# Patient Record
Sex: MALE | Race: WHITE | NOT HISPANIC OR LATINO | Employment: OTHER | URBAN - NONMETROPOLITAN AREA
[De-identification: names, ages, dates, MRNs, and addresses within clinical notes are randomized per-mention and may not be internally consistent; named-entity substitution may affect disease eponyms.]

---

## 2017-01-02 ENCOUNTER — HOSPITAL ENCOUNTER (OUTPATIENT)
Dept: OTHER | Facility: HOSPITAL | Age: 77
Discharge: HOME | End: 2017-01-02
Attending: INTERNAL MEDICINE | Admitting: INTERNAL MEDICINE

## 2017-01-03 ENCOUNTER — CONSULT (OUTPATIENT)
Dept: SURGERY | Facility: CLINIC | Age: 77
End: 2017-01-03

## 2017-01-03 VITALS
WEIGHT: 198 LBS | DIASTOLIC BLOOD PRESSURE: 74 MMHG | HEIGHT: 66 IN | BODY MASS INDEX: 31.82 KG/M2 | SYSTOLIC BLOOD PRESSURE: 134 MMHG

## 2017-01-03 DIAGNOSIS — C83.35 DIFFUSE LARGE B-CELL LYMPHOMA OF LYMPH NODES OF INGUINAL REGION (HCC): Primary | ICD-10-CM

## 2017-01-03 PROCEDURE — 99204 OFFICE O/P NEW MOD 45 MIN: CPT | Performed by: SURGERY

## 2017-01-03 RX ORDER — LISINOPRIL 10 MG/1
20 TABLET ORAL DAILY
Refills: 2 | COMMUNITY
Start: 2016-12-13 | End: 2019-07-09 | Stop reason: ALTCHOICE

## 2017-01-03 RX ORDER — ASPIRIN 325 MG
325 TABLET ORAL AS NEEDED
COMMUNITY
End: 2017-02-20 | Stop reason: HOSPADM

## 2017-01-03 RX ORDER — ATENOLOL 50 MG/1
50 TABLET ORAL DAILY
Refills: 3 | COMMUNITY
Start: 2016-12-13

## 2017-01-03 RX ORDER — HYDROCODONE BITARTRATE AND ACETAMINOPHEN 7.5; 325 MG/1; MG/1
1 TABLET ORAL EVERY 6 HOURS PRN
COMMUNITY
Start: 2016-12-07 | End: 2017-12-08

## 2017-01-03 NOTE — PROGRESS NOTES
Subjective   Sharan Steiner is a 76 y.o. male.   Referral from Dr. Ahumada.  Chief complaint port placement.  History of Present Illness   Mr. 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  He has had his orchiectomy and had a workup and he was deemed appropriate for chemotherapy.  He had a previous cancer on his left eyelid.  He is diagnosed with lymphoma month and half ago.  He currently has no symptoms.    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  He also has high blood pressure.    Surgical history of back surgery, sinus polyp surgery, left eyelid surgery, left orchiectomy.      Current Outpatient Prescriptions:   •  aspirin 325 MG tablet, Take 325 mg by mouth Daily As Needed for mild pain (1-3)., Disp: , Rfl:   •  aspirin 81 MG chewable tablet, Chew 81 mg daily., Disp: , Rfl:   •  atenolol (TENORMIN) 50 MG tablet, Take 1 tablet by mouth Daily., Disp: , Rfl: 3  •  doxazosin (CARDURA) 4 MG tablet, Take 4 mg by mouth every night., Disp: , Rfl:   •  fluticasone (FLONASE) 50 MCG/ACT nasal spray, 2 sprays into each nostril daily. Administer 2 sprays in each nostril for each dose., Disp: , Rfl:   •  HYDROcodone-acetaminophen (NORCO) 7.5-325 MG per tablet, Take 1 tablet by mouth Every 6 (Six) Hours As Needed., Disp: , Rfl:   •  lisinopril (PRINIVIL,ZESTRIL) 10 MG tablet, Take 1 tablet by mouth Daily., Disp: , Rfl: 2    He is allergic to tramadol, Levaquin, sulfa, penicillin, Crestor.    Family history of prostate cancer and lung cancer as well as diabetes and high blood pressure.    Social history patient is retired  and is .  He has one daughter.  The he used to smoke but quit tobacco 10 years ago.  The following portions of the patient's history were reviewed and updated as appropriate: allergies, current medications, past family  history, past medical history, past social history, past surgical history and problem list.    Review of Systems   Constitutional: Negative.    HENT: Negative.    Eyes: Negative.    Respiratory: Negative.    Cardiovascular: Negative.    Gastrointestinal: Negative.    Endocrine: Negative.    Genitourinary: Negative.    Musculoskeletal: Positive for arthralgias.   Skin: Negative.    Allergic/Immunologic: Negative.    Neurological: Negative.    Hematological: Negative.    Psychiatric/Behavioral: Negative.        Objective   Physical Exam   Constitutional: He is oriented to person, place, and time. He appears well-developed.   HENT:   Head: Normocephalic and atraumatic.   Eyes: Pupils are equal, round, and reactive to light.   Neck: Normal range of motion. Neck supple.   Cardiovascular: Normal rate, regular rhythm, normal heart sounds and intact distal pulses.    Pulmonary/Chest: Effort normal and breath sounds normal.   Abdominal: Soft. Bowel sounds are normal.   Musculoskeletal: Normal range of motion.   Neurological: He is alert and oriented to person, place, and time.   Skin: Skin is warm and dry.   Psychiatric: He has a normal mood and affect. His behavior is normal.       Assessment/Plan   Sharan was seen today for pre-op exam.    Diagnoses and all orders for this visit:    Diffuse large B-cell lymphoma of lymph nodes of inguinal region     Mr. Steiner is a 76-year-old gentleman with newly diagnosed lymphoma.  He is here for discussion for port placement.  The procedure and risks benefits alternatives and him and his family understand and agree with plan.  They would like to have his port placed this Thursday, January 5.  Thank you for the consult.  Due to his severe vertigo will not be allowed to hold his aspirin preoperatively.

## 2017-01-03 NOTE — LETTER
January 3, 2017     Conrado Ahumada MD  [address] [place] [address]   [place] [address]    Patient: Sharan Steiner   YOB: 1940   Date of Visit: 1/3/2017       Dear Dr. Zita MD:    Thank you for referring Sharan Steiner to me for evaluation. Below are the relevant portions of my assessment and plan of care.      Sharan was seen today for pre-op exam.    Diagnoses and all orders for this visit:    Diffuse large B-cell lymphoma of lymph nodes of inguinal region     Mr. Steiner is a 76-year-old gentleman with newly diagnosed lymphoma.  He is here for discussion for port placement.  The procedure and risks benefits alternatives and him and his family understand and agree with plan.  They would like to have his port placed this Thursday, January 5.  Thank you for the consult.  Due to his severe vertigo will not be allowed to hold his aspirin preoperatively.                      If you have questions, please do not hesitate to call me. I look forward to following Sharan along with you.         Sincerely,        Wilfrido Ramsey MD        CC: No Recipients

## 2017-01-05 ENCOUNTER — HOSPITAL ENCOUNTER (OUTPATIENT)
Dept: OTHER | Facility: HOSPITAL | Age: 77
Setting detail: HOSPITAL OUTPATIENT SURGERY
Discharge: PSYCHIATRIC OR SUBSTANCE USE FACILITY | End: 2017-01-05
Attending: SURGERY | Admitting: SURGERY

## 2017-01-06 LAB
ALBUMIN SERPL-MCNC: 3.7 GM/DL (ref 3.4–4.8)
ALP SERPL-CCNC: 84 U/L (ref 38–126)
ALT SERPL-CCNC: 33 U/L (ref 21–72)
ANION GAP SERPL CALCULATED.3IONS-SCNC: 7 MMOL/L (ref 5–15)
APTT PPP: 29.2 SECONDS (ref 20–40.3)
AST SERPL-CCNC: 26 U/L (ref 17–59)
BASOPHILS # BLD AUTO: 0.05 X1000/UL (ref 0–0.2)
BASOPHILS NFR BLD AUTO: 0.6 % (ref 0–2)
BILIRUB SERPL-MCNC: 0.5 MG/DL (ref 0.2–1.3)
BUN SERPL-MCNC: 13 MG/DL (ref 7–21)
CALCIUM SERPL-MCNC: 9 MG/DL (ref 8.4–10.2)
CHLORIDE SERPL-SCNC: 102 MMOL/L (ref 95–110)
CO2 SERPL-SCNC: 30 MMOL/L (ref 22–31)
CONV AUTO IG#: 0.03 X1000/UL (ref 0.01–0.02)
CONV AUTO IG%: 0.3 % (ref 0–0.5)
CREAT SERPL-MCNC: 1 MG/DL (ref 0.7–1.3)
EOSINOPHIL # BLD AUTO: 0.42 X1000/UL (ref 0–0.7)
EOSINOPHIL NFR BLD AUTO: 4.7 % (ref 0–7)
ERYTHROCYTE [DISTWIDTH] IN BLOOD: 13.1 % (ref 11.5–14.5)
GLUCOSE SERPL-MCNC: 90 MG/DL (ref 60–100)
GRANULOCYTES # BLD AUTO: 6.5 X1000/UL (ref 2–8.6)
GRANULOCYTES NFR BLD AUTO: 72.4 % (ref 37–80)
HCT VFR BLD CALC: 38.5 % (ref 39–49)
HGB BLD-MCNC: 13.1 GM/DL (ref 13.7–17.3)
INR PPP: 1 (ref 0.8–1.2)
LYMPHOCYTES # BLD AUTO: 1.39 X1000/UL (ref 0.6–4.2)
LYMPHOCYTES NFR BLD AUTO: 15.5 % (ref 10–50)
MCH RBC QN: 28.4 PG (ref 26–34)
MCHC RBC-ENTMCNC: 34 GM/DL (ref 31.5–36.3)
MCV RBC: 83.5 FL (ref 80–98)
MONOCYTES # BLD AUTO: 0.58 X1000/UL (ref 0–0.9)
MONOCYTES NFR BLD AUTO: 6.5 % (ref 0–12)
NRBC BLD AUTO-RTO: 0 % (ref 0–0.2)
NRBC SPEC MANUAL: 0 X1000/UL
PLATELET # BLD: 219 X1000/MM3 (ref 150–450)
PMV BLD: 10.5 FL (ref 8–12)
POTASSIUM SERPL-SCNC: 4.5 MMOL/L (ref 3.5–5.1)
PROT SERPL-MCNC: 6.7 GM/DL (ref 6.3–8.6)
PROTHROMBIN TIME: 13.2 SECONDS (ref 11.1–15.3)
RBC # BLD: 4.61 MEGA/MM3 (ref 4.37–5.74)
SODIUM SERPL-SCNC: 139 MMOL/L (ref 137–145)
WBC # BLD: 9 X1000/UL (ref 3.2–9.8)

## 2017-01-10 ENCOUNTER — OFFICE VISIT (OUTPATIENT)
Dept: GASTROENTEROLOGY | Facility: CLINIC | Age: 77
End: 2017-01-10

## 2017-01-10 VITALS
DIASTOLIC BLOOD PRESSURE: 98 MMHG | RESPIRATION RATE: 18 BRPM | SYSTOLIC BLOOD PRESSURE: 166 MMHG | OXYGEN SATURATION: 95 % | WEIGHT: 197 LBS | HEART RATE: 54 BPM | HEIGHT: 66 IN | BODY MASS INDEX: 31.66 KG/M2

## 2017-01-10 DIAGNOSIS — R93.89 ABNORMAL FINDING ON RADIOLOGY EXAM: Primary | ICD-10-CM

## 2017-01-10 PROCEDURE — 99213 OFFICE O/P EST LOW 20 MIN: CPT | Performed by: INTERNAL MEDICINE

## 2017-01-10 RX ORDER — SODIUM, POTASSIUM,MAG SULFATES 17.5-3.13G
1 SOLUTION, RECONSTITUTED, ORAL ORAL EVERY 12 HOURS
Qty: 1 BOTTLE | Refills: 0 | Status: SHIPPED | OUTPATIENT
Start: 2017-01-10 | End: 2017-02-16

## 2017-01-10 NOTE — LETTER
January 10, 2017     Mary Ellen Zhou DO  [address] [facility] [address]    Patient: Sharan Steiner   YOB: 1940   Date of Visit: 1/10/2017       Dear Dr. Abelardo DO:    Thank you for referring Sharan Steiner to me for evaluation. Below are the relevant portions of my assessment and plan of care.      Sharan was seen today for colonoscopy.    Diagnoses and all orders for this visit:    Abnormal finding on radiology exam  -     sodium-potassium-magnesium sulfates (SUPREP) solution oral solution; Take 1 bottle by mouth Every 12 (Twelve) Hours.  -     Case request        COLONOSCOPY (N/A)     Diagnosis Plan   1. Abnormal finding on radiology exam  sodium-potassium-magnesium sulfates (SUPREP) solution oral solution    Case request       Anticipated Surgical Procedure:  No orders of the defined types were placed in this encounter.      The risks, benefits, and alternatives of this procedure have been discussed with the patient or the responsible party- the patient understands and agrees to proceed.                                                                              If you have questions, please do not hesitate to call me. I look forward to following Sharan along with you.         Sincerely,        Jeff Crabtree MD        CC: No Recipients

## 2017-01-10 NOTE — LETTER
January 10, 2017     Mary Ellen Zhou DO  [address] [facility] [address]    Patient: Sharan Steiner   YOB: 1940   Date of Visit: 1/10/2017       Dear Dr. Abelardo DO:    Thank you for referring Sharan Steiner to me for evaluation. Below is a copy of my consult note.    If you have questions, please do not hesitate to call me. I look forward to following Sharan along with you.         Sincerely,        Jeff Crabtree MD        CC: No Recipients    [facility]      Chief Complaint:   Chief Complaint   Patient presents with   • Colonoscopy     Consult per Zita       Subjective     HPI:   Patient with a recent diagnosis of lymphoma.  Patient was found to have an abnormal PET scan with questionable area of mass at the ileocecal valve.  Patient denies any difficulty with bowel movements or abdominal pain.  Patient had a normal colonoscopy in 2013 with normal-appearing ileocecal valve.    Plan; we'll schedule patient for colonoscopy to evaluate the area around the ileocecal valve.    Past Medical History:   Past Medical History   Diagnosis Date   • Benign neoplasm of skin of eyelid      probable pyogenic granuloma YAA   • Conjunctival cyst      CARUNCLE OS   • Cortical senile cataract    • Diabetes mellitus    • Hypertension    • Malignant neoplasm of upper eyelid      sebaceous gland CA, YAA margin, completely excised, no recurrence   • Nuclear cataract        Family History:  Family History   Problem Relation Age of Onset   • Diabetes Mother    • Hypertension Sister    • Cancer Sister    • Hypertension Brother    • Cancer Brother        Social History:   reports that he has quit smoking. He does not have any smokeless tobacco history on file. He reports that he does not drink alcohol.    Medications:     (Not in a hospital admission)    Allergies:  Crestor [rosuvastatin calcium]; Levofloxacin; Penicillins; Sulfa antibiotics; and Tramadol    ROS:    Review of Systems   HENT:  "Negative for congestion, sore throat and trouble swallowing.    Respiratory: Negative for apnea, cough, choking, chest tightness, shortness of breath, wheezing and stridor.    Cardiovascular: Negative for chest pain, palpitations and leg swelling.   Gastrointestinal: Negative for abdominal distention, abdominal pain, anal bleeding, blood in stool, constipation, diarrhea, nausea, rectal pain and vomiting.   Skin: Negative for color change, pallor, rash and wound.   Neurological: Negative for dizziness, syncope, weakness and headaches.   Psychiatric/Behavioral: Negative for agitation, behavioral problems, confusion and decreased concentration.     Objective     Blood pressure 166/98, pulse 54, resp. rate 18, height 66\" (167.6 cm), weight 197 lb (89.4 kg), SpO2 95 %.    Physical Exam   Constitutional: He is oriented to person, place, and time. He appears well-developed and well-nourished. No distress.   HENT:   Head: Normocephalic and atraumatic.   Cardiovascular: Normal rate, regular rhythm, normal heart sounds and intact distal pulses.  Exam reveals no gallop and no friction rub.    No murmur heard.  Pulmonary/Chest: Breath sounds normal. No respiratory distress. He has no wheezes. He has no rales. He exhibits no tenderness.   Abdominal: Soft. Bowel sounds are normal. He exhibits no distension and no mass. There is no tenderness. There is no rebound and no guarding. No hernia.   Musculoskeletal: Normal range of motion. He exhibits no edema.   Neurological: He is alert and oriented to person, place, and time.   Skin: Skin is warm and dry. No rash noted. He is not diaphoretic. No erythema. No pallor.   Psychiatric: He has a normal mood and affect. His behavior is normal. Judgment and thought content normal.        Assessment/Plan   Sharan was seen today for colonoscopy.    Diagnoses and all orders for this visit:    Abnormal finding on radiology exam  -     sodium-potassium-magnesium sulfates (SUPREP) solution oral " solution; Take 1 bottle by mouth Every 12 (Twelve) Hours.  -     Case request        COLONOSCOPY (N/A)     Diagnosis Plan   1. Abnormal finding on radiology exam  sodium-potassium-magnesium sulfates (SUPREP) solution oral solution    Case request       Anticipated Surgical Procedure:  No orders of the defined types were placed in this encounter.      The risks, benefits, and alternatives of this procedure have been discussed with the patient or the responsible party- the patient understands and agrees to proceed.

## 2017-01-10 NOTE — PROGRESS NOTES
[facility]      Chief Complaint:   Chief Complaint   Patient presents with   • Colonoscopy     Consult per Zita       Subjective     HPI:   Patient with a recent diagnosis of lymphoma.  Patient was found to have an abnormal PET scan with questionable area of mass at the ileocecal valve.  Patient denies any difficulty with bowel movements or abdominal pain.  Patient had a normal colonoscopy in 2013 with normal-appearing ileocecal valve.    Plan; we'll schedule patient for colonoscopy to evaluate the area around the ileocecal valve.    Past Medical History:   Past Medical History   Diagnosis Date   • Benign neoplasm of skin of eyelid      probable pyogenic granuloma YAA   • Conjunctival cyst      CARUNCLE OS   • Cortical senile cataract    • Diabetes mellitus    • Hypertension    • Malignant neoplasm of upper eyelid      sebaceous gland CA, YAA margin, completely excised, no recurrence   • Nuclear cataract        Family History:  Family History   Problem Relation Age of Onset   • Diabetes Mother    • Hypertension Sister    • Cancer Sister    • Hypertension Brother    • Cancer Brother        Social History:   reports that he has quit smoking. He does not have any smokeless tobacco history on file. He reports that he does not drink alcohol.    Medications:     (Not in a hospital admission)    Allergies:  Crestor [rosuvastatin calcium]; Levofloxacin; Penicillins; Sulfa antibiotics; and Tramadol    ROS:    Review of Systems   HENT: Negative for congestion, sore throat and trouble swallowing.    Respiratory: Negative for apnea, cough, choking, chest tightness, shortness of breath, wheezing and stridor.    Cardiovascular: Negative for chest pain, palpitations and leg swelling.   Gastrointestinal: Negative for abdominal distention, abdominal pain, anal bleeding, blood in stool, constipation, diarrhea, nausea, rectal pain and vomiting.   Skin: Negative for color change, pallor, rash and wound.  "  Neurological: Negative for dizziness, syncope, weakness and headaches.   Psychiatric/Behavioral: Negative for agitation, behavioral problems, confusion and decreased concentration.     Objective     Blood pressure 166/98, pulse 54, resp. rate 18, height 66\" (167.6 cm), weight 197 lb (89.4 kg), SpO2 95 %.    Physical Exam   Constitutional: He is oriented to person, place, and time. He appears well-developed and well-nourished. No distress.   HENT:   Head: Normocephalic and atraumatic.   Cardiovascular: Normal rate, regular rhythm, normal heart sounds and intact distal pulses.  Exam reveals no gallop and no friction rub.    No murmur heard.  Pulmonary/Chest: Breath sounds normal. No respiratory distress. He has no wheezes. He has no rales. He exhibits no tenderness.   Abdominal: Soft. Bowel sounds are normal. He exhibits no distension and no mass. There is no tenderness. There is no rebound and no guarding. No hernia.   Musculoskeletal: Normal range of motion. He exhibits no edema.   Neurological: He is alert and oriented to person, place, and time.   Skin: Skin is warm and dry. No rash noted. He is not diaphoretic. No erythema. No pallor.   Psychiatric: He has a normal mood and affect. His behavior is normal. Judgment and thought content normal.        Assessment/Plan   Sharan was seen today for colonoscopy.    Diagnoses and all orders for this visit:    Abnormal finding on radiology exam  -     sodium-potassium-magnesium sulfates (SUPREP) solution oral solution; Take 1 bottle by mouth Every 12 (Twelve) Hours.  -     Case request        COLONOSCOPY (N/A)     Diagnosis Plan   1. Abnormal finding on radiology exam  sodium-potassium-magnesium sulfates (SUPREP) solution oral solution    Case request       Anticipated Surgical Procedure:  No orders of the defined types were placed in this encounter.      The risks, benefits, and alternatives of this procedure have been discussed with the patient or the responsible " party- the patient understands and agrees to proceed.

## 2017-01-13 ENCOUNTER — HOSPITAL ENCOUNTER (OUTPATIENT)
Dept: GASTROENTEROLOGY | Facility: HOSPITAL | Age: 77
Setting detail: HOSPITAL OUTPATIENT SURGERY
Discharge: HOME | End: 2017-01-13
Attending: INTERNAL MEDICINE | Admitting: INTERNAL MEDICINE

## 2017-01-17 ENCOUNTER — HOSPITAL ENCOUNTER (OUTPATIENT)
Dept: OTHER | Facility: HOSPITAL | Age: 77
Setting detail: RADIATION/ONCOLOGY SERIES
Discharge: HOME | End: 2017-01-20
Attending: INTERNAL MEDICINE | Admitting: INTERNAL MEDICINE

## 2017-01-17 LAB
ALBUMIN SERPL-MCNC: 3.7 GM/DL (ref 3.4–4.8)
ALP SERPL-CCNC: 88 U/L (ref 38–126)
ALT SERPL-CCNC: 31 U/L (ref 21–72)
ANION GAP SERPL CALCULATED.3IONS-SCNC: 10 MMOL/L (ref 5–15)
AST SERPL-CCNC: 30 U/L (ref 17–59)
BASOPHILS # BLD AUTO: 0.08 X1000/UL (ref 0–0.2)
BASOPHILS NFR BLD AUTO: 1.1 % (ref 0–2)
BILIRUB SERPL-MCNC: 0.5 MG/DL (ref 0.2–1.3)
BUN SERPL-MCNC: 13 MG/DL (ref 7–21)
CALCIUM SERPL-MCNC: 8.8 MG/DL (ref 8.4–10.2)
CHLORIDE SERPL-SCNC: 102 MMOL/L (ref 95–110)
CO2 SERPL-SCNC: 27 MMOL/L (ref 22–31)
CONV AUTO IG#: 0.02 X1000/UL (ref 0.01–0.02)
CONV AUTO IG%: 0.3 % (ref 0–0.5)
CONVERTED (HISTORICAL) SPECIMEN DESCRIPTION 1: NORMAL
CONVERTED (HISTORICAL) SPECIMEN DESCRIPTION 2: NORMAL
CREAT SERPL-MCNC: 0.9 MG/DL (ref 0.7–1.3)
EOSINOPHIL # BLD AUTO: 0.33 X1000/UL (ref 0–0.7)
EOSINOPHIL NFR BLD AUTO: 4.7 % (ref 0–7)
ERYTHROCYTE [DISTWIDTH] IN BLOOD: 13.4 % (ref 11.5–14.5)
GLUCOSE SERPL-MCNC: 92 MG/DL (ref 60–100)
GRANULOCYTES # BLD AUTO: 4.75 X1000/UL (ref 2–8.6)
GRANULOCYTES NFR BLD AUTO: 67.9 % (ref 37–80)
HCT VFR BLD CALC: 38.2 % (ref 39–49)
HGB BLD-MCNC: 12.9 GM/DL (ref 13.7–17.3)
LYMPHOCYTES # BLD AUTO: 1.28 X1000/UL (ref 0.6–4.2)
LYMPHOCYTES NFR BLD AUTO: 18.3 % (ref 10–50)
MCH RBC QN: 27.9 PG (ref 26–34)
MCHC RBC-ENTMCNC: 33.8 GM/DL (ref 31.5–36.3)
MCV RBC: 82.7 FL (ref 80–98)
MONOCYTES # BLD AUTO: 0.54 X1000/UL (ref 0–0.9)
MONOCYTES NFR BLD AUTO: 7.7 % (ref 0–12)
NRBC BLD AUTO-RTO: 0 % (ref 0–0.2)
NRBC SPEC MANUAL: 0 X1000/UL
PLATELET # BLD: 265 X1000/MM3 (ref 150–450)
PMV BLD: 10.3 FL (ref 8–12)
POTASSIUM SERPL-SCNC: 4.4 MMOL/L (ref 3.5–5.1)
PROT SERPL-MCNC: 6.7 GM/DL (ref 6.3–8.6)
RBC # BLD: 4.62 MEGA/MM3 (ref 4.37–5.74)
SODIUM SERPL-SCNC: 139 MMOL/L (ref 137–145)
WBC # BLD: 7 X1000/UL (ref 3.2–9.8)

## 2017-01-17 NOTE — OP NOTE
[facility]                               REPORT OF OPERATION     NAME:  LEE STANFORD   UNIT #:  [id number]  :  1940              ACCT #:  [id number]  ROOM:  [id number]                 SURGEON:  RANDAL VARGAS MD  DICTATED:  2017   TRANSCRIBED:  2017 09        DATE OF PROCEDURE:  2017     PREOPERATIVE DIAGNOSIS:  Lymphoma.     POSTOPERATIVE DIAGNOSIS:  Lymphoma.     PROCEDURE PERFORMED:  Right internal jugular MediPort placement with  ultrasound guidance.     ASSISTANT:  Taylor Chavez CSA     COMPLICATIONS:  None.     SPECIMENS:  None.     IMPLANT:  An 8-Uzbek MediPort.     FINDINGS:  A right IJ vein access with 1 stick. Wires seen passing via  ultrasound and fluoroscopic guidance, and then fluoroscopy confirmed  catheter tip placement.     ANESTHESIA:  MAC and local.     DESCRIPTION OF PROCEDURE:  After consent was obtained patient was taken  to the operating room, where MAC anesthesia was induced. Perioperative  antibiotics were given. The neck and bilateral chest were prepped and  draped in normal sterile fashion. SCDs were in placed and perioperative  antibiotics had been given. We prepped and draped and time-out was  performed.     Then under ultrasound guidance, the right internal jugular vein was  visualized and accessed via a needle, and using standard Seldinger  technique, a wire was passed, and then a dilator was passed and  fluoroscopy was used to confirm that the wire was in place as well as  ultrasound. Once we had our sheath in, we then made our pocket in the  right upper chest just under the clavicle and tunneled the catheter from  the pocket to the stick site. The catheter was threaded through the  sheath and the sheath was broke and removed. The catheter flushed well.  The catheter was then pulled to the correct length under fluoroscopic  guidance just above the heart in the superior vena cava. At this time  the catheter was cut to  length and the caps placed on and the port was  attached to the catheter with the coupling device. The port was then  sewn to the chest wall and once again the catheter was checked. It  flushed and billy well. It was flushed with heparinized saline with a  Dueñas needle. Final chest x-ray was done which showed good catheter  position and no kinks in the catheter.     At this time the skin on the port site was closed with interrupted deep  dermal 3-0 Vicryl's, and both the stick and the port site were closed  with 4-0 Vicryl's and covered with Skin Affix. The procedure was  terminated. Patient tolerated the procedure well.        RANDAL VARGAS MD  Electronically Signed  01/17/2017 11:40:33  By MD ALYSIA PAULINO/matthew  [id number]                            REPORT OF OPERATION  Page #page

## 2017-01-19 DIAGNOSIS — C83.30 DLBCL (DIFFUSE LARGE B CELL LYMPHOMA) (HCC): ICD-10-CM

## 2017-02-02 ENCOUNTER — APPOINTMENT (OUTPATIENT)
Dept: CT IMAGING | Facility: HOSPITAL | Age: 77
End: 2017-02-02

## 2017-02-02 ENCOUNTER — HOSPITAL ENCOUNTER (EMERGENCY)
Facility: HOSPITAL | Age: 77
Discharge: HOME | End: 2017-02-02
Attending: EMERGENCY MEDICINE | Admitting: EMERGENCY MEDICINE

## 2017-02-02 VITALS
WEIGHT: 196 LBS | OXYGEN SATURATION: 91 % | TEMPERATURE: 97.1 F | DIASTOLIC BLOOD PRESSURE: 81 MMHG | HEIGHT: 66 IN | BODY MASS INDEX: 31.5 KG/M2 | SYSTOLIC BLOOD PRESSURE: 186 MMHG | RESPIRATION RATE: 18 BRPM | HEART RATE: 60 BPM

## 2017-02-02 DIAGNOSIS — R56.9 SEIZURE (HCC): Primary | ICD-10-CM

## 2017-02-02 LAB
ALBUMIN SERPL-MCNC: 3.7 G/DL (ref 3.4–4.8)
ALBUMIN/GLOB SERPL: 1.3 G/DL (ref 1.1–1.8)
ALP SERPL-CCNC: 109 U/L (ref 38–126)
ALT SERPL W P-5'-P-CCNC: 25 U/L (ref 21–72)
ANION GAP SERPL CALCULATED.3IONS-SCNC: 10 MMOL/L (ref 5–15)
AST SERPL-CCNC: 30 U/L (ref 17–59)
BASOPHILS # BLD AUTO: 0.03 10*3/MM3 (ref 0–0.2)
BASOPHILS NFR BLD AUTO: 0.3 % (ref 0–2)
BILIRUB SERPL-MCNC: 0.3 MG/DL (ref 0.2–1.3)
BUN BLD-MCNC: 10 MG/DL (ref 7–21)
BUN/CREAT SERPL: 12.8 (ref 7–25)
CALCIUM SPEC-SCNC: 9 MG/DL (ref 8.4–10.2)
CHLORIDE SERPL-SCNC: 100 MMOL/L (ref 95–110)
CO2 SERPL-SCNC: 25 MMOL/L (ref 22–31)
CREAT BLD-MCNC: 0.78 MG/DL (ref 0.7–1.3)
DEPRECATED RDW RBC AUTO: 39.7 FL (ref 35.1–43.9)
EOSINOPHIL # BLD AUTO: 0 10*3/MM3 (ref 0–0.7)
EOSINOPHIL NFR BLD AUTO: 0 % (ref 0–7)
ERYTHROCYTE [DISTWIDTH] IN BLOOD BY AUTOMATED COUNT: 13.4 % (ref 11.5–14.5)
GFR SERPL CREATININE-BSD FRML MDRD: 97 ML/MIN/1.73 (ref 42–98)
GLOBULIN UR ELPH-MCNC: 2.9 GM/DL (ref 2.3–3.5)
GLUCOSE BLD-MCNC: 103 MG/DL (ref 60–100)
GLUCOSE BLDC GLUCOMTR-MCNC: 117 MG/DL (ref 70–130)
GLUCOSE BLDC GLUCOMTR-MCNC: 117 MG/DL (ref 70–130)
HCT VFR BLD AUTO: 37 % (ref 39–49)
HGB BLD-MCNC: 12.6 G/DL (ref 13.7–17.3)
HOLD SPECIMEN: NORMAL
HOLD SPECIMEN: NORMAL
IMM GRANULOCYTES # BLD: 0.54 10*3/MM3 (ref 0–0.02)
IMM GRANULOCYTES NFR BLD: 4.7 % (ref 0–0.5)
LYMPHOCYTES # BLD AUTO: 0.64 10*3/MM3 (ref 0.6–4.2)
LYMPHOCYTES NFR BLD AUTO: 5.6 % (ref 10–50)
MCH RBC QN AUTO: 27.9 PG (ref 26.5–34)
MCHC RBC AUTO-ENTMCNC: 34.1 G/DL (ref 31.5–36.3)
MCV RBC AUTO: 81.9 FL (ref 80–98)
MONOCYTES # BLD AUTO: 0.06 10*3/MM3 (ref 0–0.9)
MONOCYTES NFR BLD AUTO: 0.5 % (ref 0–12)
NEUTROPHILS # BLD AUTO: 10.15 10*3/MM3 (ref 2–8.6)
NEUTROPHILS NFR BLD AUTO: 88.9 % (ref 37–80)
PLATELET # BLD AUTO: 331 10*3/MM3 (ref 150–450)
PMV BLD AUTO: 9.2 FL (ref 8–12)
POTASSIUM BLD-SCNC: 4.1 MMOL/L (ref 3.5–5.1)
PROT SERPL-MCNC: 6.6 G/DL (ref 6.3–8.6)
RBC # BLD AUTO: 4.52 10*6/MM3 (ref 4.37–5.74)
SODIUM BLD-SCNC: 135 MMOL/L (ref 137–145)
WBC NRBC COR # BLD: 11.42 10*3/MM3 (ref 3.2–9.8)
WHOLE BLOOD HOLD SPECIMEN: NORMAL
WHOLE BLOOD HOLD SPECIMEN: NORMAL

## 2017-02-02 PROCEDURE — 70450 CT HEAD/BRAIN W/O DYE: CPT

## 2017-02-02 PROCEDURE — 85025 COMPLETE CBC W/AUTO DIFF WBC: CPT | Performed by: EMERGENCY MEDICINE

## 2017-02-02 PROCEDURE — 82962 GLUCOSE BLOOD TEST: CPT

## 2017-02-02 PROCEDURE — 99285 EMERGENCY DEPT VISIT HI MDM: CPT

## 2017-02-02 PROCEDURE — 80053 COMPREHEN METABOLIC PANEL: CPT | Performed by: EMERGENCY MEDICINE

## 2017-02-02 RX ORDER — LEVETIRACETAM 500 MG/1
500 TABLET ORAL DAILY
Status: DISCONTINUED | OUTPATIENT
Start: 2017-02-02 | End: 2017-02-02 | Stop reason: HOSPADM

## 2017-02-02 RX ORDER — SODIUM CHLORIDE 0.9 % (FLUSH) 0.9 %
10 SYRINGE (ML) INJECTION AS NEEDED
Status: DISCONTINUED | OUTPATIENT
Start: 2017-02-02 | End: 2017-02-02 | Stop reason: HOSPADM

## 2017-02-02 RX ORDER — LEVETIRACETAM 500 MG/1
500 TABLET ORAL 2 TIMES DAILY
Qty: 28 TABLET | Refills: 0 | Status: SHIPPED | OUTPATIENT
Start: 2017-02-02 | End: 2021-10-06

## 2017-02-02 RX ADMIN — LEVETIRACETAM 500 MG: 500 TABLET, FILM COATED ORAL at 14:09

## 2017-02-02 NOTE — ED PROVIDER NOTES
Subjective   HPI Comments: Arrived by EMS    Patient is a 76 y.o. male presenting with seizures.   Seizures   Seizure activity on arrival: no    Seizure type:  Grand mal  Preceding symptoms comment:  None  Initial focality:  Unable to specify  Episode characteristics: abnormal movements    Postictal symptoms: confusion    Return to baseline: yes    Severity:  Severe  Duration: a minute or 2.  Timing:  Once  Progression:  Resolved  Context: not change in medication, not sleeping less, not emotional upset, not family hx of seizures, not fever and medical compliance    Recent head injury:  No recent head injuries  PTA treatment:  None  History of seizures: yes (once)        Review of Systems   Constitutional: Negative for activity change, appetite change, chills and fever.   HENT: Negative for congestion, ear pain and sore throat.    Eyes: Negative.  Negative for discharge and redness.   Respiratory: Negative.  Negative for cough, chest tightness and shortness of breath.    Cardiovascular: Negative.  Negative for chest pain, palpitations and leg swelling.   Gastrointestinal: Negative.  Negative for abdominal pain, diarrhea, nausea and vomiting.   Genitourinary: Negative for difficulty urinating, dysuria, flank pain and urgency.   Musculoskeletal: Negative.  Negative for arthralgias, back pain, joint swelling, myalgias and neck pain.   Skin: Negative.  Negative for color change and rash.   Neurological: Positive for seizures. Negative for dizziness, speech difficulty, weakness, numbness and headaches.   Psychiatric/Behavioral: Negative for behavioral problems.   All other systems reviewed and are negative.      Past Medical History   Diagnosis Date   • Benign neoplasm of skin of eyelid      probable pyogenic granuloma YAA   • Conjunctival cyst      CARUNCLE OS   • Cortical senile cataract    • Diabetes mellitus    • Hypertension    • Lymphoma of testis 11/2016   • Malignant neoplasm of upper eyelid      sebaceous gland  CA, YAA margin, completely excised, no recurrence   • Nuclear cataract    • Seizures 11/2016     1ST SEIZURE, 2ND SEIZURE TODAY       Allergies   Allergen Reactions   • Crestor [Rosuvastatin Calcium]    • Levofloxacin    • Penicillins    • Sulfa Antibiotics    • Tramadol        Past Surgical History   Procedure Laterality Date   • Excision lesion  08/13/2013     EYELID EXCISIONAL BIOPSY 51318 (Neoplasm, skin eyelid, benign)    • Back surgery     • Orchiectomy         Family History   Problem Relation Age of Onset   • Diabetes Mother    • Hypertension Sister    • Cancer Sister    • Hypertension Brother    • Cancer Brother        Social History     Social History   • Marital status:      Spouse name: N/A   • Number of children: N/A   • Years of education: N/A     Social History Main Topics   • Smoking status: Former Smoker   • Smokeless tobacco: None   • Alcohol use No   • Drug use: None   • Sexual activity: Not Asked     Other Topics Concern   • None     Social History Narrative   • None           Objective   Physical Exam   Constitutional: He is oriented to person, place, and time. He appears well-developed and well-nourished. No distress.   HENT:   Head: Normocephalic and atraumatic.   Right Ear: External ear normal.   Left Ear: External ear normal.   Nose: Nose normal.   Mouth/Throat: Oropharynx is clear and moist.   Eyes: Conjunctivae and EOM are normal. Pupils are equal, round, and reactive to light.   Neck: Normal range of motion. Neck supple.   Cardiovascular: Normal rate, regular rhythm, normal heart sounds and intact distal pulses.    Pulmonary/Chest: Effort normal and breath sounds normal.   Abdominal: Soft. Bowel sounds are normal.   Musculoskeletal: Normal range of motion.   Neurological: He is alert and oriented to person, place, and time. He has normal strength and normal reflexes. No cranial nerve deficit or sensory deficit. He displays a negative Romberg sign. GCS eye subscore is 4. GCS verbal  subscore is 5. GCS motor subscore is 6.   Skin: Skin is warm and dry.   Psychiatric: He has a normal mood and affect. His behavior is normal.   Nursing note and vitals reviewed.      Procedures         ED Course  ED Course   Comment By Time   Discussed with Dr. Ahumada.   Patient to be started on Keppra and is given a referral to neuro. He will follow up with Dr. Ahumada. He had a second seizure and this will need to be treated Domingo Acosta MD 02/02 1404      Labs Reviewed   COMPREHENSIVE METABOLIC PANEL - Abnormal; Notable for the following:        Result Value    Glucose 103 (*)     Sodium 135 (*)     All other components within normal limits    Narrative:     The MDRD GFR formula is only valid for adults with stable renal function between ages 18 and 70.   CBC WITH AUTO DIFFERENTIAL - Abnormal; Notable for the following:     WBC 11.42 (*)     Hemoglobin 12.6 (*)     Hematocrit 37.0 (*)     Neutrophil % 88.9 (*)     Lymphocyte % 5.6 (*)     Immature Grans % 4.7 (*)     Neutrophils, Absolute 10.15 (*)     Immature Grans, Absolute 0.54 (*)     All other components within normal limits   POCT GLUCOSE FINGERSTICK - Normal   POCT GLUCOSE FINGERSTICK - Normal   RAINBOW DRAW    Narrative:     The following orders were created for panel order [facility] Draw.  Procedure                               Abnormality         Status                     ---------                               -----------         ------                     Light Blue Top[[id number]]                                                               Green Top (Gel)[[id number]]                                   Final result               Lavender Top[[id number]]                                      Final result               Gold Top - SST[[id number]]                                                                 Please view results for these tests on the individual orders.   RAINBOW DRAW    Narrative:     The following orders were created for panel order [facility]  Draw.  Procedure                               Abnormality         Status                     ---------                               -----------         ------                     Light Blue Top[[id number]]                                    Final result               Green Top (Gel)[[id number]]                                                              Lavender Top[[id number]]                                                                 Gold Top - SST[[id number]]                                    Final result                 Please view results for these tests on the individual orders.   CBC AND DIFFERENTIAL    Narrative:     The following orders were created for panel order CBC & Differential.  Procedure                               Abnormality         Status                     ---------                               -----------         ------                     CBC Auto Differential[[id number]]         Abnormal            Final result                 Please view results for these tests on the individual orders.   GREEN TOP   LAVENDER TOP   LIGHT BLUE TOP   GOLD TOP - SST     Ct Head Without Contrast    Result Date: 2/2/2017  Narrative: PROCEDURE: CT head without contrast REASON FOR EXAM: headache then seizure FINDINGS: Comparison study dated November 10, 2016. Axial computer tomography sequential imaging of the head was performed from the vertex to the base of the skull. .Sagittal and coronal reformation was performed . The skull vault is intact. Partial opacification of bilateral maxillary sinuses bilateral ethmoid sinuses and right frontal sinus. Otherwise paranasal sinuses and bilateral mastoid air cells are well aerated. Cerebral and cerebellar parenchymal are normal. Ventricular system and subarachnoid spaces are normal.     Impression: 1.  No acute intracranial abnormality. 2.  Pansinusitis. Electronically signed by:  Fred Dumont  2/2/2017 10:13 AM CST     Xr Chest 1 View    Result Date: 1/5/2017  Narrative:  Patient Name-  LEE STANFORD Patient ID-  [id number] Ordering-  RANDAL VARGAS MD Attending-  RANDAL VARGAS MD Referring-  RANDAL VARGAS MD ------------------------------------------------  PROCEDURE- Single chest view AP  REASON FOR EXAM-POST MEDIPORT PLACEMENT IN  POST MEDIPORT PLACEMENT IN   FINDINGS- Comparison study dated November 10, 2016. Interval placement of right jugular Mediport placement with tip overlying the SVC. Cardiac and pulmonary vasculature are normal. Lungs are clear. Pleural spaces are normal. No acute osseous abnormality.  IMPRESSION- 1.  Interval placement of right jugular Mediport with tip overlying the SVC. No pneumothorax. 2.  Otherwise unremarkable chest.  Electronically Signed By- Fred Dumont MD On: 2017-01-05 08:44:09              [facility]    Final diagnoses:   Seizure            Domingo Maik Acosta MD  02/03/17 0601

## 2017-02-02 NOTE — ED NOTES
Sharan Steiner brought in by EMS for seizure activity reported by his wife this morning around 0730 am. According to Mr. Steiner's wife this is his second seizure. His first seizure was in November of 2016 (before he started chemo) and doctors thought it was r/t to taking Tramadol and Leviquin, however Mr. Steiner is not taking those medications currently. The Patient was diagnosed with Lymphoma in 11/2016 and has underwent one round of chemo under Dr. Ahumada at the [facility].     Rosina Humphrey RN  02/02/17 1014

## 2017-02-07 ENCOUNTER — INFUSION (OUTPATIENT)
Dept: ONCOLOGY | Facility: HOSPITAL | Age: 77
End: 2017-02-07

## 2017-02-07 ENCOUNTER — OFFICE VISIT (OUTPATIENT)
Dept: ONCOLOGY | Facility: CLINIC | Age: 77
End: 2017-02-07

## 2017-02-07 VITALS
HEIGHT: 66 IN | SYSTOLIC BLOOD PRESSURE: 145 MMHG | WEIGHT: 191.1 LBS | HEART RATE: 54 BPM | BODY MASS INDEX: 30.71 KG/M2 | DIASTOLIC BLOOD PRESSURE: 75 MMHG | RESPIRATION RATE: 16 BRPM | TEMPERATURE: 97 F

## 2017-02-07 DIAGNOSIS — C83.30 DLBCL (DIFFUSE LARGE B CELL LYMPHOMA) (HCC): ICD-10-CM

## 2017-02-07 DIAGNOSIS — Z45.2 ENCOUNTER FOR ADJUSTMENT OR MANAGEMENT OF VASCULAR ACCESS DEVICE: ICD-10-CM

## 2017-02-07 DIAGNOSIS — C83.30 DLBCL (DIFFUSE LARGE B CELL LYMPHOMA) (HCC): Primary | ICD-10-CM

## 2017-02-07 DIAGNOSIS — Z45.2 ENCOUNTER FOR ADJUSTMENT OR MANAGEMENT OF VASCULAR ACCESS DEVICE: Primary | ICD-10-CM

## 2017-02-07 LAB
ALBUMIN SERPL-MCNC: 3.6 G/DL (ref 3.4–4.8)
ALBUMIN/GLOB SERPL: 1.2 G/DL (ref 1.1–1.8)
ALP SERPL-CCNC: 93 U/L (ref 38–126)
ALT SERPL W P-5'-P-CCNC: 26 U/L (ref 21–72)
ANION GAP SERPL CALCULATED.3IONS-SCNC: 10 MMOL/L (ref 5–15)
AST SERPL-CCNC: 27 U/L (ref 17–59)
BASOPHILS # BLD MANUAL: 0.19 10*3/MM3 (ref 0–0.2)
BASOPHILS NFR BLD AUTO: 2 % (ref 0–2)
BILIRUB SERPL-MCNC: 0.4 MG/DL (ref 0.2–1.3)
BUN BLD-MCNC: 8 MG/DL (ref 7–21)
BUN/CREAT SERPL: 10.1 (ref 7–25)
CALCIUM SPEC-SCNC: 9.3 MG/DL (ref 8.4–10.2)
CHLORIDE SERPL-SCNC: 103 MMOL/L (ref 95–110)
CO2 SERPL-SCNC: 26 MMOL/L (ref 22–31)
CREAT BLD-MCNC: 0.79 MG/DL (ref 0.7–1.3)
DEPRECATED RDW RBC AUTO: 40.3 FL (ref 35.1–43.9)
EOSINOPHIL # BLD MANUAL: 0.19 10*3/MM3 (ref 0–0.7)
EOSINOPHIL NFR BLD MANUAL: 2 % (ref 0–7)
ERYTHROCYTE [DISTWIDTH] IN BLOOD BY AUTOMATED COUNT: 13.3 % (ref 11.5–14.5)
GFR SERPL CREATININE-BSD FRML MDRD: 95 ML/MIN/1.73 (ref 42–98)
GLOBULIN UR ELPH-MCNC: 3 GM/DL (ref 2.3–3.5)
GLUCOSE BLD-MCNC: 88 MG/DL (ref 60–100)
HCT VFR BLD AUTO: 36.7 % (ref 39–49)
HGB BLD-MCNC: 12.8 G/DL (ref 13.7–17.3)
LYMPHOCYTES # BLD MANUAL: 1.03 10*3/MM3 (ref 0.6–4.2)
LYMPHOCYTES NFR BLD MANUAL: 11 % (ref 10–50)
LYMPHOCYTES NFR BLD MANUAL: 8 % (ref 0–12)
MCH RBC QN AUTO: 29 PG (ref 26.5–34)
MCHC RBC AUTO-ENTMCNC: 34.9 G/DL (ref 31.5–36.3)
MCV RBC AUTO: 83 FL (ref 80–98)
MONOCYTES # BLD AUTO: 0.75 10*3/MM3 (ref 0–0.9)
NEUTROPHILS # BLD AUTO: 7.14 10*3/MM3 (ref 2–8.6)
NEUTROPHILS NFR BLD MANUAL: 75 % (ref 37–80)
NEUTS BAND NFR BLD MANUAL: 1 % (ref 0–5)
PLATELET # BLD AUTO: 396 10*3/MM3 (ref 150–450)
PMV BLD AUTO: 9.8 FL (ref 8–12)
POTASSIUM BLD-SCNC: 4.5 MMOL/L (ref 3.5–5.1)
PROT SERPL-MCNC: 6.6 G/DL (ref 6.3–8.6)
RBC # BLD AUTO: 4.42 10*6/MM3 (ref 4.37–5.74)
RBC MORPH BLD: NORMAL
SMALL PLATELETS BLD QL SMEAR: ADEQUATE
SODIUM BLD-SCNC: 139 MMOL/L (ref 137–145)
VARIANT LYMPHS NFR BLD MANUAL: 1 % (ref 0–5)
WBC MORPH BLD: NORMAL
WBC NRBC COR # BLD: 9.4 10*3/MM3 (ref 3.2–9.8)

## 2017-02-07 PROCEDURE — 25010000002 RITUXIMAB 100 MG/10ML SOLUTION 10 ML VIAL: Performed by: INTERNAL MEDICINE

## 2017-02-07 PROCEDURE — 25010000002 DIPHENHYDRAMINE PER 50 MG: Performed by: INTERNAL MEDICINE

## 2017-02-07 PROCEDURE — 25010000002 CYCLOPHOSPHAMIDE PER 100 MG: Performed by: INTERNAL MEDICINE

## 2017-02-07 PROCEDURE — 36591 DRAW BLOOD OFF VENOUS DEVICE: CPT

## 2017-02-07 PROCEDURE — 96413 CHEMO IV INFUSION 1 HR: CPT | Performed by: INTERNAL MEDICINE

## 2017-02-07 PROCEDURE — 96375 TX/PRO/DX INJ NEW DRUG ADDON: CPT | Performed by: INTERNAL MEDICINE

## 2017-02-07 PROCEDURE — 25010000002 VINCRISTINE PER 1 MG: Performed by: INTERNAL MEDICINE

## 2017-02-07 PROCEDURE — 85007 BL SMEAR W/DIFF WBC COUNT: CPT | Performed by: INTERNAL MEDICINE

## 2017-02-07 PROCEDURE — 25010000002 DOXORUBICIN PER 10 MG: Performed by: INTERNAL MEDICINE

## 2017-02-07 PROCEDURE — 96415 CHEMO IV INFUSION ADDL HR: CPT | Performed by: INTERNAL MEDICINE

## 2017-02-07 PROCEDURE — 25010000002 HEPARIN FLUSH (PORCINE) 100 UNIT/ML SOLUTION: Performed by: INTERNAL MEDICINE

## 2017-02-07 PROCEDURE — 25010000002 PALONOSETRON PER 25 MCG: Performed by: INTERNAL MEDICINE

## 2017-02-07 PROCEDURE — 96417 CHEMO IV INFUS EACH ADDL SEQ: CPT | Performed by: INTERNAL MEDICINE

## 2017-02-07 PROCEDURE — 25010000002 RITUXIMAB 500 MG/50ML SOLUTION 50 ML VIAL: Performed by: INTERNAL MEDICINE

## 2017-02-07 PROCEDURE — 96411 CHEMO IV PUSH ADDL DRUG: CPT | Performed by: INTERNAL MEDICINE

## 2017-02-07 PROCEDURE — 99214 OFFICE O/P EST MOD 30 MIN: CPT | Performed by: INTERNAL MEDICINE

## 2017-02-07 PROCEDURE — 25010000003 DEXAMETHASONE SODIUM PHOSPHATE 100 MG/10ML SOLUTION 10 ML VIAL: Performed by: INTERNAL MEDICINE

## 2017-02-07 PROCEDURE — 85025 COMPLETE CBC W/AUTO DIFF WBC: CPT | Performed by: INTERNAL MEDICINE

## 2017-02-07 PROCEDURE — 96367 TX/PROPH/DG ADDL SEQ IV INF: CPT | Performed by: INTERNAL MEDICINE

## 2017-02-07 PROCEDURE — 80053 COMPREHEN METABOLIC PANEL: CPT | Performed by: INTERNAL MEDICINE

## 2017-02-07 RX ORDER — SODIUM CHLORIDE 0.9 % (FLUSH) 0.9 %
10 SYRINGE (ML) INJECTION AS NEEDED
Status: DISCONTINUED | OUTPATIENT
Start: 2017-02-07 | End: 2017-02-07 | Stop reason: HOSPADM

## 2017-02-07 RX ORDER — SODIUM CHLORIDE 0.9 % (FLUSH) 0.9 %
10 SYRINGE (ML) INJECTION AS NEEDED
Status: CANCELLED | OUTPATIENT
Start: 2017-02-07

## 2017-02-07 RX ORDER — DIPHENHYDRAMINE HYDROCHLORIDE 50 MG/ML
50 INJECTION INTRAMUSCULAR; INTRAVENOUS AS NEEDED
Status: DISCONTINUED | OUTPATIENT
Start: 2017-02-07 | End: 2017-02-07 | Stop reason: HOSPADM

## 2017-02-07 RX ORDER — SODIUM CHLORIDE 9 MG/ML
250 INJECTION, SOLUTION INTRAVENOUS ONCE
Status: CANCELLED | OUTPATIENT
Start: 2017-02-07

## 2017-02-07 RX ORDER — ACETAMINOPHEN 325 MG/1
650 TABLET ORAL ONCE
Status: COMPLETED | OUTPATIENT
Start: 2017-02-07 | End: 2017-02-07

## 2017-02-07 RX ORDER — FAMOTIDINE 10 MG/ML
20 INJECTION, SOLUTION INTRAVENOUS AS NEEDED
Status: CANCELLED | OUTPATIENT
Start: 2017-02-07

## 2017-02-07 RX ORDER — MEPERIDINE HYDROCHLORIDE 50 MG/ML
25 INJECTION INTRAMUSCULAR; INTRAVENOUS; SUBCUTANEOUS
Status: CANCELLED | OUTPATIENT
Start: 2017-02-07

## 2017-02-07 RX ORDER — PALONOSETRON 0.05 MG/ML
0.25 INJECTION, SOLUTION INTRAVENOUS ONCE
Status: CANCELLED | OUTPATIENT
Start: 2017-02-07

## 2017-02-07 RX ORDER — FAMOTIDINE 10 MG/ML
20 INJECTION, SOLUTION INTRAVENOUS AS NEEDED
Status: DISCONTINUED | OUTPATIENT
Start: 2017-02-07 | End: 2017-02-07 | Stop reason: HOSPADM

## 2017-02-07 RX ORDER — DEXAMETHASONE 4 MG/1
4 TABLET ORAL
Qty: 4 TABLET | Refills: 0 | Status: SHIPPED | OUTPATIENT
Start: 2017-02-07 | End: 2017-02-11

## 2017-02-07 RX ORDER — ACETAMINOPHEN 325 MG/1
650 TABLET ORAL ONCE
Status: CANCELLED | OUTPATIENT
Start: 2017-02-28

## 2017-02-07 RX ORDER — ACETAMINOPHEN 325 MG/1
650 TABLET ORAL ONCE
Status: CANCELLED | OUTPATIENT
Start: 2017-02-07

## 2017-02-07 RX ORDER — SODIUM CHLORIDE 9 MG/ML
250 INJECTION, SOLUTION INTRAVENOUS ONCE
Status: COMPLETED | OUTPATIENT
Start: 2017-02-07 | End: 2017-02-07

## 2017-02-07 RX ORDER — DOXORUBICIN HYDROCHLORIDE 2 MG/ML
50 INJECTION, SOLUTION INTRAVENOUS ONCE
Status: CANCELLED | OUTPATIENT
Start: 2017-02-07

## 2017-02-07 RX ORDER — DIPHENHYDRAMINE HYDROCHLORIDE 50 MG/ML
50 INJECTION INTRAMUSCULAR; INTRAVENOUS AS NEEDED
Status: CANCELLED | OUTPATIENT
Start: 2017-02-07

## 2017-02-07 RX ORDER — MEPERIDINE HYDROCHLORIDE 50 MG/ML
25 INJECTION INTRAMUSCULAR; INTRAVENOUS; SUBCUTANEOUS
Status: DISCONTINUED | OUTPATIENT
Start: 2017-02-07 | End: 2017-02-07 | Stop reason: HOSPADM

## 2017-02-07 RX ORDER — PALONOSETRON 0.05 MG/ML
0.25 INJECTION, SOLUTION INTRAVENOUS ONCE
Status: CANCELLED | OUTPATIENT
Start: 2017-02-28

## 2017-02-07 RX ORDER — DOXORUBICIN HYDROCHLORIDE 2 MG/ML
50 INJECTION, SOLUTION INTRAVENOUS ONCE
Status: COMPLETED | OUTPATIENT
Start: 2017-02-07 | End: 2017-02-07

## 2017-02-07 RX ORDER — PROCHLORPERAZINE MALEATE 10 MG
10 TABLET ORAL EVERY 6 HOURS PRN
Qty: 40 TABLET | Refills: 3 | Status: SHIPPED | OUTPATIENT
Start: 2017-02-07 | End: 2017-12-08

## 2017-02-07 RX ORDER — PALONOSETRON 0.05 MG/ML
0.25 INJECTION, SOLUTION INTRAVENOUS ONCE
Status: COMPLETED | OUTPATIENT
Start: 2017-02-07 | End: 2017-02-07

## 2017-02-07 RX ADMIN — PALONOSETRON HYDROCHLORIDE 0.25 MG: 0.25 INJECTION INTRAVENOUS at 10:27

## 2017-02-07 RX ADMIN — DIPHENHYDRAMINE HYDROCHLORIDE 50 MG: 50 INJECTION INTRAMUSCULAR; INTRAVENOUS at 10:42

## 2017-02-07 RX ADMIN — SODIUM CHLORIDE 250 ML: 9 INJECTION, SOLUTION INTRAVENOUS at 09:48

## 2017-02-07 RX ADMIN — ACETAMINOPHEN 650 MG: 325 TABLET ORAL at 10:18

## 2017-02-07 RX ADMIN — VINCRISTINE SULFATE 2 MG: 1 INJECTION, SOLUTION INTRAVENOUS at 15:17

## 2017-02-07 RX ADMIN — DEXAMETHASONE SODIUM PHOSPHATE 12 MG: 10 INJECTION, SOLUTION INTRAMUSCULAR; INTRAVENOUS at 11:18

## 2017-02-07 RX ADMIN — Medication 10 ML: at 16:33

## 2017-02-07 RX ADMIN — Medication 10 ML: at 09:08

## 2017-02-07 RX ADMIN — SODIUM CHLORIDE, PRESERVATIVE FREE 500 UNITS: 5 INJECTION INTRAVENOUS at 16:33

## 2017-02-07 RX ADMIN — CYCLOPHOSPHAMIDE 1490 MG: 1 INJECTION, POWDER, FOR SOLUTION INTRAVENOUS; ORAL at 14:39

## 2017-02-07 RX ADMIN — RITUXIMAB 750 MG: 10 INJECTION, SOLUTION INTRAVENOUS at 11:50

## 2017-02-07 RX ADMIN — DOXORUBICIN HYDROCHLORIDE 100 MG: 2 INJECTION, SOLUTION INTRAVENOUS at 15:49

## 2017-02-07 NOTE — PROGRESS NOTES
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  Left testis diagnosed in November 2016. patient was recently started on chemotherapy with R-CHOP on January 17, 2017.  Patient is here for a second cycle of R-CHOP today.  Patient was seen in the emergency room on February 2, 2017 for episode of seizure.  Had a CT brain done on that day which was negative for any acute intracranial abnormality.  Patient denies any more seizure activity after visiting the ER.  States with the last chemotherapy had excessive nausea and vomiting lasting about 4 days after chemotherapy.  Denies any diarrhea.  Denies any headache or neck pain today.  Denies any fever or chills or night sweats.    PAST MEDICAL HISTORY:    Past Medical History   Diagnosis Date   • Benign neoplasm of skin of eyelid      probable pyogenic granuloma YAA   • Conjunctival cyst      CARUNCLE OS   • Cortical senile cataract    • Diabetes mellitus    • Hypertension    • Lymphoma of testis 11/2016   • Malignant neoplasm of upper eyelid      sebaceous gland CA, YAA margin, completely excised, no recurrence   • Nuclear cataract    • Seizures 11/2016     1ST SEIZURE, 2ND SEIZURE TODAY       SOCIAL HISTORY:    Social History   Substance Use Topics   • Smoking status: Former Smoker   • Smokeless tobacco: None   • Alcohol use No       Surgical History :  Past Surgical History   Procedure Laterality Date   • Excision lesion  08/13/2013     EYELID EXCISIONAL BIOPSY 07151 (Neoplasm, skin eyelid, benign)    • Back surgery     • Orchiectomy         ALLERGIES:    Allergies   Allergen Reactions   • Crestor [Rosuvastatin Calcium]    • Levofloxacin    • Penicillins    • Sulfa Antibiotics    • Tramadol        REVIEW OF SYSTEMS:      CONSTITUTIONAL: Positive for fatigue.  No fever, chills, or night  "sweats.     HEENT:  No epistaxis, mouth sores, or difficulty swallowing.    RESPIRATORY:  No new shortness of breath or cough at present.    CARDIOVASCULAR:  No chest pain or palpitations.    GASTROINTESTINAL: Had excessive nausea and vomiting for 4 days after her last chemotherapy.  Denies any nausea or vomiting today.  No abdominal pain or blood in the stool.    GENITOURINARY:  No dysuria or hematuria.    MUSCULOSKELETAL:  No any new back pain or arthralgias.     NEUROLOGICAL: Had second episode of seizure on February 2, 2017.  Denies any seizure like activity after visiting years on February 2, 2017.  No tingling or numbness. No new headache or dizziness.     LYMPHATICS:  Denies any abnormal swollen and anywhere in the body.    SKIN:  Denies any new skin rash.    PHYSICAL EXAMINATION:      VITAL SIGNS:    Visit Vitals   • /75   • Pulse 54   • Temp 97 °F (36.1 °C)   • Resp 16   • Ht 66\" (167.6 cm)   • Wt 191 lb 1.6 oz (86.7 kg)   • BMI 30.84 kg/m2       GENERAL:  Not in any distress.     EYES:  Mild pallor. No icterus.    NECK:  No adenopathy.    RESPIRATORY:  Fair air entry bilaterally. No rhonchi or wheezing.    CARDIOVASCULAR:  S1, S2. Regular rate and rhythm.    ABDOMEN:  Soft, nontender. Bowel sounds present.    EXTREMITIES:  No edema.    NEUROLOGICAL:  Alert, awake, oriented x3. No deficits.    DIAGNOSTIC DATA:    GLUCOSE   Date Value Ref Range Status   02/07/2017 88 60 - 100 mg/dL Final   01/17/2017 92 60 - 100 mg/dl Final     SODIUM   Date Value Ref Range Status   02/07/2017 139 137 - 145 mmol/L Final   01/17/2017 139 137 - 145 mmol/L Final     POTASSIUM   Date Value Ref Range Status   02/07/2017 4.5 3.5 - 5.1 mmol/L Final   01/17/2017 4.4 3.5 - 5.1 mmol/L Final     CO2   Date Value Ref Range Status   02/07/2017 26.0 22.0 - 31.0 mmol/L Final   01/17/2017 27 22 - 31 mmol/L Final     CHLORIDE   Date Value Ref Range Status   02/07/2017 103 95 - 110 mmol/L Final   01/17/2017 102 95 - 110 mmol/L Final "     ANION GAP   Date Value Ref Range Status   02/07/2017 10.0 5.0 - 15.0 mmol/L Final   01/17/2017 10.0 5.0 - 15.0 mmol/L Final     CREATININE   Date Value Ref Range Status   02/07/2017 0.79 0.70 - 1.30 mg/dL Final   01/17/2017 0.9 0.7 - 1.3 mg/dl Final     BUN   Date Value Ref Range Status   02/07/2017 8 7 - 21 mg/dL Final   01/17/2017 13 7 - 21 mg/dl Final     BUN/CREATININE RATIO   Date Value Ref Range Status   02/07/2017 10.1 7.0 - 25.0 Final     CALCIUM   Date Value Ref Range Status   02/07/2017 9.3 8.4 - 10.2 mg/dL Final   01/17/2017 8.8 8.4 - 10.2 mg/dl Final     EGFR NON  AMER   Date Value Ref Range Status   02/07/2017 95 42 - 98 mL/min/1.73 Final     ALKALINE PHOSPHATASE   Date Value Ref Range Status   02/07/2017 93 38 - 126 U/L Final   01/17/2017 88 38 - 126 U/L Final     TOTAL PROTEIN   Date Value Ref Range Status   02/07/2017 6.6 6.3 - 8.6 g/dL Final   01/17/2017 6.7 6.3 - 8.6 gm/dl Final     ALT (SGPT)   Date Value Ref Range Status   02/07/2017 26 21 - 72 U/L Final   01/17/2017 31 21 - 72 U/L Final     AST (SGOT)   Date Value Ref Range Status   02/07/2017 27 17 - 59 U/L Final   01/17/2017 30 17 - 59 U/L Final     TOTAL BILIRUBIN   Date Value Ref Range Status   02/07/2017 0.4 0.2 - 1.3 mg/dL Final   01/17/2017 0.5 0.2 - 1.3 mg/dl Final     ALBUMIN   Date Value Ref Range Status   02/07/2017 3.60 3.40 - 4.80 g/dL Final   01/17/2017 3.7 3.4 - 4.8 gm/dl Final     GLOBULIN   Date Value Ref Range Status   02/07/2017 3.0 2.3 - 3.5 gm/dL Final     A/G RATIO   Date Value Ref Range Status   02/07/2017 1.2 1.1 - 1.8 g/dL Final     Lab Results   Component Value Date    WBC 9.40 02/07/2017    HGB 12.8 (L) 02/07/2017    HCT 36.7 (L) 02/07/2017    MCV 83.0 02/07/2017     02/07/2017     Lab Results   Component Value Date    NEUTROABS 10.15 (H) 02/02/2017     Lab Results   Component Value Date    AFPTM 2.7 12/30/2016    HCGQUANT 1 12/30/2016   ]    PATHOLOGY:  Pathology report from November 2016  shows:  FINAL DIAGNOSIS:  LEFT TESTIS:       MALIGNANT LYMPHOMA, DIFFUSE LARGE B-CELL.    COMMENT:  The following special stains were performed on block A5:  AE1-AE3  negative, GAL negative, LCA positive and CD20 positive.    RADIOLOGY DATA :  PET CT from January 2, 2017 showed:  CONCLUSION-  1. Focus of intense activity associated with a 2.2 cm round structure  in the region of the ileocecal valve, apparently contiguous with the  distal ileum, most likely representing a physiologically active loop  of bowel. However given its focal appearance on both PET and CT  imaging, neoplasm should also be considered. This could be further  evaluated with a small bowel follow-through or a dedicated CT of the  abdomen and pelvis with oral and IV contrast.  2. Focal activity (SUV max 5.0) in the left inguinal canal may  represent a neoplastic, postoperative, inflammatory, or infectious  etiology.  3. Solitary sclerotic focus in the posterior right seventh rib  measuring 5 mm, not associated with hypermetabolic activity, most  likely a bone island      CT of head without contrast done on February 2, 2016 showed:   IMPRESSION:  1. No acute intracranial abnormality.  2. Pansinusitis.       ASSESSMENT AND PLAN:      1.  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  Diagnosed in November 2016.  Patient had a PET/CT done which was negative for any abnormal activity.  All patient could not have a complete staging workup with a lumbar puncture ,CSF analysis secondary to being on aspirin.  Patient also did not get intrathecal chemotherapy for the  same reason being on aspirin.  At this point we will go head with a second dose of R CHOP today.  We will send a prescription for Compazine as well as Decadron 4 mg daily for next 4 days in view of excessive nausea and vomiting with last chemotherapy.    2.  Second seizure episode on February 2, 2017.  Initially in November 2016 after diagnosis patient was on trauma done and developed  seizure activity, for which patient was evaluated at [facility] in [place].  At that point evaluation was negative for any CNS cause and was believed was secondary from tramadol.   patient was instructed to get in touch with the neurology team at the [facility] and gets seen by them in view of second episode of seizure.  Patient has been started on Keppra by the emergency room and still taking it.  Denies any seizure-like activity since February 2, 2017.    3.  Hypertension    4.  History of sebaceous carcinoma of left upper eyelid, status post surgery and radiation    5.  Health maintenance: Patient does not smoke.  Had a colonoscopy done on January 13, 2017 was negative for any malignancy.  He remains full code.       Conrado Ahumada MD  2/7/2017  9:40 AM

## 2017-02-07 NOTE — PATIENT INSTRUCTIONS
Patient was instructed to come to emergency room if any fever  >100.4    Patient was encourage to call us with any unusual side effects after getting Chemotherapy.    Take your prescribed antinausea medications every 6 hours as required for Nausea/Vomitting.    Avoid excessive dairy products in case of diarrhea from chemotherapy.

## 2017-02-08 ENCOUNTER — INFUSION (OUTPATIENT)
Dept: ONCOLOGY | Facility: HOSPITAL | Age: 77
End: 2017-02-08

## 2017-02-08 DIAGNOSIS — C83.30 DLBCL (DIFFUSE LARGE B CELL LYMPHOMA) (HCC): Primary | ICD-10-CM

## 2017-02-08 DIAGNOSIS — R56.9 SEIZURES (HCC): Primary | ICD-10-CM

## 2017-02-08 PROCEDURE — 96372 THER/PROPH/DIAG INJ SC/IM: CPT | Performed by: INTERNAL MEDICINE

## 2017-02-08 PROCEDURE — 25010000002 PEGFILGRASTIM 6 MG/0.6ML SOLUTION PREFILLED SYRINGE: Performed by: INTERNAL MEDICINE

## 2017-02-08 RX ADMIN — PEGFILGRASTIM 6 MG: 6 INJECTION SUBCUTANEOUS at 16:43

## 2017-02-14 ENCOUNTER — TELEPHONE (OUTPATIENT)
Dept: NUTRITION | Facility: HOSPITAL | Age: 77
End: 2017-02-14

## 2017-02-14 NOTE — PROGRESS NOTES
Adult Outpatient Nutrition  Assessment    Patient Name:  Sharan Steiner  YOB: 1940  MRN: [id number]        Assessment Date:  2/14/2017                        Comments:  CHART REVIEW  Sharan Steiner   1940  76 y.o.  Wt: 191 lb  Ht: 66 inch  Dx: testicular lymphoma  Tx: chemo     PATIENT/FAMILY COMMENTS  Usual Body Weight: 140  Weight Change: Has gained weight over past 5 years.  Diet: regular   Food Allergies: nkfa   Number of Feedings Daily: 3  Appetite/Intake: good   Supplements: none   Meal Preparation: Wife cooks.  Nutrition Concerns:  -Fatigue   -N/V post 1st tx (controlled post 2nd tx)            GOAL(s)  -to optimize nutrition in attempt to prevent loss of LBM          EDUCATION  Discussed  -high protein diet  -importance of staying hydrated  -food safety    To reinforce education, written information with RD's name & number mailed.  Patient receptive to suggestions--agreed to call on dietitian as needed.              Teresa Chavez RD                Electronically signed by:  Teresa Chavez RD  02/14/17 10:14 AM

## 2017-02-16 ENCOUNTER — TELEPHONE (OUTPATIENT)
Dept: ONCOLOGY | Facility: HOSPITAL | Age: 77
End: 2017-02-16

## 2017-02-16 ENCOUNTER — HOSPITAL ENCOUNTER (INPATIENT)
Facility: HOSPITAL | Age: 77
LOS: 4 days | Discharge: HOME HEALTH | End: 2017-02-20
Attending: FAMILY MEDICINE | Admitting: INTERNAL MEDICINE

## 2017-02-16 ENCOUNTER — APPOINTMENT (OUTPATIENT)
Dept: GENERAL RADIOLOGY | Facility: HOSPITAL | Age: 77
End: 2017-02-16

## 2017-02-16 DIAGNOSIS — R50.9 FEVER, UNSPECIFIED FEVER CAUSE: Primary | ICD-10-CM

## 2017-02-16 DIAGNOSIS — B99.9 IMMUNOCOMPROMISED STATUS ASSOCIATED WITH INFECTION (HCC): ICD-10-CM

## 2017-02-16 DIAGNOSIS — C83.30 DLBCL (DIFFUSE LARGE B CELL LYMPHOMA) (HCC): ICD-10-CM

## 2017-02-16 DIAGNOSIS — A41.51 SEPSIS DUE TO ESCHERICHIA COLI (HCC): ICD-10-CM

## 2017-02-16 DIAGNOSIS — D84.81 IMMUNOCOMPROMISED STATUS ASSOCIATED WITH INFECTION (HCC): ICD-10-CM

## 2017-02-16 DIAGNOSIS — A41.9 SEPSIS, DUE TO UNSPECIFIED ORGANISM: ICD-10-CM

## 2017-02-16 LAB
ALBUMIN SERPL-MCNC: 3.2 G/DL (ref 3.4–4.8)
ALBUMIN SERPL-MCNC: 3.2 G/DL (ref 3.4–4.8)
ALBUMIN/GLOB SERPL: 1.2 G/DL (ref 1.1–1.8)
ALBUMIN/GLOB SERPL: 1.3 G/DL (ref 1.1–1.8)
ALP SERPL-CCNC: 105 U/L (ref 38–126)
ALP SERPL-CCNC: 112 U/L (ref 38–126)
ALT SERPL W P-5'-P-CCNC: 28 U/L (ref 21–72)
ALT SERPL W P-5'-P-CCNC: 29 U/L (ref 21–72)
ANION GAP SERPL CALCULATED.3IONS-SCNC: 6 MMOL/L (ref 5–15)
ANION GAP SERPL CALCULATED.3IONS-SCNC: 9 MMOL/L (ref 5–15)
ANISOCYTOSIS BLD QL: NORMAL
AST SERPL-CCNC: 19 U/L (ref 17–59)
AST SERPL-CCNC: 21 U/L (ref 17–59)
BACTERIA UR QL AUTO: ABNORMAL /HPF
BASOPHILS # BLD AUTO: 0.04 10*3/MM3 (ref 0–0.2)
BASOPHILS # BLD AUTO: 0.08 10*3/MM3 (ref 0–0.2)
BASOPHILS NFR BLD AUTO: 0.8 % (ref 0–2)
BASOPHILS NFR BLD AUTO: 1.5 % (ref 0–2)
BILIRUB SERPL-MCNC: 0.5 MG/DL (ref 0.2–1.3)
BILIRUB SERPL-MCNC: 0.6 MG/DL (ref 0.2–1.3)
BILIRUB UR QL STRIP: NEGATIVE
BUN BLD-MCNC: 11 MG/DL (ref 7–21)
BUN BLD-MCNC: 11 MG/DL (ref 7–21)
BUN/CREAT SERPL: 11.7 (ref 7–25)
BUN/CREAT SERPL: 12.6 (ref 7–25)
CALCIUM SPEC-SCNC: 8.1 MG/DL (ref 8.4–10.2)
CALCIUM SPEC-SCNC: 8.5 MG/DL (ref 8.4–10.2)
CHLORIDE SERPL-SCNC: 100 MMOL/L (ref 95–110)
CHLORIDE SERPL-SCNC: 96 MMOL/L (ref 95–110)
CLARITY UR: ABNORMAL
CO2 SERPL-SCNC: 25 MMOL/L (ref 22–31)
CO2 SERPL-SCNC: 28 MMOL/L (ref 22–31)
COLOR UR: ABNORMAL
CREAT BLD-MCNC: 0.87 MG/DL (ref 0.7–1.3)
CREAT BLD-MCNC: 0.94 MG/DL (ref 0.7–1.3)
D-LACTATE SERPL-SCNC: 0.9 MMOL/L (ref 0.5–2)
D-LACTATE SERPL-SCNC: 1.4 MMOL/L (ref 0.5–2)
DEPRECATED RDW RBC AUTO: 40.4 FL (ref 35.1–43.9)
DEPRECATED RDW RBC AUTO: 42.2 FL (ref 35.1–43.9)
EOSINOPHIL # BLD AUTO: 0.11 10*3/MM3 (ref 0–0.7)
EOSINOPHIL # BLD AUTO: 0.12 10*3/MM3 (ref 0–0.7)
EOSINOPHIL NFR BLD AUTO: 2 % (ref 0–7)
EOSINOPHIL NFR BLD AUTO: 2.4 % (ref 0–7)
ERYTHROCYTE [DISTWIDTH] IN BLOOD BY AUTOMATED COUNT: 13.4 % (ref 11.5–14.5)
ERYTHROCYTE [DISTWIDTH] IN BLOOD BY AUTOMATED COUNT: 13.8 % (ref 11.5–14.5)
GFR SERPL CREATININE-BSD FRML MDRD: 78 ML/MIN/1.73 (ref 42–98)
GFR SERPL CREATININE-BSD FRML MDRD: 85 ML/MIN/1.73 (ref 42–98)
GLOBULIN UR ELPH-MCNC: 2.5 GM/DL (ref 2.3–3.5)
GLOBULIN UR ELPH-MCNC: 2.6 GM/DL (ref 2.3–3.5)
GLUCOSE BLD-MCNC: 96 MG/DL (ref 60–100)
GLUCOSE BLD-MCNC: 97 MG/DL (ref 60–100)
GLUCOSE UR STRIP-MCNC: NEGATIVE MG/DL
HCT VFR BLD AUTO: 31.6 % (ref 39–49)
HCT VFR BLD AUTO: 33.2 % (ref 39–49)
HGB BLD-MCNC: 10.8 G/DL (ref 13.7–17.3)
HGB BLD-MCNC: 11.1 G/DL (ref 13.7–17.3)
HGB UR QL STRIP.AUTO: ABNORMAL
HOLD SPECIMEN: NORMAL
HOLD SPECIMEN: NORMAL
HYALINE CASTS UR QL AUTO: ABNORMAL /LPF
HYPOCHROMIA BLD QL: NORMAL
IMM GRANULOCYTES # BLD: 0.07 10*3/MM3 (ref 0–0.02)
IMM GRANULOCYTES # BLD: 0.25 10*3/MM3 (ref 0–0.02)
IMM GRANULOCYTES NFR BLD: 1.4 % (ref 0–0.5)
IMM GRANULOCYTES NFR BLD: 4.6 % (ref 0–0.5)
KETONES UR QL STRIP: NEGATIVE
LEUKOCYTE ESTERASE UR QL STRIP.AUTO: ABNORMAL
LYMPHOCYTES # BLD AUTO: 0.36 10*3/MM3 (ref 0.6–4.2)
LYMPHOCYTES # BLD AUTO: 0.46 10*3/MM3 (ref 0.6–4.2)
LYMPHOCYTES NFR BLD AUTO: 7.2 % (ref 10–50)
LYMPHOCYTES NFR BLD AUTO: 8.5 % (ref 10–50)
MCH RBC QN AUTO: 28.2 PG (ref 26.5–34)
MCH RBC QN AUTO: 28.2 PG (ref 26.5–34)
MCHC RBC AUTO-ENTMCNC: 33.4 G/DL (ref 31.5–36.3)
MCHC RBC AUTO-ENTMCNC: 34.2 G/DL (ref 31.5–36.3)
MCV RBC AUTO: 82.5 FL (ref 80–98)
MCV RBC AUTO: 84.3 FL (ref 80–98)
MONOCYTES # BLD AUTO: 0.68 10*3/MM3 (ref 0–0.9)
MONOCYTES # BLD AUTO: 0.73 10*3/MM3 (ref 0–0.9)
MONOCYTES NFR BLD AUTO: 13.4 % (ref 0–12)
MONOCYTES NFR BLD AUTO: 13.6 % (ref 0–12)
NEUTROPHILS # BLD AUTO: 3.73 10*3/MM3 (ref 2–8.6)
NEUTROPHILS # BLD AUTO: 3.8 10*3/MM3 (ref 2–8.6)
NEUTROPHILS NFR BLD AUTO: 70 % (ref 37–80)
NEUTROPHILS NFR BLD AUTO: 74.6 % (ref 37–80)
NITRITE UR QL STRIP: POSITIVE
PH UR STRIP.AUTO: 6 [PH] (ref 5–9)
PLATELET # BLD AUTO: 169 10*3/MM3 (ref 150–450)
PLATELET # BLD AUTO: 175 10*3/MM3 (ref 150–450)
PMV BLD AUTO: 10.6 FL (ref 8–12)
PMV BLD AUTO: 10.8 FL (ref 8–12)
POTASSIUM BLD-SCNC: 4.1 MMOL/L (ref 3.5–5.1)
POTASSIUM BLD-SCNC: 4.2 MMOL/L (ref 3.5–5.1)
PROT SERPL-MCNC: 5.7 G/DL (ref 6.3–8.6)
PROT SERPL-MCNC: 5.8 G/DL (ref 6.3–8.6)
PROT UR QL STRIP: ABNORMAL
RBC # BLD AUTO: 3.83 10*6/MM3 (ref 4.37–5.74)
RBC # BLD AUTO: 3.94 10*6/MM3 (ref 4.37–5.74)
RBC # UR: ABNORMAL /HPF
REF LAB TEST METHOD: ABNORMAL
SMALL PLATELETS BLD QL SMEAR: ADEQUATE
SODIUM BLD-SCNC: 130 MMOL/L (ref 137–145)
SODIUM BLD-SCNC: 134 MMOL/L (ref 137–145)
SP GR UR STRIP: 1.02 (ref 1–1.03)
SQUAMOUS #/AREA URNS HPF: ABNORMAL /HPF
UROBILINOGEN UR QL STRIP: ABNORMAL
WBC MORPH BLD: NORMAL
WBC NRBC COR # BLD: 5 10*3/MM3 (ref 3.2–9.8)
WBC NRBC COR # BLD: 5.43 10*3/MM3 (ref 3.2–9.8)
WBC UR QL AUTO: ABNORMAL /HPF
WHOLE BLOOD HOLD SPECIMEN: NORMAL
WHOLE BLOOD HOLD SPECIMEN: NORMAL

## 2017-02-16 PROCEDURE — 25010000002 VANCOMYCIN PER 500 MG: Performed by: FAMILY MEDICINE

## 2017-02-16 PROCEDURE — 71020 HC CHEST PA AND LATERAL: CPT

## 2017-02-16 PROCEDURE — 87077 CULTURE AEROBIC IDENTIFY: CPT | Performed by: FAMILY MEDICINE

## 2017-02-16 PROCEDURE — 85025 COMPLETE CBC W/AUTO DIFF WBC: CPT | Performed by: FAMILY MEDICINE

## 2017-02-16 PROCEDURE — 36415 COLL VENOUS BLD VENIPUNCTURE: CPT

## 2017-02-16 PROCEDURE — 83605 ASSAY OF LACTIC ACID: CPT | Performed by: FAMILY MEDICINE

## 2017-02-16 PROCEDURE — 80053 COMPREHEN METABOLIC PANEL: CPT | Performed by: FAMILY MEDICINE

## 2017-02-16 PROCEDURE — 87086 URINE CULTURE/COLONY COUNT: CPT | Performed by: FAMILY MEDICINE

## 2017-02-16 PROCEDURE — 87040 BLOOD CULTURE FOR BACTERIA: CPT | Performed by: FAMILY MEDICINE

## 2017-02-16 PROCEDURE — 99284 EMERGENCY DEPT VISIT MOD MDM: CPT

## 2017-02-16 PROCEDURE — 81001 URINALYSIS AUTO W/SCOPE: CPT | Performed by: FAMILY MEDICINE

## 2017-02-16 PROCEDURE — 87804 INFLUENZA ASSAY W/OPTIC: CPT | Performed by: FAMILY MEDICINE

## 2017-02-16 PROCEDURE — 87186 SC STD MICRODIL/AGAR DIL: CPT | Performed by: FAMILY MEDICINE

## 2017-02-16 PROCEDURE — 85007 BL SMEAR W/DIFF WBC COUNT: CPT | Performed by: FAMILY MEDICINE

## 2017-02-16 RX ORDER — DOXAZOSIN 2 MG/1
4 TABLET ORAL NIGHTLY
Status: DISCONTINUED | OUTPATIENT
Start: 2017-02-16 | End: 2017-02-20 | Stop reason: HOSPADM

## 2017-02-16 RX ORDER — LEVETIRACETAM 500 MG/1
500 TABLET ORAL 2 TIMES DAILY
Status: DISCONTINUED | OUTPATIENT
Start: 2017-02-16 | End: 2017-02-20 | Stop reason: HOSPADM

## 2017-02-16 RX ORDER — HYDROCODONE BITARTRATE AND ACETAMINOPHEN 7.5; 325 MG/1; MG/1
1 TABLET ORAL EVERY 4 HOURS PRN
Status: DISCONTINUED | OUTPATIENT
Start: 2017-02-16 | End: 2017-02-20 | Stop reason: HOSPADM

## 2017-02-16 RX ORDER — ASPIRIN 81 MG/1
81 TABLET, CHEWABLE ORAL DAILY
Status: DISCONTINUED | OUTPATIENT
Start: 2017-02-17 | End: 2017-02-20 | Stop reason: HOSPADM

## 2017-02-16 RX ORDER — ATENOLOL 25 MG/1
25 TABLET ORAL EVERY 12 HOURS SCHEDULED
Status: DISCONTINUED | OUTPATIENT
Start: 2017-02-16 | End: 2017-02-20 | Stop reason: HOSPADM

## 2017-02-16 RX ORDER — SODIUM CHLORIDE 9 MG/ML
INJECTION, SOLUTION INTRAVENOUS
Status: COMPLETED
Start: 2017-02-16 | End: 2017-02-16

## 2017-02-16 RX ORDER — LISINOPRIL 10 MG/1
10 TABLET ORAL DAILY
Status: DISCONTINUED | OUTPATIENT
Start: 2017-02-17 | End: 2017-02-20 | Stop reason: HOSPADM

## 2017-02-16 RX ORDER — SODIUM CHLORIDE 0.9 % (FLUSH) 0.9 %
10 SYRINGE (ML) INJECTION AS NEEDED
Status: DISCONTINUED | OUTPATIENT
Start: 2017-02-16 | End: 2017-02-20 | Stop reason: HOSPADM

## 2017-02-16 RX ORDER — SODIUM CHLORIDE 0.9 % (FLUSH) 0.9 %
1-10 SYRINGE (ML) INJECTION AS NEEDED
Status: DISCONTINUED | OUTPATIENT
Start: 2017-02-16 | End: 2017-02-20 | Stop reason: HOSPADM

## 2017-02-16 RX ADMIN — SODIUM CHLORIDE 250 ML: 9 INJECTION, SOLUTION INTRAVENOUS at 21:27

## 2017-02-16 RX ADMIN — DOXAZOSIN 4 MG: 2 TABLET ORAL at 23:11

## 2017-02-16 RX ADMIN — SODIUM CHLORIDE 1000 ML/KG: 9 INJECTION, SOLUTION INTRAVENOUS at 19:17

## 2017-02-16 RX ADMIN — VANCOMYCIN HYDROCHLORIDE 1750 MG: 1 INJECTION, POWDER, LYOPHILIZED, FOR SOLUTION INTRAVENOUS at 21:07

## 2017-02-16 RX ADMIN — AZTREONAM 2 G: 2 INJECTION, POWDER, FOR SOLUTION INTRAMUSCULAR; INTRAVENOUS at 18:53

## 2017-02-16 RX ADMIN — LEVETIRACETAM 500 MG: 500 TABLET, FILM COATED ORAL at 23:11

## 2017-02-16 RX ADMIN — ATENOLOL 25 MG: 25 TABLET ORAL at 23:12

## 2017-02-16 RX ADMIN — SODIUM CHLORIDE 1000 ML: 9 INJECTION, SOLUTION INTRAVENOUS at 17:20

## 2017-02-16 NOTE — ED PROVIDER NOTES
Subjective   HPI Comments: Started chemotehrapy x 1 month, second dose on 2/7/17 at [facility] center with Dr burnett for diffuse large B cell lymphoma.    Patient is a 76 y.o. male presenting with fever.   Fever   Max temp prior to arrival:  102.4  Temp source:  Oral  Severity:  Moderate  Onset quality:  Gradual  Duration:  1 day  Timing:  Constant  Progression:  Worsening  Chronicity:  New  Relieved by:  Nothing  Ineffective treatments:  Ibuprofen  Associated symptoms: headaches and nausea    Associated symptoms: no chest pain, no chills, no confusion, no congestion, no cough, no diarrhea, no dysuria, no ear pain, no myalgias, no rash, no rhinorrhea, no sore throat and no vomiting    Risk factors: immunosuppression        Review of Systems   Constitutional: Positive for fever. Negative for appetite change, chills, diaphoresis and fatigue.   HENT: Negative for congestion, ear discharge, ear pain, nosebleeds, rhinorrhea, sinus pressure, sore throat and trouble swallowing.    Eyes: Negative for discharge and redness.   Respiratory: Negative for apnea, cough, chest tightness, shortness of breath and wheezing.    Cardiovascular: Negative for chest pain.   Gastrointestinal: Positive for nausea. Negative for abdominal pain, diarrhea and vomiting.   Endocrine: Negative for polyuria.   Genitourinary: Negative for dysuria, frequency and urgency.   Musculoskeletal: Negative for myalgias and neck pain.   Skin: Negative for color change and rash.   Allergic/Immunologic: Negative for immunocompromised state.   Neurological: Positive for dizziness (chronic), weakness (generalized) and headaches. Negative for seizures, syncope and light-headedness.   Hematological: Negative for adenopathy. Does not bruise/bleed easily.   Psychiatric/Behavioral: Negative for behavioral problems and confusion.   All other systems reviewed and are negative.      Past Medical History   Diagnosis Date   • Benign neoplasm of skin of eyelid      probable  pyogenic granuloma YAA   • Conjunctival cyst      CARUNCLE OS   • Cortical senile cataract    • Diabetes mellitus    • Hypertension    • Lymphoma of testis 11/2016   • Malignant neoplasm of upper eyelid      sebaceous gland CA, YAA margin, completely excised, no recurrence   • Nuclear cataract    • Seizures 11/2016     1ST SEIZURE, 2ND SEIZURE TODAY       Allergies   Allergen Reactions   • Crestor [Rosuvastatin Calcium]    • Levofloxacin    • Penicillins    • Sulfa Antibiotics    • Tramadol        Past Surgical History   Procedure Laterality Date   • Excision lesion  08/13/2013     EYELID EXCISIONAL BIOPSY 70732 (Neoplasm, skin eyelid, benign)    • Back surgery     • Orchiectomy         Family History   Problem Relation Age of Onset   • Diabetes Mother    • Hypertension Sister    • Cancer Sister    • Hypertension Brother    • Cancer Brother        Social History     Social History   • Marital status:      Spouse name: N/A   • Number of children: N/A   • Years of education: N/A     Social History Main Topics   • Smoking status: Former Smoker   • Smokeless tobacco: None   • Alcohol use No   • Drug use: None   • Sexual activity: Not Asked     Other Topics Concern   • None     Social History Narrative           Objective   Physical Exam   Constitutional: He is oriented to person, place, and time. He appears well-developed and well-nourished.   HENT:   Head: Normocephalic and atraumatic.   Nose: Nose normal.   Mouth/Throat: Oropharynx is clear and moist.   Eyes: Conjunctivae and EOM are normal. Pupils are equal, round, and reactive to light. Right eye exhibits no discharge. Left eye exhibits no discharge. No scleral icterus.   Neck: Normal range of motion. Neck supple. No tracheal deviation present.   Cardiovascular: Normal rate, regular rhythm and normal heart sounds.    No murmur heard.  Pulmonary/Chest: Effort normal and breath sounds normal. No stridor. No respiratory distress. He has no wheezes. He has no  rales.   Abdominal: Soft. Bowel sounds are normal. He exhibits no distension and no mass. There is no tenderness. There is no rebound and no guarding.   Musculoskeletal: He exhibits no edema.   Neurological: He is alert and oriented to person, place, and time. Coordination normal.   Skin: Skin is warm and dry. No rash noted. No erythema.   Psychiatric: He has a normal mood and affect. His behavior is normal. Thought content normal.   Nursing note and vitals reviewed.      Procedures         ED Course  ED Course                  MDM    Final diagnoses:   Fever, unspecified fever cause   Immunocompromised status associated with infection   Sepsis, due to unspecified organism            Roddy Fernandez MD  02/16/17 6611

## 2017-02-16 NOTE — TELEPHONE ENCOUNTER
Spoke with Dr. Ahumada and he recommended patient go to the ER. Called patient's daughter with recommendation. Advised to have patient wear mask when he goes to ER. Verbalized understanding.

## 2017-02-16 NOTE — TELEPHONE ENCOUNTER
----- Message from Madison Ag sent at 2/16/2017  3:08 PM CST -----  Regarding: fever of 101.7  Contact: [phone]  Pt's daughter has called and stated that pt is running fever of 101.7 and thinks pt may have uti.  What does she need to do?

## 2017-02-16 NOTE — ED NOTES
Presents to the ER with temp of 102.4; daughter reports new onset of urine hesitancy. Started chemotehrapy monthly Jan. 2017, second dose on 2/7/17 at [facility] with Dr. Ahumada for treatment of lymphoma.        Rosina Humphrey, RN  02/16/17 0750

## 2017-02-17 LAB
ALBUMIN SERPL-MCNC: 2.7 G/DL (ref 3.4–4.8)
ALBUMIN/GLOB SERPL: 1.1 G/DL (ref 1.1–1.8)
ALP SERPL-CCNC: 106 U/L (ref 38–126)
ALT SERPL W P-5'-P-CCNC: 28 U/L (ref 21–72)
ANION GAP SERPL CALCULATED.3IONS-SCNC: 3 MMOL/L (ref 5–15)
AST SERPL-CCNC: 18 U/L (ref 17–59)
BILIRUB SERPL-MCNC: 0.4 MG/DL (ref 0.2–1.3)
BUN BLD-MCNC: 10 MG/DL (ref 7–21)
BUN/CREAT SERPL: 12 (ref 7–25)
CALCIUM SPEC-SCNC: 8.1 MG/DL (ref 8.4–10.2)
CHLORIDE SERPL-SCNC: 103 MMOL/L (ref 95–110)
CO2 SERPL-SCNC: 29 MMOL/L (ref 22–31)
CREAT BLD-MCNC: 0.83 MG/DL (ref 0.7–1.3)
DEPRECATED RDW RBC AUTO: 41.1 FL (ref 35.1–43.9)
EOSINOPHIL # BLD MANUAL: 0.08 10*3/MM3 (ref 0–0.7)
EOSINOPHIL NFR BLD MANUAL: 1 % (ref 0–7)
ERYTHROCYTE [DISTWIDTH] IN BLOOD BY AUTOMATED COUNT: 13.6 % (ref 11.5–14.5)
FLUAV AG NPH QL: NEGATIVE
FLUBV AG NPH QL IA: NEGATIVE
GFR SERPL CREATININE-BSD FRML MDRD: 90 ML/MIN/1.73 (ref 60–98)
GLOBULIN UR ELPH-MCNC: 2.5 GM/DL (ref 2.3–3.5)
GLUCOSE BLD-MCNC: 97 MG/DL (ref 60–100)
HCT VFR BLD AUTO: 30.8 % (ref 39–49)
HGB BLD-MCNC: 10.3 G/DL (ref 13.7–17.3)
HOLD SPECIMEN: NORMAL
HOLD SPECIMEN: NORMAL
LYMPHOCYTES # BLD MANUAL: 0.46 10*3/MM3 (ref 0.6–4.2)
LYMPHOCYTES NFR BLD MANUAL: 5 % (ref 0–12)
LYMPHOCYTES NFR BLD MANUAL: 6 % (ref 10–50)
MCH RBC QN AUTO: 28 PG (ref 26.5–34)
MCHC RBC AUTO-ENTMCNC: 33.4 G/DL (ref 31.5–36.3)
MCV RBC AUTO: 83.7 FL (ref 80–98)
METAMYELOCYTES NFR BLD MANUAL: 2 % (ref 0–0)
MONOCYTES # BLD AUTO: 0.39 10*3/MM3 (ref 0–0.9)
NEUTROPHILS # BLD AUTO: 6.65 10*3/MM3 (ref 2–8.6)
NEUTROPHILS NFR BLD MANUAL: 84 % (ref 37–80)
NEUTS BAND NFR BLD MANUAL: 2 % (ref 0–5)
PLATELET # BLD AUTO: 140 10*3/MM3 (ref 150–450)
PMV BLD AUTO: 10.9 FL (ref 8–12)
POTASSIUM BLD-SCNC: 3.9 MMOL/L (ref 3.5–5.1)
PROT SERPL-MCNC: 5.2 G/DL (ref 6.3–8.6)
RBC # BLD AUTO: 3.68 10*6/MM3 (ref 4.37–5.74)
RBC MORPH BLD: NORMAL
SMALL PLATELETS BLD QL SMEAR: ABNORMAL
SODIUM BLD-SCNC: 135 MMOL/L (ref 137–145)
WBC MORPH BLD: NORMAL
WBC NRBC COR # BLD: 7.73 10*3/MM3 (ref 3.2–9.8)
WHOLE BLOOD HOLD SPECIMEN: NORMAL
WHOLE BLOOD HOLD SPECIMEN: NORMAL

## 2017-02-17 PROCEDURE — 25010000002 VANCOMYCIN PER 500 MG: Performed by: INTERNAL MEDICINE

## 2017-02-17 PROCEDURE — 85025 COMPLETE CBC W/AUTO DIFF WBC: CPT | Performed by: INTERNAL MEDICINE

## 2017-02-17 PROCEDURE — 99222 1ST HOSP IP/OBS MODERATE 55: CPT | Performed by: INTERNAL MEDICINE

## 2017-02-17 PROCEDURE — 85007 BL SMEAR W/DIFF WBC COUNT: CPT | Performed by: INTERNAL MEDICINE

## 2017-02-17 PROCEDURE — 80053 COMPREHEN METABOLIC PANEL: CPT | Performed by: INTERNAL MEDICINE

## 2017-02-17 RX ORDER — ACETAMINOPHEN 325 MG/1
650 TABLET ORAL EVERY 6 HOURS PRN
Status: DISCONTINUED | OUTPATIENT
Start: 2017-02-17 | End: 2017-02-20 | Stop reason: HOSPADM

## 2017-02-17 RX ORDER — ASPIRIN 325 MG
325 TABLET ORAL EVERY 6 HOURS PRN
Status: DISCONTINUED | OUTPATIENT
Start: 2017-02-17 | End: 2017-02-20 | Stop reason: HOSPADM

## 2017-02-17 RX ADMIN — ATENOLOL 25 MG: 25 TABLET ORAL at 10:10

## 2017-02-17 RX ADMIN — LEVETIRACETAM 500 MG: 500 TABLET, FILM COATED ORAL at 10:10

## 2017-02-17 RX ADMIN — LEVETIRACETAM 500 MG: 500 TABLET, FILM COATED ORAL at 17:40

## 2017-02-17 RX ADMIN — VANCOMYCIN HYDROCHLORIDE 1500 MG: 1 INJECTION, POWDER, LYOPHILIZED, FOR SOLUTION INTRAVENOUS at 21:48

## 2017-02-17 RX ADMIN — ACETAMINOPHEN 650 MG: 325 TABLET ORAL at 12:37

## 2017-02-17 RX ADMIN — AZTREONAM 2 G: 2 INJECTION, POWDER, FOR SOLUTION INTRAMUSCULAR; INTRAVENOUS at 06:04

## 2017-02-17 RX ADMIN — ATENOLOL 25 MG: 25 TABLET ORAL at 21:47

## 2017-02-17 RX ADMIN — AZTREONAM 2 G: 2 INJECTION, POWDER, FOR SOLUTION INTRAMUSCULAR; INTRAVENOUS at 23:38

## 2017-02-17 RX ADMIN — DOXAZOSIN 4 MG: 2 TABLET ORAL at 21:47

## 2017-02-17 RX ADMIN — ASPIRIN 81 MG 81 MG: 81 TABLET ORAL at 10:14

## 2017-02-17 RX ADMIN — ACETAMINOPHEN 650 MG: 325 TABLET ORAL at 01:12

## 2017-02-17 RX ADMIN — AZTREONAM 2 G: 2 INJECTION, POWDER, FOR SOLUTION INTRAMUSCULAR; INTRAVENOUS at 13:54

## 2017-02-17 RX ADMIN — Medication: at 10:12

## 2017-02-17 RX ADMIN — LISINOPRIL 10 MG: 10 TABLET ORAL at 10:10

## 2017-02-17 RX ADMIN — Medication 10 ML: at 01:47

## 2017-02-17 NOTE — CONSULTS
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 Left testis diagnosed in November 2016.  Patient is status post 2 cycles of chemotherapy with R-CHOP.  Last chemotherapy was on February 7, 2017.  Patient was doing relatively well until yesterday morning when wife found that patient is more lethargic than usual and had a fever of 101.7, family also noticed patient is having urinary incontinence and uses was dark color as well as foul-smelling.  Patient was brought to the emergency room where the found his temperature was 102.5 and patient was admitted to hospital for further evaluation and workup.  Hematology oncology has been consulted for recommendations regarding his chemotherapy.  Patient denies any seizure-like activity after last chemotherapy.  Denied any excessive nausea or vomiting with last chemotherapy.  Denies any productive cough or diarrhea.    PAST MEDICAL HISTORY:    Past Medical History   Diagnosis Date   • Cortical senile cataract    • Diabetes mellitus    • Hypertension    • Lymphoma of testis 11/2016   • Seizures        PAST SURGICAL HISTORY:  Past Surgical History   Procedure Laterality Date   • Back surgery     • Orchiectomy         ALLERGIES:    Allergies   Allergen Reactions   • Crestor [Rosuvastatin Calcium]    • Levofloxacin    • Penicillins    • Sulfa Antibiotics    • Tramadol        SOCIAL HISTORY:   Social History   Substance Use Topics   • Smoking status: Former Smoker   • Smokeless tobacco: None   • Alcohol use No       CURRENT MEDICATIONS:    Current Facility-Administered Medications   Medication Dose Route Frequency Provider Last Rate Last Dose   • acetaminophen (TYLENOL) tablet 650 mg  650 mg Oral Q6H PRN Kwame Holbrook MD   650 mg  at 02/17/17 1237   • aspirin chewable tablet 81 mg  81 mg Oral Daily Philip Dodson MD   81 mg at 02/17/17 1014   • aspirin tablet 325 mg  325 mg Oral Q6H PRN Conrado Ahumada MD       • atenolol (TENORMIN) tablet 25 mg  25 mg Oral Q12H Philip Dodson MD   25 mg at 02/17/17 1010   • aztreonam (AZACTAM) 2 g/100 mL 0.9% NS (mbp)  2 g Intravenous Q8H Philip Dodson  mL/hr at 02/17/17 0604 2 g at 02/17/17 1354   • doxazosin (CARDURA) tablet 4 mg  4 mg Oral Nightly Philip Dodson MD   4 mg at 02/16/17 2311   • HYDROcodone-acetaminophen (NORCO) 7.5-325 MG per tablet 1 tablet  1 tablet Oral Q4H PRN Philip Dodson MD       • levETIRAcetam (KEPPRA) tablet 500 mg  500 mg Oral BID Philip Dodson MD   500 mg at 02/17/17 1740   • lisinopril (PRINIVIL,ZESTRIL) tablet 10 mg  10 mg Oral Daily Philip Dodson MD   10 mg at 02/17/17 1010   • Pharmacy to dose vancomycin   Does not apply BID Philip Dodson MD       • sodium chloride 0.9 % flush 1-10 mL  1-10 mL Intravenous PRN Philip Dodson MD   10 mL at 02/17/17 0147   • sodium chloride 0.9 % flush 10 mL  10 mL Intravenous PRN Roddy Fernandez MD       • vancomycin (VANCOCIN) 1,500 mg in sodium chloride 0.9 % 500 mL IVPB  1,500 mg Intravenous Q24H Jessie Lazcano MD            HOME MEDICATIONS:   No current facility-administered medications on file prior to encounter.      Current Outpatient Prescriptions on File Prior to Encounter   Medication Sig Dispense Refill   • aspirin 81 MG chewable tablet Chew 81 mg daily.     • atenolol (TENORMIN) 50 MG tablet Take 50 mg by mouth Daily.  3   • doxazosin (CARDURA) 4 MG tablet Take 4 mg by mouth every night.     • fluticasone (FLONASE) 50 MCG/ACT nasal spray 2 sprays into each nostril 2 (Two) Times a Day. Administer 2 sprays in each nostril for each dose.      • HYDROcodone-acetaminophen (NORCO) 7.5-325 MG per tablet Take 1 tablet by mouth Every 6 (Six) Hours As Needed (Takes sparingly).     • levETIRAcetam (KEPPRA) 500 MG tablet  Take 1 tablet by mouth 2 (Two) Times a Day. 28 tablet 0   • lisinopril (PRINIVIL,ZESTRIL) 10 MG tablet Take 20 mg by mouth Daily. Pt now takes 2 (10 mg) tabs = 20 mg  2   • prochlorperazine (COMPAZINE) 10 MG tablet Take 1 tablet by mouth Every 6 (Six) Hours As Needed for nausea or vomiting. 40 tablet 3   • aspirin 325 MG tablet Take 325 mg by mouth As Needed (Takes as needed when ASA 81 mg does not control his vertigo (per pt)).         FAMILY HISTORY:    Family History   Problem Relation Age of Onset   • Diabetes Mother    • Hypertension Sister    • Cancer Sister    • Hypertension Brother    • Cancer Brother        REVIEW OF SYSTEMS:      CONSTITUTIONAL:  Complains of fatigue. Denies any fever, chills or weight loss.     HEENT:  No epistaxis, mouth sores or difficulty swallowing.    RESPIRATORY:  No new shortness of breath. No new cough or hemoptysis.    CARDIOVASCULAR:  No chest pain or palpitations.    GASTROINTESTINAL:  No abdominal pain nausea, vomiting or blood in the stool.    GENITOURINARY: Had foul-smelling urine prior to hospital admission.  Denies any burning or discharge at present.    MUSCULOSKELETAL:  No new back pain or arthralgia..    LYMPHATICS:  Denies any abnormal swollen glands anywhere in the body.    NEUROLOGICAL : Complains of dizziness.  Requesting aspirin 325 mg for that.  No tingling or numbness. No headache . No seizures or balance problems.    SKIN: No new skin lesions.    PHYSICAL EXAMINATION:      VITAL SIGNS:  Temp:  [96.9 °F (36.1 °C)-100.7 °F (38.2 °C)] 98.5 °F (36.9 °C)  Heart Rate:  [62-76] 64  Resp:  [16-18] 16  BP: (138-169)/(64-84) 140/65    GENERAL:  Not in any distress.    HEENT:  Normocephalic, Atraumatic.Eyes  Shows mild pallor. No icterus. Extraocular Movements Intact. No Fascial Asymmetry noted.    NECK:  No adenopathy. NO JVD.    RESPIRATORY:  Fair air entry bilateral. No rhonchi or wheezing.    CARDIOVASCULAR:  S1, S2. Regular rate and rhythm. No murmur or gallop  appreciated.    ABDOMEN:  Soft, obese, nontender. Bowel sounds present in all four quadrants.  No organomegaly appreciated.    EXTREMITIES:  No edema.No Calf Tenderness.    NEUROLOGIC:  Alert, awake and oriented ×3.  No  Motor or sensory deficit appreciated. Cranial Nerves 2-12 grossly intact.        DIAGNOSTIC DATA:    WBC   Date Value Ref Range Status   02/17/2017 7.73 3.20 - 9.80 10*3/mm3 Final     RBC   Date Value Ref Range Status   02/17/2017 3.68 (L) 4.37 - 5.74 10*6/mm3 Final     HEMOGLOBIN   Date Value Ref Range Status   02/17/2017 10.3 (L) 13.7 - 17.3 g/dL Final     HEMATOCRIT   Date Value Ref Range Status   02/17/2017 30.8 (L) 39.0 - 49.0 % Final     MCV   Date Value Ref Range Status   02/17/2017 83.7 80.0 - 98.0 fL Final     MCH   Date Value Ref Range Status   02/17/2017 28.0 26.5 - 34.0 pg Final     MCHC   Date Value Ref Range Status   02/17/2017 33.4 31.5 - 36.3 g/dL Final     RDW   Date Value Ref Range Status   02/17/2017 13.6 11.5 - 14.5 % Final     RDW-SD   Date Value Ref Range Status   02/17/2017 41.1 35.1 - 43.9 fl Final     MPV   Date Value Ref Range Status   02/17/2017 10.9 8.0 - 12.0 fL Final     PLATELETS   Date Value Ref Range Status   02/17/2017 140 (L) 150 - 450 10*3/mm3 Final     NEUTROPHIL %   Date Value Ref Range Status   02/16/2017 70.0 37.0 - 80.0 % Final     LYMPHOCYTE %   Date Value Ref Range Status   02/16/2017 8.5 (L) 10.0 - 50.0 % Final     MONOCYTE %   Date Value Ref Range Status   02/16/2017 13.4 (H) 0.0 - 12.0 % Final     EOSINOPHIL %   Date Value Ref Range Status   02/16/2017 2.0 0.0 - 7.0 % Final     BASOPHIL %   Date Value Ref Range Status   02/16/2017 1.5 0.0 - 2.0 % Final     IMMATURE GRANS %   Date Value Ref Range Status   02/16/2017 4.6 (H) 0.0 - 0.5 % Final     NEUTROPHILS, ABSOLUTE   Date Value Ref Range Status   02/16/2017 3.80 2.00 - 8.60 10*3/mm3 Final     NEUTROPHILS ABSOLUTE   Date Value Ref Range Status   02/17/2017 6.65 2.00 - 8.60 10*3/mm3 Final     LYMPHOCYTES,  ABSOLUTE   Date Value Ref Range Status   02/16/2017 0.46 (L) 0.60 - 4.20 10*3/mm3 Final     MONOCYTES, ABSOLUTE   Date Value Ref Range Status   02/16/2017 0.73 0.00 - 0.90 10*3/mm3 Final     EOSINOPHILS, ABSOLUTE   Date Value Ref Range Status   02/16/2017 0.11 0.00 - 0.70 10*3/mm3 Final     EOSINOPHILS ABSOLUTE   Date Value Ref Range Status   02/17/2017 0.08 0.00 - 0.70 10*3/mm3 Final     BASOPHILS, ABSOLUTE   Date Value Ref Range Status   02/16/2017 0.08 0.00 - 0.20 10*3/mm3 Final     IMMATURE GRANS, ABSOLUTE   Date Value Ref Range Status   02/16/2017 0.25 (H) 0.00 - 0.02 10*3/mm3 Final     GLUCOSE   Date Value Ref Range Status   02/17/2017 97 60 - 100 mg/dL Final     SODIUM   Date Value Ref Range Status   02/17/2017 135 (L) 137 - 145 mmol/L Final     POTASSIUM   Date Value Ref Range Status   02/17/2017 3.9 3.5 - 5.1 mmol/L Final     CO2   Date Value Ref Range Status   02/17/2017 29.0 22.0 - 31.0 mmol/L Final     CHLORIDE   Date Value Ref Range Status   02/17/2017 103 95 - 110 mmol/L Final     ANION GAP   Date Value Ref Range Status   02/17/2017 3.0 (L) 5.0 - 15.0 mmol/L Final     CREATININE   Date Value Ref Range Status   02/17/2017 0.83 0.70 - 1.30 mg/dL Final     BUN   Date Value Ref Range Status   02/17/2017 10 7 - 21 mg/dL Final     BUN/CREATININE RATIO   Date Value Ref Range Status   02/17/2017 12.0 7.0 - 25.0 Final     CALCIUM   Date Value Ref Range Status   02/17/2017 8.1 (L) 8.4 - 10.2 mg/dL Final     EGFR NON  AMER   Date Value Ref Range Status   02/17/2017 90 >60 mL/min/1.73 Final     ALKALINE PHOSPHATASE   Date Value Ref Range Status   02/17/2017 106 38 - 126 U/L Final     TOTAL PROTEIN   Date Value Ref Range Status   02/17/2017 5.2 (L) 6.3 - 8.6 g/dL Final     ALT (SGPT)   Date Value Ref Range Status   02/17/2017 28 21 - 72 U/L Final     AST (SGOT)   Date Value Ref Range Status   02/17/2017 18 17 - 59 U/L Final     TOTAL BILIRUBIN   Date Value Ref Range Status   02/17/2017 0.4 0.2 - 1.3 mg/dL  Final     ALBUMIN   Date Value Ref Range Status   02/17/2017 2.70 (L) 3.40 - 4.80 g/dL Final     GLOBULIN   Date Value Ref Range Status   02/17/2017 2.5 2.3 - 3.5 gm/dL Final     A/G RATIO   Date Value Ref Range Status   02/17/2017 1.1 1.1 - 1.8 g/dL Final     No results found for: IRON, TIBC, LABIRON, FERRITIN, [id number], FOLATE  Lab Results   Component Value Date    AFPTM 2.7 12/30/2016    HCGQUANT 1 12/30/2016     Urine analysis done on February 16, 2017 shows 3+ leukocyte esterase and positive nitrites.  Urine culture is still in progress.    Blood culture is negative for 24 hours.    Radiology Data :  Chest x-ray done in the emergency room on February 16, 2017 showed:  IMPRESSION:  No radiographic evidence of acute cardiopulmonary  disease.        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 Diagnosed in November 2016. Patient had a PET/CT done which was negative for any abnormal activity. All patient could not have a complete staging workup with a lumbar puncture ,CSF analysis secondary to being on aspirin. Patient also did not get intrathecal chemotherapy for the same reason being on aspirin.  Patient is status post 2 cycles of chemotherapy with R-CHOP last of which was on February 7, 2017.  We will follow up as outpatient upon hospital discharge.    2.  Sepsis most likely secondary to urinary source at this point.  Urine analysis shows 3+ leukocyte esterase and positive for nitrites as well.  As per family his urine was foul smelling 4 a day before his presentation to the hospital.  At this point patient remains on vancomycin and aztreonam.  Continue management as per medical team.    3.  Mild anemia and thrombocytopenia: Most likely secondary to chemotherapy plus or minus sepsis.  Recommend transfusing PRBC if hemoglobin is less than 7.5 her platelet is less than 50,000 or if patient is any active bleeding.    4.  History of 2 episode of seizure 3 month apart or on patient was  recently seen by Dr. Hill.  His schedule for MRI of the brain for further evaluation and workup of recurrent seizures.    5.  History of sebaceous carcinoma of left upper eyelid, status post surgery and radiation      Thank you for this consultation.    Conrado Ahumada MD  2/17/2017  5:46 PM

## 2017-02-17 NOTE — ED NOTES
Report given to Tereza MOLINA who states no additional questions at this time.  Resps even and unlabored.  NAD.  VSS.  Pt.ready to go to floor at this time.      Zonia Love RN  02/16/17 2020

## 2017-02-17 NOTE — PROGRESS NOTES
"Pharmacokinetics by Pharmacy - Vancomycin    Sharan Steiner is a 76 y.o. male   [Ht: 66\" (167.6 cm); Wt: 192 lb 6.4 oz (87.3 kg)]    Estimated Creatinine Clearance: 77.2 mL/min (by C-G formula based on Cr of 0.83).   Lab Results   Component Value Date    CREATININE 0.83 02/17/2017    CREATININE 0.87 02/16/2017    CREATININE 0.94 02/16/2017    CREATININE 0.9 01/17/2017    CREATININE 1.0 01/06/2017    CREATININE 0.9 11/10/2016      Lab Results   Component Value Date    WBC 7.73 02/17/2017    WBC 5.43 02/16/2017    WBC 5.00 02/16/2017      Temp Readings from Last 1 Encounters:   02/17/17 100.3 °F (37.9 °C) (Axillary)       Indication for use: Sepsis     Baseline culture results:  Microbiology Results (last 10 days)       Procedure Component Value - Date/Time      Influenza Antigen [[id number]]  (Normal) Collected:  02/16/17 2330    Lab Status:  Final result Specimen:  Swab from Nasopharynx Updated:  02/17/17 0032     Influenza A Ag, EIA Negative      Influenza B Ag, EIA Negative     Urine Culture [[id number]]  (Normal) Collected:  02/16/17 1842    Lab Status:  Preliminary result Specimen:  Urine from Urine, Clean Catch Updated:  02/17/17 0723     Urine Culture Culture in progress     Blood Culture [[id number]]  (Normal) Collected:  02/16/17 1816    Lab Status:  Preliminary result Specimen:  Blood from Hand, Left Updated:  02/17/17 0701     Blood Culture No growth at less than 24 hours     Blood Culture [[id number]]  (Normal) Collected:  02/16/17 1716    Lab Status:  Preliminary result Specimen:  Blood from Arm, Left Updated:  02/17/17 0601     Blood Culture No growth at less than 24 hours                Assessment/Plan  Received vancomycin 1750 mg IV in ED on 2/16 at 2107.  Will initiated Vancomycin 1500 mg IVPB Q24H. Will order Vancomycin trough level on 2/20/17 at 2030.  Pharmacy will monitor renal function and adjust dose accordingly.    Jennifer Mejia RPH  02/17/17 12:47 PM    "

## 2017-02-17 NOTE — PROGRESS NOTES
[facility] Medicine Services  INPATIENT PROGRESS NOTE    Length of Stay: 1  Date of Admission: 2/16/2017  Primary Care Physician: Mary Ellen Zhou DO    Subjective   Chief Complaint: fever  HPI:  76 year old  male with history of B cell lymphoma who presented yesterday with fevers, weakness, anorexia after his second round of chemotherapy.  He was noted to have UTI in the ED and is currently receiving IV antibiotic therapy.  Urine and blood cultures are pending.       Review of Systems   Constitutional: Positive for appetite change, chills, fatigue and fever.   HENT: Negative for congestion, postnasal drip, rhinorrhea, sinus pressure and sore throat.    Respiratory: Negative for cough, choking, shortness of breath and wheezing.    Cardiovascular: Negative for chest pain, palpitations and leg swelling.   Gastrointestinal: Negative for abdominal pain, constipation, diarrhea, nausea and vomiting.   Genitourinary: Positive for urgency. Negative for difficulty urinating, dysuria and hematuria.   Musculoskeletal: Negative for back pain, myalgias and neck pain.   Skin: Negative for color change, pallor, rash and wound.   Neurological: Negative for dizziness, syncope, weakness, light-headedness and headaches.   Psychiatric/Behavioral: Negative for confusion.        All pertinent negatives and positives are as above. All other systems have been reviewed and are negative unless otherwise stated.     Objective    Temp:  [96.9 °F (36.1 °C)-102.4 °F (39.1 °C)] 100.3 °F (37.9 °C)  Heart Rate:  [60-78] 68  Resp:  [16-21] 16  BP: (116-169)/(64-84) 152/67    Physical Exam   Constitutional: He is oriented to person, place, and time. He appears well-developed and well-nourished.   HENT:   Head: Normocephalic and atraumatic.   Eyes: EOM are normal. Pupils are equal, round, and reactive to light.   Neck: Normal range of motion. Neck supple. No tracheal deviation present. No thyromegaly  present.   Cardiovascular: Normal rate, regular rhythm, normal heart sounds and intact distal pulses.  Exam reveals no gallop and no friction rub.    No murmur heard.  Pulmonary/Chest: Effort normal and breath sounds normal. No respiratory distress. He has no wheezes. He has no rales. He exhibits no tenderness.   Abdominal: Soft. Bowel sounds are normal. He exhibits no distension. There is no tenderness.   Musculoskeletal: Normal range of motion. He exhibits no edema or deformity.   Neurological: He is alert and oriented to person, place, and time.   Skin: Skin is warm and dry. No rash noted. No erythema. No pallor.   Psychiatric: He has a normal mood and affect. His behavior is normal. Judgment and thought content normal.           Results Review:  I have reviewed the labs, radiology results, and diagnostic studies.    Laboratory Data:     Results from last 7 days  Lab Units 02/17/17  0609 02/16/17 2119 02/16/17  1716   SODIUM mmol/L 135* 134* 130*   POTASSIUM mmol/L 3.9 4.2 4.1   CHLORIDE mmol/L 103 100 96   TOTAL CO2 mmol/L 29.0 25.0 28.0   BUN mg/dL 10 11 11   CREATININE mg/dL 0.83 0.87 0.94   GLUCOSE mg/dL 97 97 96   CALCIUM mg/dL 8.1* 8.1* 8.5   BILIRUBIN mg/dL 0.4 0.6 0.5   ALK PHOS U/L 106 105 112   ALT (SGPT) U/L 28 29 28   AST (SGOT) U/L 18 21 19   ANION GAP mmol/L 3.0* 9.0 6.0     Estimated Creatinine Clearance: 77.2 mL/min (by C-G formula based on Cr of 0.83).            Results from last 7 days  Lab Units 02/17/17  0609 02/16/17 2119 02/16/17  1716   WBC 10*3/mm3 7.73 5.43 5.00   HEMOGLOBIN g/dL 10.3* 11.1* 10.8*   HEMATOCRIT % 30.8* 33.2* 31.6*   PLATELETS 10*3/mm3 140* 175 169           Culture Data:   BLOOD CULTURE   Date Value Ref Range Status   02/16/2017 No growth at less than 24 hours  Preliminary   02/16/2017 No growth at less than 24 hours  Preliminary     URINE CULTURE   Date Value Ref Range Status   02/16/2017 Culture in progress  Preliminary     No results found for: RESPCX  No results  found for: WOUNDCX  No results found for: STOOLCX  No components found for: BODYFLD    Radiology Data:   Imaging Results (last 24 hours)     Procedure Component Value Units Date/Time    XR Chest 2 View [[id number]] Collected:  02/16/17 1803     Updated:  02/16/17 1810    Narrative:       Patient Name:  LEE STANFORDPatient ID:  [id number]  Ordering:  ELKIN Echavarriaending:  ELKIN TOMLINReferring:  ELKIN TOMLIN------------------------------------------------PROCEDURE:  XR CHEST 2 VIEWS    INDICATION FOR PROCEDURE:  76 years -old patient presents for  evaluation of fever.    COMPARISON:  January 2, 2017.    FINDINGS: XR CHEST two views  reveals the lungs are expanded.  Patient has a right-sided Mediport catheter. The catheter is in  the right inferior vena cava. The tip the catheter is in the  superior vena cava. Cardiac monitoring leads are present.    The thoracic aorta is tortuous with atherosclerotic  calcification. There is mild elevation and eventration of the  right hemidiaphragm. Cardiac silhouette is at the limits of  normal.    There is no radiographic evidence for airspace consolidation,  pleural effusion or pneumothorax. Mediastinal silhouette is  within normal limits. The skeletal structures are within normal  limits.       Impression:       No radiographic evidence of acute cardiopulmonary  disease.    Electronically signed by:  Radha Galeano MD  2/16/2017 6:09 PM CST  Workstation: [email or web address]          I have reviewed the patient current medications.     Assessment/Plan     Hospital Problem List     DLBCL (diffuse large B cell lymphoma)    Sepsis    Fever          Plan:   1. Sepsis r/t UTI: Urine and blood cultures pending, IV fluids, Vancomycin, Aztreonam.  WBC improved, lactic acid normal.    2. Acute cystitis: cultures pending, Vancomycin, Aztreonam  3. Diffuse large B cell lymphoma: Oncology following.   4. Fever: resolved.   5. HTN: stable.      Discharge Planning: I expect  patient to be discharged to home in 2 days.    Rosey Sr, OSEI   02/17/17   2:34 PM

## 2017-02-17 NOTE — PLAN OF CARE
Problem: Infection, Risk/Actual (Adult)  Goal: Infection Prevention/Resolution  Outcome: Outcome(s) achieved Date Met:  02/17/17

## 2017-02-17 NOTE — H&P
History and Physical  Philip Dodson MD  Hospitalist      Patient Care Team:  Mary Ellen Zhou DO as PCP - General  Paulino Candelario MD as Consulting Physician (Radiation Oncology)  Conrado Ahumada MD as Consulting Physician (Hematology and Oncology)    Chief complaint   Chief Complaint   Patient presents with   • Fever       Subjective     Patient is a 76 y.o. male admitted with fever, weakness, poor appetite several days after his 2nd round of chemotherapy. He has felt progressively worse.      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   • Nuclear cataract    • Seizures      Past Surgical History   Procedure Laterality Date   • Back surgery     • Orchiectomy       Family History   Problem Relation Age of Onset   • Diabetes Mother    • Hypertension Sister    • Cancer Sister    • Hypertension Brother    • Cancer Brother      Social History   Substance Use Topics   • Smoking status: Former Smoker   • Smokeless tobacco: None   • Alcohol use No       (Not in a hospital admission)  Allergies:  Crestor [rosuvastatin calcium]; Levofloxacin; Penicillins; Sulfa antibiotics; and Tramadol    Review of Systems  Review of Systems   Constitutional: Positive for chills, fatigue and fever.   HENT: Positive for congestion.    Respiratory: Positive for cough. Negative for chest tightness, shortness of breath and wheezing.    Cardiovascular: Positive for chest pain and leg swelling.   Musculoskeletal: Positive for back pain, myalgias and neck pain.   Neurological: Positive for weakness and light-headedness. Negative for seizures and syncope.   Psychiatric/Behavioral: Negative for agitation.   All other systems reviewed and are negative.      Objective     Vital Signs  Temp:  [99.7 °F (37.6 °C)-102.4 °F (39.1 °C)] 99.7 °F (37.6 °C)  Heart Rate:  [60-78] 62  Resp:  [18-21] 18  BP: (116-146)/(65-67) 146/65    Physical Exam:  Physical Exam    Constitutional: He is oriented to person, place, and time. He appears well-developed and well-nourished.   HENT:   Head: Normocephalic and atraumatic.   Eyes: EOM are normal.   Neck: Neck supple.   Cardiovascular: Normal rate and regular rhythm.    Pulmonary/Chest: Effort normal. He has wheezes. He exhibits no tenderness.   Musculoskeletal: He exhibits no edema or deformity.   Neurological: He is alert and oriented to person, place, and time.   Skin: Skin is warm and dry.   Psychiatric: He has a normal mood and affect. His behavior is normal.   Vitals reviewed.      Results Review:   Lab Results (last 24 hours)     Procedure Component Value Units Date/Time    Blood Culture [[id number]] Collected:  02/16/17 1716    Specimen:  Blood from Arm, Left Updated:  02/16/17 1722    [facility] Draw [[id number]] Collected:  02/16/17 1716    Specimen:  Blood Updated:  02/16/17 1722    Narrative:       The following orders were created for panel order [facility] Draw.  Procedure                               Abnormality         Status                     ---------                               -----------         ------                     Light Blue Top[[id number]]                                    In process                 Green Top (Gel)[[id number]]                                   In process                 Lavender Top[[id number]]                                      In process                 Gold Top - SST[[id number]]                                    In process                   Please view results for these tests on the individual orders.    Green Top (Gel) [[id number]] Collected:  02/16/17 1716    Specimen:  Blood Updated:  02/16/17 1722    Light Blue Top [[id number]] Collected:  02/16/17 1716    Specimen:  Blood Updated:  02/16/17 1722    Gold Top - SST [[id number]] Collected:  02/16/17 1716    Specimen:  Blood Updated:  02/16/17 1722    Lavender Top [[id number]] Collected:  02/16/17 1716    Specimen:  Blood Updated:  02/16/17 1722     Scan Slide [[id number]] Collected:  02/16/17 1716    Specimen:  Blood Updated:  02/16/17 1729    CBC & Differential [[id number]] Collected:  02/16/17 1716    Specimen:  Blood Updated:  02/16/17 1730    Narrative:       The following orders were created for panel order CBC & Differential.  Procedure                               Abnormality         Status                     ---------                               -----------         ------                     Scan Slide[[id number]]                                        In process                 CBC Auto Differential[[id number]]         Abnormal            Final result                 Please view results for these tests on the individual orders.    CBC Auto Differential [[id number]]  (Abnormal) Collected:  02/16/17 1716    Specimen:  Blood Updated:  02/16/17 1730     WBC 5.00 10*3/mm3      RBC 3.83 (L) 10*6/mm3      Hemoglobin 10.8 (L) g/dL      Hematocrit 31.6 (L) %      MCV 82.5 fL      MCH 28.2 pg      MCHC 34.2 g/dL      RDW 13.4 %      RDW-SD 40.4 fl      MPV 10.6 fL      Platelets 169 10*3/mm3      Neutrophil % 74.6 %      Lymphocyte % 7.2 (L) %      Monocyte % 13.6 (H) %      Eosinophil % 2.4 %      Basophil % 0.8 %      Immature Grans % 1.4 (H) %      Neutrophils, Absolute 3.73 10*3/mm3      Lymphocytes, Absolute 0.36 (L) 10*3/mm3      Monocytes, Absolute 0.68 10*3/mm3      Eosinophils, Absolute 0.12 10*3/mm3      Basophils, Absolute 0.04 10*3/mm3      Immature Grans, Absolute 0.07 (H) 10*3/mm3     Lactic Acid, Plasma [[id number]]  (Normal) Collected:  02/16/17 1716    Specimen:  Blood Updated:  02/16/17 1735     Lactate 0.9 mmol/L     Comprehensive Metabolic Panel [[id number]]  (Abnormal) Collected:  02/16/17 1716    Specimen:  Blood Updated:  02/16/17 1738     Glucose 96 mg/dL      BUN 11 mg/dL      Creatinine 0.94 mg/dL      Sodium 130 (L) mmol/L      Potassium 4.1 mmol/L      Chloride 96 mmol/L      CO2 28.0 mmol/L      Calcium 8.5 mg/dL      Total Protein 5.7 (L)  g/dL      Albumin 3.20 (L) g/dL      ALT (SGPT) 28 U/L      AST (SGOT) 19 U/L      Alkaline Phosphatase 112 U/L      Total Bilirubin 0.5 mg/dL      eGFR Non African Amer 78 mL/min/1.73      Globulin 2.5 gm/dL      A/G Ratio 1.3 g/dL      BUN/Creatinine Ratio 11.7      Anion Gap 6.0 mmol/L     Narrative:       The MDRD GFR formula is only valid for adults with stable renal function between ages 18 and 70.    Blood Culture [[id number]] Collected:  02/16/17 1816    Specimen:  Blood from Hand, Left Updated:  02/16/17 1843    Urine Culture [[id number]] Collected:  02/16/17 1842    Specimen:  Urine from Urine, Clean Catch Updated:  02/16/17 1849    Urinalysis With / Culture If Indicated [[id number]]  (Abnormal) Collected:  02/16/17 1842    Specimen:  Urine from Urine, Clean Catch Updated:  02/16/17 1932     Color, UA Dark Yellow      Appearance, UA Turbid (A)      pH, UA 6.0      Specific Gravity, UA 1.016      Glucose, UA Negative      Ketones, UA Negative      Bilirubin, UA Negative      Blood, UA Large (3+) (A)      Protein,  mg/dL (2+) (A)      Leuk Esterase, UA Large (3+) (A)      Nitrite, UA Positive (A)      Urobilinogen, UA 0.2 E.U./dL     Urinalysis, Microscopic Only [[id number]]  (Abnormal) Collected:  02/16/17 1842    Specimen:  Urine from Urine, Clean Catch Updated:  02/16/17 1933     RBC, UA Too Numerous to Count (A) /HPF      WBC, UA Too Numerous to Count (A) /HPF      Bacteria, UA 4+ (A) /HPF      Squamous Epithelial Cells, UA  /HPF      Unable to determine due to loaded field (A)     Hyaline Casts, UA  /LPF      Unable to determine due to loaded field     Methodology Manual Light Microscopy           Imaging Results (last 24 hours)     Procedure Component Value Units Date/Time    XR Chest 2 View [[id number]] Collected:  02/16/17 1803     Updated:  02/16/17 1810    Narrative:       Patient Name:  LEE STANFORDPatient ID:  [id number]  Ordering:  ELKIN Echavarriaending:  ELKIN TOMLINReferring:   ELKIN TOMLIN------------------------------------------------PROCEDURE:  XR CHEST 2 VIEWS    INDICATION FOR PROCEDURE:  76 years -old patient presents for  evaluation of fever.    COMPARISON:  January 2, 2017.    FINDINGS: XR CHEST two views  reveals the lungs are expanded.  Patient has a right-sided Mediport catheter. The catheter is in  the right inferior vena cava. The tip the catheter is in the  superior vena cava. Cardiac monitoring leads are present.    The thoracic aorta is tortuous with atherosclerotic  calcification. There is mild elevation and eventration of the  right hemidiaphragm. Cardiac silhouette is at the limits of  normal.    There is no radiographic evidence for airspace consolidation,  pleural effusion or pneumothorax. Mediastinal silhouette is  within normal limits. The skeletal structures are within normal  limits.       Impression:       No radiographic evidence of acute cardiopulmonary  disease.    Electronically signed by:  Radha Galeano MD  2/16/2017 6:09 PM [facility]  Workstation: [email or web address]          Assessment/Plan     Active Problems:    Fever - post chemotherapy    Possible Sepsis / UTI     DLBCL (diffuse large B cell lymphoma)    Start broad spectrum antibiotics, follow up BC, CBC, UC.    Philip Dodson MD  02/16/17  7:44 PM

## 2017-02-17 NOTE — PLAN OF CARE
Problem: Patient Care Overview (Adult)  Goal: Plan of Care Review  Outcome: Ongoing (interventions implemented as appropriate)    02/17/17 0457   Coping/Psychosocial Response Interventions   Plan Of Care Reviewed With patient   Patient Care Overview   Progress improving   Outcome Evaluation   Outcome Summary/Follow up Plan Pt reports dark urine on arrival to ER. Urine now yellow, clear. Pt febrile during shift, given tylenol. Fever resolved. Pt restted well during night.       Goal: Adult Individualization and Mutuality  Outcome: Ongoing (interventions implemented as appropriate)    Problem: Infection, Risk/Actual (Adult)  Goal: Identify Related Risk Factors and Signs and Symptoms  Outcome: Outcome(s) achieved Date Met:  02/17/17  Goal: Infection Prevention/Resolution  Outcome: Ongoing (interventions implemented as appropriate)    Problem: Fall Risk (Adult)  Goal: Identify Related Risk Factors and Signs and Symptoms  Outcome: Ongoing (interventions implemented as appropriate)  Goal: Absence of Falls  Outcome: Ongoing (interventions implemented as appropriate)    Problem: Chemotherapy Effects (Adult)  Goal: Signs and Symptoms of Listed Potential Problems Will be Absent or Manageable (Chemotherapy Effects)  Outcome: Ongoing (interventions implemented as appropriate)

## 2017-02-18 LAB
ANION GAP SERPL CALCULATED.3IONS-SCNC: 7 MMOL/L (ref 5–15)
ANISOCYTOSIS BLD QL: ABNORMAL
BASOPHILS # BLD MANUAL: 0.17 10*3/MM3 (ref 0–0.2)
BASOPHILS NFR BLD AUTO: 2 % (ref 0–2)
BUN BLD-MCNC: 11 MG/DL (ref 7–21)
BUN/CREAT SERPL: 11.3 (ref 7–25)
CALCIUM SPEC-SCNC: 8.2 MG/DL (ref 8.4–10.2)
CHLORIDE SERPL-SCNC: 103 MMOL/L (ref 95–110)
CO2 SERPL-SCNC: 27 MMOL/L (ref 22–31)
CREAT BLD-MCNC: 0.97 MG/DL (ref 0.7–1.3)
CRP SERPL-MCNC: 22.2 MG/DL (ref 0–1)
D-LACTATE SERPL-SCNC: 1 MMOL/L (ref 0.5–2)
DEPRECATED RDW RBC AUTO: 41 FL (ref 35.1–43.9)
EOSINOPHIL # BLD MANUAL: 0.09 10*3/MM3 (ref 0–0.7)
EOSINOPHIL NFR BLD MANUAL: 1 % (ref 0–7)
ERYTHROCYTE [DISTWIDTH] IN BLOOD BY AUTOMATED COUNT: 13.5 % (ref 11.5–14.5)
GFR SERPL CREATININE-BSD FRML MDRD: 75 ML/MIN/1.73 (ref 42–98)
GLUCOSE BLD-MCNC: 99 MG/DL (ref 60–100)
HCT VFR BLD AUTO: 29.7 % (ref 39–49)
HGB BLD-MCNC: 10.4 G/DL (ref 13.7–17.3)
LYMPHOCYTES # BLD MANUAL: 0.52 10*3/MM3 (ref 0.6–4.2)
LYMPHOCYTES NFR BLD MANUAL: 10 % (ref 0–12)
LYMPHOCYTES NFR BLD MANUAL: 6 % (ref 10–50)
MCH RBC QN AUTO: 29 PG (ref 26.5–34)
MCHC RBC AUTO-ENTMCNC: 35 G/DL (ref 31.5–36.3)
MCV RBC AUTO: 82.7 FL (ref 80–98)
METAMYELOCYTES NFR BLD MANUAL: 3 % (ref 0–0)
MONOCYTES # BLD AUTO: 0.87 10*3/MM3 (ref 0–0.9)
NEUTROPHILS # BLD AUTO: 6.8 10*3/MM3 (ref 2–8.6)
NEUTROPHILS NFR BLD MANUAL: 70 % (ref 37–80)
NEUTS BAND NFR BLD MANUAL: 8 % (ref 0–5)
PLATELET # BLD AUTO: 112 10*3/MM3 (ref 150–450)
PMV BLD AUTO: 10.2 FL (ref 8–12)
POTASSIUM BLD-SCNC: 3.7 MMOL/L (ref 3.5–5.1)
RBC # BLD AUTO: 3.59 10*6/MM3 (ref 4.37–5.74)
SMALL PLATELETS BLD QL SMEAR: ABNORMAL
SODIUM BLD-SCNC: 137 MMOL/L (ref 137–145)
TOXIC GRANULATION: ABNORMAL
WBC NRBC COR # BLD: 8.72 10*3/MM3 (ref 3.2–9.8)

## 2017-02-18 PROCEDURE — 85007 BL SMEAR W/DIFF WBC COUNT: CPT | Performed by: NURSE PRACTITIONER

## 2017-02-18 PROCEDURE — 80048 BASIC METABOLIC PNL TOTAL CA: CPT | Performed by: NURSE PRACTITIONER

## 2017-02-18 PROCEDURE — 85025 COMPLETE CBC W/AUTO DIFF WBC: CPT | Performed by: NURSE PRACTITIONER

## 2017-02-18 PROCEDURE — 86140 C-REACTIVE PROTEIN: CPT | Performed by: NURSE PRACTITIONER

## 2017-02-18 PROCEDURE — 25010000002 VANCOMYCIN PER 500 MG: Performed by: INTERNAL MEDICINE

## 2017-02-18 PROCEDURE — 99233 SBSQ HOSP IP/OBS HIGH 50: CPT | Performed by: INTERNAL MEDICINE

## 2017-02-18 PROCEDURE — 83605 ASSAY OF LACTIC ACID: CPT | Performed by: NURSE PRACTITIONER

## 2017-02-18 RX ORDER — SODIUM CHLORIDE 9 MG/ML
75 INJECTION, SOLUTION INTRAVENOUS CONTINUOUS
Status: DISCONTINUED | OUTPATIENT
Start: 2017-02-18 | End: 2017-02-20 | Stop reason: HOSPADM

## 2017-02-18 RX ADMIN — AZTREONAM 2 G: 2 INJECTION, POWDER, FOR SOLUTION INTRAMUSCULAR; INTRAVENOUS at 06:15

## 2017-02-18 RX ADMIN — AZTREONAM 2 G: 2 INJECTION, POWDER, FOR SOLUTION INTRAMUSCULAR; INTRAVENOUS at 14:03

## 2017-02-18 RX ADMIN — ACETAMINOPHEN 650 MG: 325 TABLET ORAL at 03:39

## 2017-02-18 RX ADMIN — ASPIRIN 81 MG 81 MG: 81 TABLET ORAL at 10:21

## 2017-02-18 RX ADMIN — AZTREONAM 2 G: 2 INJECTION, POWDER, FOR SOLUTION INTRAMUSCULAR; INTRAVENOUS at 23:11

## 2017-02-18 RX ADMIN — VANCOMYCIN HYDROCHLORIDE 1500 MG: 1 INJECTION, POWDER, LYOPHILIZED, FOR SOLUTION INTRAVENOUS at 21:00

## 2017-02-18 RX ADMIN — SODIUM CHLORIDE 75 ML/HR: 9 INJECTION, SOLUTION INTRAVENOUS at 15:28

## 2017-02-18 RX ADMIN — ATENOLOL 25 MG: 25 TABLET ORAL at 09:13

## 2017-02-18 RX ADMIN — LEVETIRACETAM 500 MG: 500 TABLET, FILM COATED ORAL at 09:13

## 2017-02-18 RX ADMIN — ACETAMINOPHEN 650 MG: 325 TABLET ORAL at 14:03

## 2017-02-18 RX ADMIN — LISINOPRIL 10 MG: 10 TABLET ORAL at 09:13

## 2017-02-18 RX ADMIN — DOXAZOSIN 4 MG: 2 TABLET ORAL at 21:43

## 2017-02-18 RX ADMIN — ATENOLOL 25 MG: 25 TABLET ORAL at 21:43

## 2017-02-18 RX ADMIN — LEVETIRACETAM 500 MG: 500 TABLET, FILM COATED ORAL at 17:50

## 2017-02-18 NOTE — PLAN OF CARE
Problem: Patient Care Overview (Adult)  Goal: Plan of Care Review  Outcome: Ongoing (interventions implemented as appropriate)    02/18/17 0335   Coping/Psychosocial Response Interventions   Plan Of Care Reviewed With patient   Patient Care Overview   Progress progress toward functional goals as expected       Goal: Adult Individualization and Mutuality  Outcome: Ongoing (interventions implemented as appropriate)  Goal: Discharge Needs Assessment  Outcome: Ongoing (interventions implemented as appropriate)    Problem: Fall Risk (Adult)  Goal: Identify Related Risk Factors and Signs and Symptoms  Outcome: Ongoing (interventions implemented as appropriate)  Goal: Absence of Falls  Outcome: Ongoing (interventions implemented as appropriate)    Problem: Chemotherapy Effects (Adult)  Goal: Signs and Symptoms of Listed Potential Problems Will be Absent or Manageable (Chemotherapy Effects)  Outcome: Ongoing (interventions implemented as appropriate)

## 2017-02-18 NOTE — PLAN OF CARE
Problem: Patient Care Overview (Adult)  Goal: Plan of Care Review  Outcome: Ongoing (interventions implemented as appropriate)    02/18/17 8896   Coping/Psychosocial Response Interventions   Plan Of Care Reviewed With patient;family   Patient Care Overview   Progress progress towards functional goals is fair   Outcome Evaluation   Outcome Summary/Follow up Plan Fever spiked today to 103 degrees. Tylenol given and brought down to 101.7. APRN states to monitor pt closely.        Goal: Adult Individualization and Mutuality  Outcome: Ongoing (interventions implemented as appropriate)  Goal: Discharge Needs Assessment  Outcome: Ongoing (interventions implemented as appropriate)    Problem: Fall Risk (Adult)  Goal: Identify Related Risk Factors and Signs and Symptoms  Outcome: Outcome(s) achieved Date Met:  02/18/17  Goal: Absence of Falls  Outcome: Ongoing (interventions implemented as appropriate)    Problem: Chemotherapy Effects (Adult)  Goal: Signs and Symptoms of Listed Potential Problems Will be Absent or Manageable (Chemotherapy Effects)  Outcome: Ongoing (interventions implemented as appropriate)

## 2017-02-18 NOTE — PROGRESS NOTES
[facility] Medicine Services  INPATIENT PROGRESS NOTE    Length of Stay: 2  Date of Admission: 2/16/2017  Primary Care Physician: Mary Ellen Zhou DO    Subjective   Chief Complaint: fever  HPI:  76 year old  male with history of B cell lymphoma who presented with fevers, weakness, anorexia after his second round of chemotherapy.  He was noted to have UTI in the ED and is currently receiving IV antibiotic therapy.  Patient has spiked fevers again today of 103.        Review of Systems   Constitutional: Positive for chills, fatigue and fever. Negative for appetite change.   HENT: Negative for congestion, postnasal drip, rhinorrhea, sinus pressure and sore throat.    Respiratory: Negative for cough, choking, shortness of breath and wheezing.    Cardiovascular: Negative for chest pain, palpitations and leg swelling.   Gastrointestinal: Negative for abdominal pain, constipation, diarrhea, nausea and vomiting.   Genitourinary: Negative for difficulty urinating, dysuria, hematuria and urgency.   Musculoskeletal: Negative for back pain, myalgias and neck pain.   Skin: Negative for color change, pallor, rash and wound.   Neurological: Negative for dizziness, syncope, weakness, light-headedness and headaches.   Psychiatric/Behavioral: Negative for confusion.        All pertinent negatives and positives are as above. All other systems have been reviewed and are negative unless otherwise stated.     Objective    Temp:  [97.1 °F (36.2 °C)-103.4 °F (39.7 °C)] 103.4 °F (39.7 °C)  Heart Rate:  [64-74] 72  Resp:  [16-18] 18  BP: (140-177)/(65-82) 174/82    Physical Exam   Constitutional: He is oriented to person, place, and time. He appears well-developed and well-nourished.   HENT:   Head: Normocephalic and atraumatic.   Eyes: EOM are normal. Pupils are equal, round, and reactive to light.   Neck: Normal range of motion. Neck supple. No tracheal deviation present. No thyromegaly  present.   Cardiovascular: Normal rate, regular rhythm, normal heart sounds and intact distal pulses.  Exam reveals no gallop and no friction rub.    No murmur heard.  Pulmonary/Chest: Effort normal and breath sounds normal. No respiratory distress. He has no wheezes. He has no rales. He exhibits no tenderness.   Abdominal: Soft. Bowel sounds are normal. He exhibits no distension. There is no tenderness.   Musculoskeletal: Normal range of motion. He exhibits no edema or deformity.   Neurological: He is alert and oriented to person, place, and time.   Skin: Skin is warm and dry. No rash noted. No erythema. No pallor.   Psychiatric: He has a normal mood and affect. His behavior is normal. Judgment and thought content normal.           Results Review:  I have reviewed the labs, radiology results, and diagnostic studies.    Laboratory Data:     Results from last 7 days  Lab Units 02/18/17  1045 02/17/17  0609 02/16/17 2119 02/16/17  1716   SODIUM mmol/L 137 135* 134* 130*   POTASSIUM mmol/L 3.7 3.9 4.2 4.1   CHLORIDE mmol/L 103 103 100 96   TOTAL CO2 mmol/L 27.0 29.0 25.0 28.0   BUN mg/dL 11 10 11 11   CREATININE mg/dL 0.97 0.83 0.87 0.94   GLUCOSE mg/dL 99 97 97 96   CALCIUM mg/dL 8.2* 8.1* 8.1* 8.5   BILIRUBIN mg/dL  --  0.4 0.6 0.5   ALK PHOS U/L  --  106 105 112   ALT (SGPT) U/L  --  28 29 28   AST (SGOT) U/L  --  18 21 19   ANION GAP mmol/L 7.0 3.0* 9.0 6.0     Estimated Creatinine Clearance: 66 mL/min (by C-G formula based on Cr of 0.97).            Results from last 7 days  Lab Units 02/18/17  1045 02/17/17  0609 02/16/17 2119 02/16/17  1716   WBC 10*3/mm3 8.72 7.73 5.43 5.00   HEMOGLOBIN g/dL 10.4* 10.3* 11.1* 10.8*   HEMATOCRIT % 29.7* 30.8* 33.2* 31.6*   PLATELETS 10*3/mm3 112* 140* 175 169           Culture Data:   BLOOD CULTURE   Date Value Ref Range Status   02/16/2017 No growth at 24 hours  Preliminary   02/16/2017 No growth at 24 hours  Preliminary     URINE CULTURE   Date Value Ref Range Status    02/16/2017 >100,000 CFU/mL Gram Negative Bacilli (A)  Preliminary     No results found for: RESPCX  No results found for: WOUNDCX  No results found for: STOOLCX  No components found for: BODYFLD    Radiology Data:   Imaging Results (last 24 hours)     Procedure Component Value Units Date/Time    XR Chest 2 View [[id number]] Collected:  02/16/17 1803     Updated:  02/16/17 1810    Narrative:       Patient Name:  LEE STANFORDPatient ID:  [id number]  Ordering:  ELKIN Echavarriaending:  ELKIN TOMLINReferring:  ELKIN TOMLIN------------------------------------------------PROCEDURE:  XR CHEST 2 VIEWS    INDICATION FOR PROCEDURE:  76 years -old patient presents for  evaluation of fever.    COMPARISON:  January 2, 2017.    FINDINGS: XR CHEST two views  reveals the lungs are expanded.  Patient has a right-sided Mediport catheter. The catheter is in  the right inferior vena cava. The tip the catheter is in the  superior vena cava. Cardiac monitoring leads are present.    The thoracic aorta is tortuous with atherosclerotic  calcification. There is mild elevation and eventration of the  right hemidiaphragm. Cardiac silhouette is at the limits of  normal.    There is no radiographic evidence for airspace consolidation,  pleural effusion or pneumothorax. Mediastinal silhouette is  within normal limits. The skeletal structures are within normal  limits.       Impression:       No radiographic evidence of acute cardiopulmonary  disease.    Electronically signed by:  Radha Galeano MD  2/16/2017 6:09 PM [employer]  Workstation: [employer] have reviewed the patient current medications.     Assessment/Plan     Hospital Problem List     DLBCL (diffuse large B cell lymphoma)    Sepsis    Fever          Plan:   1. Sepsis r/t UTI: Urine culture reveals gram negative bacilli and blood cultures are negative, continue IV fluids, Vancomycin, Aztreonam.  WBC normal.  Check lactic acid, CRP  2. Acute cystitis:  cultures reveal gram negative bacilli, Vancomycin, Aztreonam  3. Diffuse large B cell lymphoma: Oncology following.   4. Fever: fever has spiked again.  IV fluids, antipyretics, continue to follow cultures, continue broad spectrum antibiotic coverage.  5. HTN: stable.      Discharge Planning: I expect patient to be discharged to home in 2-3 days.    OSEI Munguia   02/18/17   2:28 PM

## 2017-02-19 LAB
ANION GAP SERPL CALCULATED.3IONS-SCNC: 9 MMOL/L (ref 5–15)
BACTERIA SPEC AEROBE CULT: ABNORMAL
BETA LACTAMASE: ABNORMAL
BUN BLD-MCNC: 14 MG/DL (ref 7–21)
BUN/CREAT SERPL: 17.3 (ref 7–25)
CALCIUM SPEC-SCNC: 8.2 MG/DL (ref 8.4–10.2)
CHLORIDE SERPL-SCNC: 102 MMOL/L (ref 95–110)
CO2 SERPL-SCNC: 25 MMOL/L (ref 22–31)
CREAT BLD-MCNC: 0.81 MG/DL (ref 0.7–1.3)
CRP SERPL-MCNC: 16 MG/DL (ref 0–1)
D-LACTATE SERPL-SCNC: 0.9 MMOL/L (ref 0.5–2)
DEPRECATED RDW RBC AUTO: 41.3 FL (ref 35.1–43.9)
ERYTHROCYTE [DISTWIDTH] IN BLOOD BY AUTOMATED COUNT: 13.7 % (ref 11.5–14.5)
GFR SERPL CREATININE-BSD FRML MDRD: 92 ML/MIN/1.73 (ref 42–98)
GLUCOSE BLD-MCNC: 84 MG/DL (ref 60–100)
HCT VFR BLD AUTO: 30.9 % (ref 39–49)
HGB BLD-MCNC: 10.4 G/DL (ref 13.7–17.3)
MCH RBC QN AUTO: 27.7 PG (ref 26.5–34)
MCHC RBC AUTO-ENTMCNC: 33.7 G/DL (ref 31.5–36.3)
MCV RBC AUTO: 82.4 FL (ref 80–98)
PLATELET # BLD AUTO: 110 10*3/MM3 (ref 150–450)
PMV BLD AUTO: 9.8 FL (ref 8–12)
POTASSIUM BLD-SCNC: 3.9 MMOL/L (ref 3.5–5.1)
RBC # BLD AUTO: 3.75 10*6/MM3 (ref 4.37–5.74)
SODIUM BLD-SCNC: 136 MMOL/L (ref 137–145)
WBC NRBC COR # BLD: 7.12 10*3/MM3 (ref 3.2–9.8)

## 2017-02-19 PROCEDURE — 80048 BASIC METABOLIC PNL TOTAL CA: CPT | Performed by: NURSE PRACTITIONER

## 2017-02-19 PROCEDURE — 25010000002 HYDRALAZINE PER 20 MG: Performed by: HOSPITALIST

## 2017-02-19 PROCEDURE — 99232 SBSQ HOSP IP/OBS MODERATE 35: CPT | Performed by: INTERNAL MEDICINE

## 2017-02-19 PROCEDURE — 83605 ASSAY OF LACTIC ACID: CPT | Performed by: NURSE PRACTITIONER

## 2017-02-19 PROCEDURE — 86140 C-REACTIVE PROTEIN: CPT | Performed by: NURSE PRACTITIONER

## 2017-02-19 PROCEDURE — 85025 COMPLETE CBC W/AUTO DIFF WBC: CPT | Performed by: NURSE PRACTITIONER

## 2017-02-19 RX ORDER — NITROFURANTOIN 25; 75 MG/1; MG/1
100 CAPSULE ORAL EVERY 12 HOURS SCHEDULED
Status: DISCONTINUED | OUTPATIENT
Start: 2017-02-19 | End: 2017-02-20 | Stop reason: HOSPADM

## 2017-02-19 RX ORDER — HYDRALAZINE HYDROCHLORIDE 20 MG/ML
10 INJECTION INTRAMUSCULAR; INTRAVENOUS EVERY 6 HOURS PRN
Status: DISCONTINUED | OUTPATIENT
Start: 2017-02-19 | End: 2017-02-20 | Stop reason: HOSPADM

## 2017-02-19 RX ADMIN — LEVETIRACETAM 500 MG: 500 TABLET, FILM COATED ORAL at 09:19

## 2017-02-19 RX ADMIN — AZTREONAM 2 G: 2 INJECTION, POWDER, FOR SOLUTION INTRAMUSCULAR; INTRAVENOUS at 06:46

## 2017-02-19 RX ADMIN — SODIUM CHLORIDE 75 ML/HR: 9 INJECTION, SOLUTION INTRAVENOUS at 09:19

## 2017-02-19 RX ADMIN — HYDRALAZINE HYDROCHLORIDE 10 MG: 20 INJECTION INTRAMUSCULAR; INTRAVENOUS at 18:18

## 2017-02-19 RX ADMIN — ACETAMINOPHEN 650 MG: 325 TABLET ORAL at 03:45

## 2017-02-19 RX ADMIN — ATENOLOL 25 MG: 25 TABLET ORAL at 21:14

## 2017-02-19 RX ADMIN — ASPIRIN 325 MG: 325 TABLET, COATED ORAL at 09:19

## 2017-02-19 RX ADMIN — NITROFURANTOIN MONOHYDRATE AND NITROFURANTOIN MACROCRYSTALLINE 100 MG: 75; 25 CAPSULE ORAL at 21:14

## 2017-02-19 RX ADMIN — ASPIRIN 325 MG: 325 TABLET, COATED ORAL at 15:05

## 2017-02-19 RX ADMIN — DOXAZOSIN 4 MG: 2 TABLET ORAL at 21:14

## 2017-02-19 RX ADMIN — HYDRALAZINE HYDROCHLORIDE 10 MG: 20 INJECTION INTRAMUSCULAR; INTRAVENOUS at 05:03

## 2017-02-19 RX ADMIN — ATENOLOL 25 MG: 25 TABLET ORAL at 09:19

## 2017-02-19 RX ADMIN — ASPIRIN 325 MG: 325 TABLET, COATED ORAL at 21:14

## 2017-02-19 RX ADMIN — ACETAMINOPHEN 650 MG: 325 TABLET ORAL at 18:25

## 2017-02-19 RX ADMIN — LISINOPRIL 10 MG: 10 TABLET ORAL at 09:19

## 2017-02-19 RX ADMIN — NITROFURANTOIN MONOHYDRATE AND NITROFURANTOIN MACROCRYSTALLINE 100 MG: 75; 25 CAPSULE ORAL at 12:51

## 2017-02-19 RX ADMIN — LEVETIRACETAM 500 MG: 500 TABLET, FILM COATED ORAL at 18:18

## 2017-02-19 NOTE — PROGRESS NOTES
Mr. Steiner states he feels much less tired and sick today, although he still had a fever of 103, overall he states he feels more energetic.  His urine has been clearing with less foul smell and is less cloudy.  He denies any headache or nausea or vomitting or  bleeding.  No skin rashes.    Review of his allergy history shows that he did indeed have a severe anaphylactic reaction after a Penicillin injection  several years ago.    However with regard to allergy to fluoroquinolones he apparently developed a seizure after an injection. It is not clear if this represents a true drug related reaction.    His blood cultures have been negative so far.    Urine cultures do show beta-lactamase producing gram-negative bacilli.    Sensitivity and  identification is still pending.  He is currently on IV Azactam and vancomycin.    Exam shows the port appears to be clean.    Abdomen is soft nontender.  Extremities are without edema.    Oral cavity shows no thrush or petechiae.   A CBC does not show any neutropenia, hemoglobin and platelets are stable.  Chest x-ray shows no pneumonia.      Impression and plan     Testicular DLBCL is post cycle 2  R- CHOP chemotherapy about 2 weeks ago, now with septicemia due to beta lactamase producuing gram-negatives,  likely source is urinary tract.   He is not neutropenic at this time.  Given his severe allergy to penicillin and possible allergy to Levaquin I agree with continuing Aztreonam at this time, particularly since he shows clinical improvement.  However, persistent fever is a bit concerning, and I would like to watch this until tomorrow and if fever persists, we may have to revsiit the antibiotic choice. If however, his fever reoslves and by tomorrow the blood cultures and negative for  gram-positive organisms, vancomycin can be discontinued.      For diffuse large B-cell lymphoma he's status post R CHOP.  He is scheduled to have a return visit for his third cycle of R CHOP, tot he  [facility], which may need to be delayed until full recovery.  We'll continue to follow his course during the hospitalization.

## 2017-02-19 NOTE — PROGRESS NOTES
[facility] Medicine Services  INPATIENT PROGRESS NOTE    Length of Stay: 3  Date of Admission: 2/16/2017  Primary Care Physician: Mary Ellen Zhou DO    Subjective   Chief Complaint: fever  HPI:  76 year old  male with history of B cell lymphoma who presented with fevers, weakness, anorexia after his second round of chemotherapy.  He was noted to have UTI in the ED and is currently receiving IV antibiotic therapy.  Patient has spiked fevers again today of 103.        Review of Systems   Constitutional: Positive for fever. Negative for appetite change, chills and fatigue.   HENT: Negative for congestion, postnasal drip, rhinorrhea, sinus pressure and sore throat.    Respiratory: Negative for cough, choking, shortness of breath and wheezing.    Cardiovascular: Negative for chest pain, palpitations and leg swelling.   Gastrointestinal: Negative for abdominal pain, constipation, diarrhea, nausea and vomiting.   Genitourinary: Negative for difficulty urinating, dysuria, hematuria and urgency.   Musculoskeletal: Negative for back pain, myalgias and neck pain.   Skin: Negative for color change, pallor, rash and wound.   Neurological: Negative for dizziness, syncope, weakness, light-headedness and headaches.   Psychiatric/Behavioral: Negative for confusion.        All pertinent negatives and positives are as above. All other systems have been reviewed and are negative unless otherwise stated.     Objective    Temp:  [98 °F (36.7 °C)-103.4 °F (39.7 °C)] 98 °F (36.7 °C)  Heart Rate:  [58-73] 58  Resp:  [16-18] 18  BP: (149-184)/(69-84) 179/79    Physical Exam   Constitutional: He is oriented to person, place, and time. He appears well-developed and well-nourished.   HENT:   Head: Normocephalic and atraumatic.   Eyes: EOM are normal. Pupils are equal, round, and reactive to light.   Neck: Normal range of motion. Neck supple. No tracheal deviation present. No thyromegaly present.    Cardiovascular: Normal rate, regular rhythm, normal heart sounds and intact distal pulses.  Exam reveals no gallop and no friction rub.    No murmur heard.  Pulmonary/Chest: Effort normal and breath sounds normal. No respiratory distress. He has no wheezes. He has no rales. He exhibits no tenderness.   Abdominal: Soft. Bowel sounds are normal. He exhibits no distension. There is no tenderness.   Musculoskeletal: Normal range of motion. He exhibits no edema or deformity.   Neurological: He is alert and oriented to person, place, and time.   Skin: Skin is warm and dry. No rash noted. No erythema. No pallor.   Psychiatric: He has a normal mood and affect. His behavior is normal. Judgment and thought content normal.           Results Review:  I have reviewed the labs, radiology results, and diagnostic studies.    Laboratory Data:     Results from last 7 days  Lab Units 02/19/17  1005 02/18/17  1045 02/17/17  0609 02/16/17 2119 02/16/17 1716   SODIUM mmol/L 136* 137 135* 134* 130*   POTASSIUM mmol/L 3.9 3.7 3.9 4.2 4.1   CHLORIDE mmol/L 102 103 103 100 96   TOTAL CO2 mmol/L 25.0 27.0 29.0 25.0 28.0   BUN mg/dL 14 11 10 11 11   CREATININE mg/dL 0.81 0.97 0.83 0.87 0.94   GLUCOSE mg/dL 84 99 97 97 96   CALCIUM mg/dL 8.2* 8.2* 8.1* 8.1* 8.5   BILIRUBIN mg/dL  --   --  0.4 0.6 0.5   ALK PHOS U/L  --   --  106 105 112   ALT (SGPT) U/L  --   --  28 29 28   AST (SGOT) U/L  --   --  18 21 19   ANION GAP mmol/L 9.0 7.0 3.0* 9.0 6.0     Estimated Creatinine Clearance: 79.1 mL/min (by C-G formula based on Cr of 0.81).            Results from last 7 days  Lab Units 02/19/17  1005 02/18/17  1045 02/17/17  0609 02/16/17 2119 02/16/17  1716   WBC 10*3/mm3 7.12 8.72 7.73 5.43 5.00   HEMOGLOBIN g/dL 10.4* 10.4* 10.3* 11.1* 10.8*   HEMATOCRIT % 30.9* 29.7* 30.8* 33.2* 31.6*   PLATELETS 10*3/mm3 110* 112* 140* 175 169           Culture Data:   BLOOD CULTURE   Date Value Ref Range Status   02/16/2017 No growth at 2 days  Preliminary    02/16/2017 No growth at 2 days  Preliminary     URINE CULTURE   Date Value Ref Range Status   02/16/2017 >100,000 CFU/mL Escherichia coli (A)  Final     No results found for: RESPCX  No results found for: WOUNDCX  No results found for: STOOLCX  No components found for: BODYFLD    Radiology Data:   Imaging Results (last 24 hours)     Procedure Component Value Units Date/Time    XR Chest 2 View [[id number]] Collected:  02/16/17 1803     Updated:  02/16/17 1810    Narrative:       Patient Name:  LEE STANFORDPatient ID:  [id number]  Ordering:  ELKIN Echavarriaending:  ELKIN TOMLINReferring:  ELKIN TOMLIN------------------------------------------------PROCEDURE:  XR CHEST 2 VIEWS    INDICATION FOR PROCEDURE:  76 years -old patient presents for  evaluation of fever.    COMPARISON:  January 2, 2017.    FINDINGS: XR CHEST two views  reveals the lungs are expanded.  Patient has a right-sided Mediport catheter. The catheter is in  the right inferior vena cava. The tip the catheter is in the  superior vena cava. Cardiac monitoring leads are present.    The thoracic aorta is tortuous with atherosclerotic  calcification. There is mild elevation and eventration of the  right hemidiaphragm. Cardiac silhouette is at the limits of  normal.    There is no radiographic evidence for airspace consolidation,  pleural effusion or pneumothorax. Mediastinal silhouette is  within normal limits. The skeletal structures are within normal  limits.       Impression:       No radiographic evidence of acute cardiopulmonary  disease.    Electronically signed by:  Radha Galeano MD  2/16/2017 6:09 PM [employer]  Workstation: [email or web address] have reviewed the patient current medications.     Assessment/Plan     Hospital Problem List     DLBCL (diffuse large B cell lymphoma)    Sepsis    Fever          Plan:   1. Sepsis r/t UTI: Urine culture reveals E. Coli and blood cultures are negative, continue IV fluids, antibiotics  changed to PO in anticipation of discharge tomorrow morning.  Sensitive to Macrobid.  WBC normal.  CRP improved and lactic acid normal.    2. Acute cystitis: cultures reveal E coli with sensitivities to Bactrim.  Abx changed to PO in anticipation of discharge tomorrow.   3. Diffuse large B cell lymphoma: Oncology following.   4. Fever: fever spiked to 103.4 yesterday but is now stable.  Oncology wishes to monitor overnight and if fevers and WBC counts are stable, patient can be discharged tomorrow.  5. HTN: stable.      Discharge Planning: I expect patient to be discharged to home in 1-2 days.    Rosey Sr, APRN   02/19/17   12:46 PM

## 2017-02-20 VITALS
WEIGHT: 192.4 LBS | HEART RATE: 61 BPM | TEMPERATURE: 100 F | RESPIRATION RATE: 16 BRPM | HEIGHT: 66 IN | BODY MASS INDEX: 30.92 KG/M2 | DIASTOLIC BLOOD PRESSURE: 62 MMHG | OXYGEN SATURATION: 91 % | SYSTOLIC BLOOD PRESSURE: 168 MMHG

## 2017-02-20 PROBLEM — R50.9 FEVER: Status: RESOLVED | Noted: 2017-02-16 | Resolved: 2017-02-20

## 2017-02-20 PROBLEM — A41.9 SEPSIS: Status: RESOLVED | Noted: 2017-02-16 | Resolved: 2017-02-20

## 2017-02-20 LAB
ANION GAP SERPL CALCULATED.3IONS-SCNC: 10 MMOL/L (ref 5–15)
BASOPHILS # BLD AUTO: 0.04 10*3/MM3 (ref 0–0.2)
BASOPHILS NFR BLD AUTO: 0.5 % (ref 0–2)
BUN BLD-MCNC: 12 MG/DL (ref 7–21)
BUN/CREAT SERPL: 16.7 (ref 7–25)
CALCIUM SPEC-SCNC: 8.3 MG/DL (ref 8.4–10.2)
CHLORIDE SERPL-SCNC: 103 MMOL/L (ref 95–110)
CO2 SERPL-SCNC: 24 MMOL/L (ref 22–31)
CREAT BLD-MCNC: 0.72 MG/DL (ref 0.7–1.3)
DEPRECATED RDW RBC AUTO: 42 FL (ref 35.1–43.9)
EOSINOPHIL # BLD AUTO: 0.08 10*3/MM3 (ref 0–0.7)
EOSINOPHIL NFR BLD AUTO: 1.1 % (ref 0–7)
ERYTHROCYTE [DISTWIDTH] IN BLOOD BY AUTOMATED COUNT: 13.9 % (ref 11.5–14.5)
GFR SERPL CREATININE-BSD FRML MDRD: 106 ML/MIN/1.73 (ref 42–98)
GLUCOSE BLD-MCNC: 84 MG/DL (ref 60–100)
HCT VFR BLD AUTO: 30.7 % (ref 39–49)
HGB BLD-MCNC: 10.5 G/DL (ref 13.7–17.3)
IMM GRANULOCYTES # BLD: 0.66 10*3/MM3 (ref 0–0.02)
IMM GRANULOCYTES NFR BLD: 9 % (ref 0–0.5)
LYMPHOCYTES # BLD AUTO: 0.43 10*3/MM3 (ref 0.6–4.2)
LYMPHOCYTES NFR BLD AUTO: 5.9 % (ref 10–50)
MCH RBC QN AUTO: 28 PG (ref 26.5–34)
MCHC RBC AUTO-ENTMCNC: 34.2 G/DL (ref 31.5–36.3)
MCV RBC AUTO: 81.9 FL (ref 80–98)
MONOCYTES # BLD AUTO: 0.4 10*3/MM3 (ref 0–0.9)
MONOCYTES NFR BLD AUTO: 5.4 % (ref 0–12)
NEUTROPHILS # BLD AUTO: 5.73 10*3/MM3 (ref 2–8.6)
NEUTROPHILS NFR BLD AUTO: 78.1 % (ref 37–80)
PLATELET # BLD AUTO: 120 10*3/MM3 (ref 150–450)
PMV BLD AUTO: 10.6 FL (ref 8–12)
POTASSIUM BLD-SCNC: 3.8 MMOL/L (ref 3.5–5.1)
RBC # BLD AUTO: 3.75 10*6/MM3 (ref 4.37–5.74)
SODIUM BLD-SCNC: 137 MMOL/L (ref 137–145)
WBC NRBC COR # BLD: 7.34 10*3/MM3 (ref 3.2–9.8)

## 2017-02-20 PROCEDURE — 85025 COMPLETE CBC W/AUTO DIFF WBC: CPT | Performed by: NURSE PRACTITIONER

## 2017-02-20 PROCEDURE — 80048 BASIC METABOLIC PNL TOTAL CA: CPT | Performed by: NURSE PRACTITIONER

## 2017-02-20 RX ORDER — ACETAMINOPHEN 325 MG/1
650 TABLET ORAL EVERY 4 HOURS PRN
Refills: 0
Start: 2017-02-20 | End: 2019-07-15

## 2017-02-20 RX ORDER — NITROFURANTOIN 25; 75 MG/1; MG/1
100 CAPSULE ORAL EVERY 12 HOURS SCHEDULED
Qty: 12 CAPSULE | Refills: 0 | Status: SHIPPED | OUTPATIENT
Start: 2017-02-20 | End: 2017-02-26

## 2017-02-20 RX ADMIN — ASPIRIN 81 MG 81 MG: 81 TABLET ORAL at 09:34

## 2017-02-20 RX ADMIN — LISINOPRIL 10 MG: 10 TABLET ORAL at 09:27

## 2017-02-20 RX ADMIN — Medication 10 ML: at 09:28

## 2017-02-20 RX ADMIN — NITROFURANTOIN MONOHYDRATE AND NITROFURANTOIN MACROCRYSTALLINE 100 MG: 75; 25 CAPSULE ORAL at 09:27

## 2017-02-20 RX ADMIN — ATENOLOL 25 MG: 25 TABLET ORAL at 09:27

## 2017-02-20 RX ADMIN — LEVETIRACETAM 500 MG: 500 TABLET, FILM COATED ORAL at 09:27

## 2017-02-20 NOTE — PAYOR COMM NOTE
"Sharan Stanford (76 y.o. Male)     Date of Birth Social Security Number Address Home Phone MRN    1940  [id number]  [facility] [address] [phone] [id number]    Jew Marital Status          Oriental orthodox        Admission Date Admission Type Admitting Provider Attending Provider Department, Room/Bed    2/16/17 Emergency Conrado Ahumada MD Popescu, Tudor, MD [address], 427/1    Discharge Date Discharge Disposition Discharge Destination                      Attending Provider: Philip Dodson MD     Allergies:  Crestor [Rosuvastatin Calcium], Levofloxacin, Penicillins, Sulfa Antibiotics, Tramadol    Isolation:  None   Infection:  None   Code Status:  FULL    Ht:  66\" (167.6 cm)   Wt:  192 lb 6.4 oz (87.3 kg)    Admission Cmt:  None   Principal Problem:  None                Active Insurance as of 2/16/2017     Primary Coverage     Payor Plan Insurance Group Employer/Plan Group    HUMANA MEDICARE REPL HUMANA MEDICARE REPL [id number]     Payor Plan Address Payor Plan Phone Number Effective From Effective To    [address] [address] [phone] 1/1/2013     [place] [address]       Subscriber Name Subscriber Birth Date Member ID       SHARAN STANFORD 1940 [id number]                 Emergency Contacts      (Rel.) Home Phone Work Phone Mobile Phone    Johanna Stanford (Spouse) [phone] -- --    Isela Gastleum (Daughter) [phone] -- --            Vital Signs (last 72 hrs)       02/17 0700  -  02/18 0659 02/18 0700  -  02/19 0659 02/19 0700  -  02/20 0659 02/20 0700  -  02/20 0855   Most Recent    Temp (°F) 97.1 -  (!)100.9    99.3 -  (!)103.4    97.2 -  (!)100.6      97.7     97.7 (36.5)    Heart Rate 64 -  76    62 -  73    58 -  73      68     68    Resp 16 -  18    16 -  18    16 -  18      16     16    /65 -  164/72    149/69 -  (!) 184/84    160/76 -  (!) 192/92      (!) 200/74     (!) 200/74    SpO2 (%) 92 -  94    92 -  94    92 -  " 95      94     94             Physician Progress Notes (last 72 hours) (Notes from 2/17/2017  8:55 AM through 2/20/2017  8:55 AM)      Rosey SrOSEI at 2/17/2017  2:34 PM  Version 1 of 1             [facility] Medicine Services  INPATIENT PROGRESS NOTE    Length of Stay: 1  Date of Admission: 2/16/2017  Primary Care Physician: Mary Ellen Zhou DO    Subjective   Chief Complaint: fever  HPI:  76 year old  male with history of B cell lymphoma who presented yesterday with fevers, weakness, anorexia after his second round of chemotherapy.  He was noted to have UTI in the ED and is currently receiving IV antibiotic therapy.  Urine and blood cultures are pending.       Review of Systems   Constitutional: Positive for appetite change, chills, fatigue and fever.   HENT: Negative for congestion, postnasal drip, rhinorrhea, sinus pressure and sore throat.    Respiratory: Negative for cough, choking, shortness of breath and wheezing.    Cardiovascular: Negative for chest pain, palpitations and leg swelling.   Gastrointestinal: Negative for abdominal pain, constipation, diarrhea, nausea and vomiting.   Genitourinary: Positive for urgency. Negative for difficulty urinating, dysuria and hematuria.   Musculoskeletal: Negative for back pain, myalgias and neck pain.   Skin: Negative for color change, pallor, rash and wound.   Neurological: Negative for dizziness, syncope, weakness, light-headedness and headaches.   Psychiatric/Behavioral: Negative for confusion.        All pertinent negatives and positives are as above. All other systems have been reviewed and are negative unless otherwise stated.     Objective    Temp:  [96.9 °F (36.1 °C)-102.4 °F (39.1 °C)] 100.3 °F (37.9 °C)  Heart Rate:  [60-78] 68  Resp:  [16-21] 16  BP: (116-169)/(64-84) 152/67    Physical Exam   Constitutional: He is oriented to person, place, and time. He appears well-developed and well-nourished.   HENT:    Head: Normocephalic and atraumatic.   Eyes: EOM are normal. Pupils are equal, round, and reactive to light.   Neck: Normal range of motion. Neck supple. No tracheal deviation present. No thyromegaly present.   Cardiovascular: Normal rate, regular rhythm, normal heart sounds and intact distal pulses.  Exam reveals no gallop and no friction rub.    No murmur heard.  Pulmonary/Chest: Effort normal and breath sounds normal. No respiratory distress. He has no wheezes. He has no rales. He exhibits no tenderness.   Abdominal: Soft. Bowel sounds are normal. He exhibits no distension. There is no tenderness.   Musculoskeletal: Normal range of motion. He exhibits no edema or deformity.   Neurological: He is alert and oriented to person, place, and time.   Skin: Skin is warm and dry. No rash noted. No erythema. No pallor.   Psychiatric: He has a normal mood and affect. His behavior is normal. Judgment and thought content normal.           Results Review:  I have reviewed the labs, radiology results, and diagnostic studies.    Laboratory Data:     Results from last 7 days  Lab Units 02/17/17  0609 02/16/17 2119 02/16/17 1716   SODIUM mmol/L 135* 134* 130*   POTASSIUM mmol/L 3.9 4.2 4.1   CHLORIDE mmol/L 103 100 96   TOTAL CO2 mmol/L 29.0 25.0 28.0   BUN mg/dL 10 11 11   CREATININE mg/dL 0.83 0.87 0.94   GLUCOSE mg/dL 97 97 96   CALCIUM mg/dL 8.1* 8.1* 8.5   BILIRUBIN mg/dL 0.4 0.6 0.5   ALK PHOS U/L 106 105 112   ALT (SGPT) U/L 28 29 28   AST (SGOT) U/L 18 21 19   ANION GAP mmol/L 3.0* 9.0 6.0     Estimated Creatinine Clearance: 77.2 mL/min (by C-G formula based on Cr of 0.83).            Results from last 7 days  Lab Units 02/17/17  0609 02/16/17 2119 02/16/17 1716   WBC 10*3/mm3 7.73 5.43 5.00   HEMOGLOBIN g/dL 10.3* 11.1* 10.8*   HEMATOCRIT % 30.8* 33.2* 31.6*   PLATELETS 10*3/mm3 140* 175 169           Culture Data:   BLOOD CULTURE   Date Value Ref Range Status   02/16/2017 No growth at less than 24 hours  Preliminary    02/16/2017 No growth at less than 24 hours  Preliminary     URINE CULTURE   Date Value Ref Range Status   02/16/2017 Culture in progress  Preliminary     No results found for: RESPCX  No results found for: WOUNDCX  No results found for: STOOLCX  No components found for: BODYFLD    Radiology Data:   Imaging Results (last 24 hours)     Procedure Component Value Units Date/Time    XR Chest 2 View [[id number]] Collected:  02/16/17 1803     Updated:  02/16/17 1810    Narrative:       Patient Name:  LEE STANFORDPatient ID:  [id number]  Ordering:  ELKIN Echavarriaending:  ELKIN TOMLINReferring:  ELKIN TOMLIN------------------------------------------------PROCEDURE:  XR CHEST 2 VIEWS    INDICATION FOR PROCEDURE:  76 years -old patient presents for  evaluation of fever.    COMPARISON:  January 2, 2017.    FINDINGS: XR CHEST two views  reveals the lungs are expanded.  Patient has a right-sided Mediport catheter. The catheter is in  the right inferior vena cava. The tip the catheter is in the  superior vena cava. Cardiac monitoring leads are present.    The thoracic aorta is tortuous with atherosclerotic  calcification. There is mild elevation and eventration of the  right hemidiaphragm. Cardiac silhouette is at the limits of  normal.    There is no radiographic evidence for airspace consolidation,  pleural effusion or pneumothorax. Mediastinal silhouette is  within normal limits. The skeletal structures are within normal  limits.       Impression:       No radiographic evidence of acute cardiopulmonary  disease.    Electronically signed by:  Radha Galeano MD  2/16/2017 6:09 PM CST  Workstation: [employer] have reviewed the patient current medications.     Assessment/Plan     Hospital Problem List     DLBCL (diffuse large B cell lymphoma)    Sepsis    Fever          Plan:   1. Sepsis r/t UTI: Urine and blood cultures pending, IV fluids, Vancomycin, Aztreonam.  WBC improved, lactic acid  normal.    2. Acute cystitis: cultures pending, Vancomycin, Aztreonam  3. Diffuse large B cell lymphoma: Oncology following.   4. Fever: resolved.   5. HTN: stable.      Discharge Planning: I expect patient to be discharged to home in 2 days.    OSEI Munguia   02/17/17   2:34 PM       Electronically signed by OSEI Munguia at 2/17/2017  2:44 PM      OSEI Munguia at 2/18/2017  2:27 PM  Version 1 of 1             [facility] Medicine Services  INPATIENT PROGRESS NOTE    Length of Stay: 2  Date of Admission: 2/16/2017  Primary Care Physician: Mary Ellen Zhou DO    Subjective   Chief Complaint: fever  HPI:  76 year old  male with history of B cell lymphoma who presented with fevers, weakness, anorexia after his second round of chemotherapy.  He was noted to have UTI in the ED and is currently receiving IV antibiotic therapy.  Patient has spiked fevers again today of 103.        Review of Systems   Constitutional: Positive for chills, fatigue and fever. Negative for appetite change.   HENT: Negative for congestion, postnasal drip, rhinorrhea, sinus pressure and sore throat.    Respiratory: Negative for cough, choking, shortness of breath and wheezing.    Cardiovascular: Negative for chest pain, palpitations and leg swelling.   Gastrointestinal: Negative for abdominal pain, constipation, diarrhea, nausea and vomiting.   Genitourinary: Negative for difficulty urinating, dysuria, hematuria and urgency.   Musculoskeletal: Negative for back pain, myalgias and neck pain.   Skin: Negative for color change, pallor, rash and wound.   Neurological: Negative for dizziness, syncope, weakness, light-headedness and headaches.   Psychiatric/Behavioral: Negative for confusion.        All pertinent negatives and positives are as above. All other systems have been reviewed and are negative unless otherwise stated.     Objective    Temp:  [97.1 °F (36.2 °C)-103.4 °F (39.7  °C)] 103.4 °F (39.7 °C)  Heart Rate:  [64-74] 72  Resp:  [16-18] 18  BP: (140-177)/(65-82) 174/82    Physical Exam   Constitutional: He is oriented to person, place, and time. He appears well-developed and well-nourished.   HENT:   Head: Normocephalic and atraumatic.   Eyes: EOM are normal. Pupils are equal, round, and reactive to light.   Neck: Normal range of motion. Neck supple. No tracheal deviation present. No thyromegaly present.   Cardiovascular: Normal rate, regular rhythm, normal heart sounds and intact distal pulses.  Exam reveals no gallop and no friction rub.    No murmur heard.  Pulmonary/Chest: Effort normal and breath sounds normal. No respiratory distress. He has no wheezes. He has no rales. He exhibits no tenderness.   Abdominal: Soft. Bowel sounds are normal. He exhibits no distension. There is no tenderness.   Musculoskeletal: Normal range of motion. He exhibits no edema or deformity.   Neurological: He is alert and oriented to person, place, and time.   Skin: Skin is warm and dry. No rash noted. No erythema. No pallor.   Psychiatric: He has a normal mood and affect. His behavior is normal. Judgment and thought content normal.           Results Review:  I have reviewed the labs, radiology results, and diagnostic studies.    Laboratory Data:     Results from last 7 days  Lab Units 02/18/17  1045 02/17/17  0609 02/16/17  2119 02/16/17  1716   SODIUM mmol/L 137 135* 134* 130*   POTASSIUM mmol/L 3.7 3.9 4.2 4.1   CHLORIDE mmol/L 103 103 100 96   TOTAL CO2 mmol/L 27.0 29.0 25.0 28.0   BUN mg/dL 11 10 11 11   CREATININE mg/dL 0.97 0.83 0.87 0.94   GLUCOSE mg/dL 99 97 97 96   CALCIUM mg/dL 8.2* 8.1* 8.1* 8.5   BILIRUBIN mg/dL  --  0.4 0.6 0.5   ALK PHOS U/L  --  106 105 112   ALT (SGPT) U/L  --  28 29 28   AST (SGOT) U/L  --  18 21 19   ANION GAP mmol/L 7.0 3.0* 9.0 6.0     Estimated Creatinine Clearance: 66 mL/min (by C-G formula based on Cr of 0.97).            Results from last 7 days  Lab Units  02/18/17  1045 02/17/17  0609 02/16/17  2119 02/16/17  1716   WBC 10*3/mm3 8.72 7.73 5.43 5.00   HEMOGLOBIN g/dL 10.4* 10.3* 11.1* 10.8*   HEMATOCRIT % 29.7* 30.8* 33.2* 31.6*   PLATELETS 10*3/mm3 112* 140* 175 169           Culture Data:   BLOOD CULTURE   Date Value Ref Range Status   02/16/2017 No growth at 24 hours  Preliminary   02/16/2017 No growth at 24 hours  Preliminary     URINE CULTURE   Date Value Ref Range Status   02/16/2017 >100,000 CFU/mL Gram Negative Bacilli (A)  Preliminary     No results found for: RESPCX  No results found for: WOUNDCX  No results found for: STOOLCX  No components found for: BODYFLD    Radiology Data:   Imaging Results (last 24 hours)     Procedure Component Value Units Date/Time    XR Chest 2 View [[id number]] Collected:  02/16/17 1803     Updated:  02/16/17 1810    Narrative:       Patient Name:  LEE STANFORDPatient ID:  [id number]  Ordering:  ELKIN Echavarriaending:  ELKIN TOMLINReferring:  ELKIN TOMLIN------------------------------------------------PROCEDURE:  XR CHEST 2 VIEWS    INDICATION FOR PROCEDURE:  76 years -old patient presents for  evaluation of fever.    COMPARISON:  January 2, 2017.    FINDINGS: XR CHEST two views  reveals the lungs are expanded.  Patient has a right-sided Mediport catheter. The catheter is in  the right inferior vena cava. The tip the catheter is in the  superior vena cava. Cardiac monitoring leads are present.    The thoracic aorta is tortuous with atherosclerotic  calcification. There is mild elevation and eventration of the  right hemidiaphragm. Cardiac silhouette is at the limits of  normal.    There is no radiographic evidence for airspace consolidation,  pleural effusion or pneumothorax. Mediastinal silhouette is  within normal limits. The skeletal structures are within normal  limits.       Impression:       No radiographic evidence of acute cardiopulmonary  disease.    Electronically signed by:  Radha Galeano MD   2/16/2017 6:09 PM CST  Workstation: [email or web address]          I have reviewed the patient current medications.     Assessment/Plan     Hospital Problem List     DLBCL (diffuse large B cell lymphoma)    Sepsis    Fever          Plan:   1. Sepsis r/t UTI: Urine culture reveals gram negative bacilli and blood cultures are negative, continue IV fluids, Vancomycin, Aztreonam.  WBC normal.  Check lactic acid, CRP  2. Acute cystitis: cultures reveal gram negative bacilli, Vancomycin, Aztreonam  3. Diffuse large B cell lymphoma: Oncology following.   4. Fever: fever has spiked again.  IV fluids, antipyretics, continue to follow cultures, continue broad spectrum antibiotic coverage.  5. HTN: stable.      Discharge Planning: I expect patient to be discharged to home in 2-3 days.    OSEI Munguia   02/18/17   2:28 PM       Electronically signed by OSEI Munguia at 2/18/2017  2:32 PM      Glenda Tinajero MD at 2/18/2017  6:26 PM  Version 1 of 1         Mr. Steiner states he feels much less tired and sick today, although he still had a fever of 103, overall he states he feels more energetic.  His urine has been clearing with less foul smell and is less cloudy.  He denies any headache or nausea or vomitting or  bleeding.  No skin rashes.    Review of his allergy history shows that he did indeed have a severe anaphylactic reaction after a Penicillin injection  several years ago.    However with regard to allergy to fluoroquinolones he apparently developed a seizure after an injection. It is not clear if this represents a true drug related reaction.    His blood cultures have been negative so far.    Urine cultures do show beta-lactamase producing gram-negative bacilli.    Sensitivity and  identification is still pending.  He is currently on IV Azactam and vancomycin.    Exam shows the port appears to be clean.    Abdomen is soft nontender.  Extremities are without edema.    Oral cavity shows no thrush or petechiae.   A  CBC does not show any neutropenia, hemoglobin and platelets are stable.  Chest x-ray shows no pneumonia.      Impression and plan     Testicular DLBCL is post cycle 2  R- CHOP chemotherapy about 2 weeks ago, now with septicemia due to beta lactamase producuing gram-negatives,  likely source is urinary tract.   He is not neutropenic at this time.  Given his severe allergy to penicillin and possible allergy to Levaquin I agree with continuing Aztreonam at this time, particularly since he shows clinical improvement.  However, persistent fever is a bit concerning, and I would like to watch this until tomorrow and if fever persists, we may have to revsiit the antibiotic choice. If however, his fever reoslves and by tomorrow the blood cultures and negative for  gram-positive organisms, vancomycin can be discontinued.      For diffuse large B-cell lymphoma he's status post R CHOP.  He is scheduled to have a return visit for his third cycle of R CHOP, tot [facility], which may need to be delayed until full recovery.  We'll continue to follow his course during the hospitalization.     Electronically signed by Glenda Tinajero MD at 2/19/2017 12:04 PM      OSEI Munguia at 2/19/2017 12:46 PM  Version 2 of 2             [facility] Medicine Services  INPATIENT PROGRESS NOTE    Length of Stay: 3  Date of Admission: 2/16/2017  Primary Care Physician: Mary Ellen Zhou DO    Subjective   Chief Complaint: fever  HPI:  76 year old  male with history of B cell lymphoma who presented with fevers, weakness, anorexia after his second round of chemotherapy.  He was noted to have UTI in the ED and is currently receiving IV antibiotic therapy.  Patient has spiked fevers again today of 103.        Review of Systems   Constitutional: Positive for fever. Negative for appetite change, chills and fatigue.   HENT: Negative for congestion, postnasal drip, rhinorrhea, sinus pressure  and sore throat.    Respiratory: Negative for cough, choking, shortness of breath and wheezing.    Cardiovascular: Negative for chest pain, palpitations and leg swelling.   Gastrointestinal: Negative for abdominal pain, constipation, diarrhea, nausea and vomiting.   Genitourinary: Negative for difficulty urinating, dysuria, hematuria and urgency.   Musculoskeletal: Negative for back pain, myalgias and neck pain.   Skin: Negative for color change, pallor, rash and wound.   Neurological: Negative for dizziness, syncope, weakness, light-headedness and headaches.   Psychiatric/Behavioral: Negative for confusion.        All pertinent negatives and positives are as above. All other systems have been reviewed and are negative unless otherwise stated.     Objective    Temp:  [98 °F (36.7 °C)-103.4 °F (39.7 °C)] 98 °F (36.7 °C)  Heart Rate:  [58-73] 58  Resp:  [16-18] 18  BP: (149-184)/(69-84) 179/79    Physical Exam   Constitutional: He is oriented to person, place, and time. He appears well-developed and well-nourished.   HENT:   Head: Normocephalic and atraumatic.   Eyes: EOM are normal. Pupils are equal, round, and reactive to light.   Neck: Normal range of motion. Neck supple. No tracheal deviation present. No thyromegaly present.   Cardiovascular: Normal rate, regular rhythm, normal heart sounds and intact distal pulses.  Exam reveals no gallop and no friction rub.    No murmur heard.  Pulmonary/Chest: Effort normal and breath sounds normal. No respiratory distress. He has no wheezes. He has no rales. He exhibits no tenderness.   Abdominal: Soft. Bowel sounds are normal. He exhibits no distension. There is no tenderness.   Musculoskeletal: Normal range of motion. He exhibits no edema or deformity.   Neurological: He is alert and oriented to person, place, and time.   Skin: Skin is warm and dry. No rash noted. No erythema. No pallor.   Psychiatric: He has a normal mood and affect. His behavior is normal. Judgment and  thought content normal.           Results Review:  I have reviewed the labs, radiology results, and diagnostic studies.    Laboratory Data:     Results from last 7 days  Lab Units 02/19/17  1005 02/18/17  1045 02/17/17  0609 02/16/17 2119 02/16/17  1716   SODIUM mmol/L 136* 137 135* 134* 130*   POTASSIUM mmol/L 3.9 3.7 3.9 4.2 4.1   CHLORIDE mmol/L 102 103 103 100 96   TOTAL CO2 mmol/L 25.0 27.0 29.0 25.0 28.0   BUN mg/dL 14 11 10 11 11   CREATININE mg/dL 0.81 0.97 0.83 0.87 0.94   GLUCOSE mg/dL 84 99 97 97 96   CALCIUM mg/dL 8.2* 8.2* 8.1* 8.1* 8.5   BILIRUBIN mg/dL  --   --  0.4 0.6 0.5   ALK PHOS U/L  --   --  106 105 112   ALT (SGPT) U/L  --   --  28 29 28   AST (SGOT) U/L  --   --  18 21 19   ANION GAP mmol/L 9.0 7.0 3.0* 9.0 6.0     Estimated Creatinine Clearance: 79.1 mL/min (by C-G formula based on Cr of 0.81).            Results from last 7 days  Lab Units 02/19/17  1005 02/18/17  1045 02/17/17  0609 02/16/17 2119 02/16/17  1716   WBC 10*3/mm3 7.12 8.72 7.73 5.43 5.00   HEMOGLOBIN g/dL 10.4* 10.4* 10.3* 11.1* 10.8*   HEMATOCRIT % 30.9* 29.7* 30.8* 33.2* 31.6*   PLATELETS 10*3/mm3 110* 112* 140* 175 169           Culture Data:   BLOOD CULTURE   Date Value Ref Range Status   02/16/2017 No growth at 2 days  Preliminary   02/16/2017 No growth at 2 days  Preliminary     URINE CULTURE   Date Value Ref Range Status   02/16/2017 >100,000 CFU/mL Escherichia coli (A)  Final     No results found for: RESPCX  No results found for: WOUNDCX  No results found for: STOOLCX  No components found for: BODYFLD    Radiology Data:   Imaging Results (last 24 hours)     Procedure Component Value Units Date/Time    XR Chest 2 View [[id number]] Collected:  02/16/17 1803     Updated:  02/16/17 1810    Narrative:       Patient Name:  LEE STANFORDPatient ID:  [id number]  Ordering:  ELKIN Echavarriaending:  ELKIN TOMLINReferring:  ELKIN TOMLIN------------------------------------------------PROCEDURE:  XR CHEST 2  VIEWS    INDICATION FOR PROCEDURE:  76 years -old patient presents for  evaluation of fever.    COMPARISON:  January 2, 2017.    FINDINGS: XR CHEST two views  reveals the lungs are expanded.  Patient has a right-sided Mediport catheter. The catheter is in  the right inferior vena cava. The tip the catheter is in the  superior vena cava. Cardiac monitoring leads are present.    The thoracic aorta is tortuous with atherosclerotic  calcification. There is mild elevation and eventration of the  right hemidiaphragm. Cardiac silhouette is at the limits of  normal.    There is no radiographic evidence for airspace consolidation,  pleural effusion or pneumothorax. Mediastinal silhouette is  within normal limits. The skeletal structures are within normal  limits.       Impression:       No radiographic evidence of acute cardiopulmonary  disease.    Electronically signed by:  Radha Galeano MD  2/16/2017 6:09 PM CST  Workstation: [email or web address]          I have reviewed the patient current medications.     Assessment/Plan     Hospital Problem List     DLBCL (diffuse large B cell lymphoma)    Sepsis    Fever          Plan:   1. Sepsis r/t UTI: Urine culture reveals E. Coli and blood cultures are negative, continue IV fluids, antibiotics changed to PO in anticipation of discharge tomorrow morning.  Sensitive to Macrobid.  WBC normal.  CRP improved and lactic acid normal.    2. Acute cystitis: cultures reveal E coli with sensitivities to Bactrim.  Abx changed to PO in anticipation of discharge tomorrow.   3. Diffuse large B cell lymphoma: Oncology following.   4. Fever: fever spiked to 103.4 yesterday but is now stable.  Oncology wishes to monitor overnight and if fevers and WBC counts are stable, patient can be discharged tomorrow.  5. HTN: stable.      Discharge Planning: I expect patient to be discharged to home in 1-2 days.    OSEI Munguia   02/19/17   12:46 PM       Electronically signed by OSEI Munguia at  2/19/2017 12:50 PM      Rosey Sr APRCLARENCE at 2/19/2017 12:46 PM  Version 1 of 2             [facility] Medicine Services  INPATIENT PROGRESS NOTE    Length of Stay: 3  Date of Admission: 2/16/2017  Primary Care Physician: Mary Ellen Zhou DO    Subjective   Chief Complaint: fever  HPI:  76 year old  male with history of B cell lymphoma who presented with fevers, weakness, anorexia after his second round of chemotherapy.  He was noted to have UTI in the ED and is currently receiving IV antibiotic therapy.  Patient has spiked fevers again today of 103.        Review of Systems   Constitutional: Positive for chills, fatigue and fever. Negative for appetite change.   HENT: Negative for congestion, postnasal drip, rhinorrhea, sinus pressure and sore throat.    Respiratory: Negative for cough, choking, shortness of breath and wheezing.    Cardiovascular: Negative for chest pain, palpitations and leg swelling.   Gastrointestinal: Negative for abdominal pain, constipation, diarrhea, nausea and vomiting.   Genitourinary: Negative for difficulty urinating, dysuria, hematuria and urgency.   Musculoskeletal: Negative for back pain, myalgias and neck pain.   Skin: Negative for color change, pallor, rash and wound.   Neurological: Negative for dizziness, syncope, weakness, light-headedness and headaches.   Psychiatric/Behavioral: Negative for confusion.        All pertinent negatives and positives are as above. All other systems have been reviewed and are negative unless otherwise stated.     Objective    Temp:  [98 °F (36.7 °C)-103.4 °F (39.7 °C)] 98 °F (36.7 °C)  Heart Rate:  [58-73] 58  Resp:  [16-18] 18  BP: (149-184)/(69-84) 179/79    Physical Exam   Constitutional: He is oriented to person, place, and time. He appears well-developed and well-nourished.   HENT:   Head: Normocephalic and atraumatic.   Eyes: EOM are normal. Pupils are equal, round, and reactive to light.   Neck:  Normal range of motion. Neck supple. No tracheal deviation present. No thyromegaly present.   Cardiovascular: Normal rate, regular rhythm, normal heart sounds and intact distal pulses.  Exam reveals no gallop and no friction rub.    No murmur heard.  Pulmonary/Chest: Effort normal and breath sounds normal. No respiratory distress. He has no wheezes. He has no rales. He exhibits no tenderness.   Abdominal: Soft. Bowel sounds are normal. He exhibits no distension. There is no tenderness.   Musculoskeletal: Normal range of motion. He exhibits no edema or deformity.   Neurological: He is alert and oriented to person, place, and time.   Skin: Skin is warm and dry. No rash noted. No erythema. No pallor.   Psychiatric: He has a normal mood and affect. His behavior is normal. Judgment and thought content normal.           Results Review:  I have reviewed the labs, radiology results, and diagnostic studies.    Laboratory Data:     Results from last 7 days  Lab Units 02/19/17  1005 02/18/17  1045 02/17/17 0609 02/16/17 2119 02/16/17 1716   SODIUM mmol/L 136* 137 135* 134* 130*   POTASSIUM mmol/L 3.9 3.7 3.9 4.2 4.1   CHLORIDE mmol/L 102 103 103 100 96   TOTAL CO2 mmol/L 25.0 27.0 29.0 25.0 28.0   BUN mg/dL 14 11 10 11 11   CREATININE mg/dL 0.81 0.97 0.83 0.87 0.94   GLUCOSE mg/dL 84 99 97 97 96   CALCIUM mg/dL 8.2* 8.2* 8.1* 8.1* 8.5   BILIRUBIN mg/dL  --   --  0.4 0.6 0.5   ALK PHOS U/L  --   --  106 105 112   ALT (SGPT) U/L  --   --  28 29 28   AST (SGOT) U/L  --   --  18 21 19   ANION GAP mmol/L 9.0 7.0 3.0* 9.0 6.0     Estimated Creatinine Clearance: 79.1 mL/min (by C-G formula based on Cr of 0.81).            Results from last 7 days  Lab Units 02/19/17  1005 02/18/17  1045 02/17/17 0609 02/16/17 2119 02/16/17  1716   WBC 10*3/mm3 7.12 8.72 7.73 5.43 5.00   HEMOGLOBIN g/dL 10.4* 10.4* 10.3* 11.1* 10.8*   HEMATOCRIT % 30.9* 29.7* 30.8* 33.2* 31.6*   PLATELETS 10*3/mm3 110* 112* 140* 175 169           Culture Data:    BLOOD CULTURE   Date Value Ref Range Status   02/16/2017 No growth at 2 days  Preliminary   02/16/2017 No growth at 2 days  Preliminary     URINE CULTURE   Date Value Ref Range Status   02/16/2017 >100,000 CFU/mL Escherichia coli (A)  Final     No results found for: RESPCX  No results found for: WOUNDCX  No results found for: STOOLCX  No components found for: BODYFLD    Radiology Data:   Imaging Results (last 24 hours)     Procedure Component Value Units Date/Time    XR Chest 2 View [[id number]] Collected:  02/16/17 1803     Updated:  02/16/17 1810    Narrative:       Patient Name:  LEE STANFORDPatient ID:  [id number]  Ordering:  ELKIN Echavarriaending:  ELKIN TOMLINReferring:  ELKIN TOMLIN------------------------------------------------PROCEDURE:  XR CHEST 2 VIEWS    INDICATION FOR PROCEDURE:  76 years -old patient presents for  evaluation of fever.    COMPARISON:  January 2, 2017.    FINDINGS: XR CHEST two views  reveals the lungs are expanded.  Patient has a right-sided Mediport catheter. The catheter is in  the right inferior vena cava. The tip the catheter is in the  superior vena cava. Cardiac monitoring leads are present.    The thoracic aorta is tortuous with atherosclerotic  calcification. There is mild elevation and eventration of the  right hemidiaphragm. Cardiac silhouette is at the limits of  normal.    There is no radiographic evidence for airspace consolidation,  pleural effusion or pneumothorax. Mediastinal silhouette is  within normal limits. The skeletal structures are within normal  limits.       Impression:       No radiographic evidence of acute cardiopulmonary  disease.    Electronically signed by:  Radha Galeano MD  2/16/2017 6:09 PM CST  Workstation: [employer] have reviewed the patient current medications.     Assessment/Plan     Hospital Problem List     DLBCL (diffuse large B cell lymphoma)    Sepsis    Fever          Plan:   1. Sepsis r/t UTI: Urine  culture reveals E. Coli and blood cultures are negative, continue IV fluids, antibiotics changed to PO in anticipation of discharge tomorrow morning.  Sensitive to Macrobid.  WBC normal.  CRP improved and lactic acid normal.    2. Acute cystitis: cultures reveal E coli with sensitivities to Bactrim.  Abx changed to PO in anticipation of discharge tomorrow.   3. Diffuse large B cell lymphoma: Oncology following.   4. Fever: fever spiked to 103.4 yesterday but is now stable.  Oncology wishes to monitor overnight and if fevers and WBC counts are stable, patient can be discharged tomorrow.  5. HTN: stable.      Discharge Planning: I expect patient to be discharged to home in 1-2 days.    OSEI Munguia   02/19/17   12:46 PM       Electronically signed by OSEI Munguia at 2/19/2017 12:50 PM      Glenda Tinajero MD at 2/19/2017 10:40 PM  Version 1 of 1         Mr. Steiner has no new complaints today  Hi His fever has subsided, with t max at 100.1 today  His BP is stable  Blood cultures remains sterile  Urine culture identified e coli, ,sensitive to bactrim, Azactam and Levaquin    Exam shows soft abdomen, non tender  No skin rashes   oral cavity is clear  Ext no edema    Cbc shows stable counts, again no neutropenia    Impression and plan:  DLBCL testis s/p cycle 2 R-chop, - should be ready to receive cycl 3 by next week, if repeat urine cultures are  Sterile    Fever resolved  D/C vancomycin  Switch to oral Macrobid and plan for discharge if remains stable tomorrow  Stay on Macrobid to prevent relapse during chemotherapy         Electronically signed by Glenda Tinajero MD at 2/20/2017  8:48 AM           Consult Notes (last 72 hours) (Notes from 2/17/2017  8:55 AM through 2/20/2017  8:55 AM)      Conrado Ahumada MD at 2/17/2017  5:45 PM  Version 1 of 1     Consult Orders:    1. 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 Left testis diagnosed in November 2016.  Patient is status post 2 cycles of chemotherapy with R-CHOP.  Last chemotherapy was on February 7, 2017.  Patient was doing relatively well until yesterday morning when wife found that patient is more lethargic than usual and had a fever of 101.7, family also noticed patient is having urinary incontinence and uses was dark color as well as foul-smelling.  Patient was brought to the emergency room where the found his temperature was 102.5 and patient was admitted to hospital for further evaluation and workup.  Hematology oncology has been consulted for recommendations regarding his chemotherapy.  Patient denies any seizure-like activity after last chemotherapy.  Denied any excessive nausea or vomiting with last chemotherapy.  Denies any productive cough or diarrhea.    PAST MEDICAL HISTORY:    Past Medical History   Diagnosis Date   • Cortical senile cataract    • Diabetes mellitus    • Hypertension    • Lymphoma of testis 11/2016   • Seizures        PAST SURGICAL HISTORY:  Past Surgical History   Procedure Laterality Date   • Back surgery     • Orchiectomy         ALLERGIES:    Allergies   Allergen Reactions   • Crestor [Rosuvastatin Calcium]    • Levofloxacin    • Penicillins    • Sulfa Antibiotics    • Tramadol        SOCIAL HISTORY:   Social History   Substance Use Topics   • Smoking status: Former Smoker   • Smokeless tobacco: None   • Alcohol use No       CURRENT MEDICATIONS:    Current Facility-Administered Medications   Medication Dose Route Frequency Provider Last Rate Last Dose   • acetaminophen (TYLENOL) tablet 650 mg  650 mg Oral Q6H PRN Kwame Holbrook MD   650 mg at  02/17/17 1237   • aspirin chewable tablet 81 mg  81 mg Oral Daily Philip Dodson MD   81 mg at 02/17/17 1014   • aspirin tablet 325 mg  325 mg Oral Q6H PRN Conrado Ahumada MD       • atenolol (TENORMIN) tablet 25 mg  25 mg Oral Q12H Philip Dodson MD   25 mg at 02/17/17 1010   • aztreonam (AZACTAM) 2 g/100 mL 0.9% NS (mbp)  2 g Intravenous Q8H Philip Dodson  mL/hr at 02/17/17 0604 2 g at 02/17/17 1354   • doxazosin (CARDURA) tablet 4 mg  4 mg Oral Nightly Philip Dodson MD   4 mg at 02/16/17 2311   • HYDROcodone-acetaminophen (NORCO) 7.5-325 MG per tablet 1 tablet  1 tablet Oral Q4H PRN Philip Dodson MD       • levETIRAcetam (KEPPRA) tablet 500 mg  500 mg Oral BID Philip Dodson MD   500 mg at 02/17/17 1740   • lisinopril (PRINIVIL,ZESTRIL) tablet 10 mg  10 mg Oral Daily Philip Dodson MD   10 mg at 02/17/17 1010   • Pharmacy to dose vancomycin   Does not apply BID Philip Dodson MD       • sodium chloride 0.9 % flush 1-10 mL  1-10 mL Intravenous PRN Philip Dodson MD   10 mL at 02/17/17 0147   • sodium chloride 0.9 % flush 10 mL  10 mL Intravenous PRN Roddy Fernandez MD       • vancomycin (VANCOCIN) 1,500 mg in sodium chloride 0.9 % 500 mL IVPB  1,500 mg Intravenous Q24H Jessie Lazcano MD            HOME MEDICATIONS:   No current facility-administered medications on file prior to encounter.      Current Outpatient Prescriptions on File Prior to Encounter   Medication Sig Dispense Refill   • aspirin 81 MG chewable tablet Chew 81 mg daily.     • atenolol (TENORMIN) 50 MG tablet Take 50 mg by mouth Daily.  3   • doxazosin (CARDURA) 4 MG tablet Take 4 mg by mouth every night.     • fluticasone (FLONASE) 50 MCG/ACT nasal spray 2 sprays into each nostril 2 (Two) Times a Day. Administer 2 sprays in each nostril for each dose.      • HYDROcodone-acetaminophen (NORCO) 7.5-325 MG per tablet Take 1 tablet by mouth Every 6 (Six) Hours As Needed (Takes sparingly).     • levETIRAcetam (KEPPRA) 500 MG tablet  Take 1 tablet by mouth 2 (Two) Times a Day. 28 tablet 0   • lisinopril (PRINIVIL,ZESTRIL) 10 MG tablet Take 20 mg by mouth Daily. Pt now takes 2 (10 mg) tabs = 20 mg  2   • prochlorperazine (COMPAZINE) 10 MG tablet Take 1 tablet by mouth Every 6 (Six) Hours As Needed for nausea or vomiting. 40 tablet 3   • aspirin 325 MG tablet Take 325 mg by mouth As Needed (Takes as needed when ASA 81 mg does not control his vertigo (per pt)).         FAMILY HISTORY:    Family History   Problem Relation Age of Onset   • Diabetes Mother    • Hypertension Sister    • Cancer Sister    • Hypertension Brother    • Cancer Brother        REVIEW OF SYSTEMS:      CONSTITUTIONAL:  Complains of fatigue. Denies any fever, chills or weight loss.     HEENT:  No epistaxis, mouth sores or difficulty swallowing.    RESPIRATORY:  No new shortness of breath. No new cough or hemoptysis.    CARDIOVASCULAR:  No chest pain or palpitations.    GASTROINTESTINAL:  No abdominal pain nausea, vomiting or blood in the stool.    GENITOURINARY: Had foul-smelling urine prior to hospital admission.  Denies any burning or discharge at present.    MUSCULOSKELETAL:  No new back pain or arthralgia..    LYMPHATICS:  Denies any abnormal swollen glands anywhere in the body.    NEUROLOGICAL : Complains of dizziness.  Requesting aspirin 325 mg for that.  No tingling or numbness. No headache . No seizures or balance problems.    SKIN: No new skin lesions.    PHYSICAL EXAMINATION:      VITAL SIGNS:  Temp:  [96.9 °F (36.1 °C)-100.7 °F (38.2 °C)] 98.5 °F (36.9 °C)  Heart Rate:  [62-76] 64  Resp:  [16-18] 16  BP: (138-169)/(64-84) 140/65    GENERAL:  Not in any distress.    HEENT:  Normocephalic, Atraumatic.Eyes  Shows mild pallor. No icterus. Extraocular Movements Intact. No Fascial Asymmetry noted.    NECK:  No adenopathy. NO JVD.    RESPIRATORY:  Fair air entry bilateral. No rhonchi or wheezing.    CARDIOVASCULAR:  S1, S2. Regular rate and rhythm. No murmur or gallop  appreciated.    ABDOMEN:  Soft, obese, nontender. Bowel sounds present in all four quadrants.  No organomegaly appreciated.    EXTREMITIES:  No edema.No Calf Tenderness.    NEUROLOGIC:  Alert, awake and oriented ×3.  No  Motor or sensory deficit appreciated. Cranial Nerves 2-12 grossly intact.        DIAGNOSTIC DATA:    WBC   Date Value Ref Range Status   02/17/2017 7.73 3.20 - 9.80 10*3/mm3 Final     RBC   Date Value Ref Range Status   02/17/2017 3.68 (L) 4.37 - 5.74 10*6/mm3 Final     HEMOGLOBIN   Date Value Ref Range Status   02/17/2017 10.3 (L) 13.7 - 17.3 g/dL Final     HEMATOCRIT   Date Value Ref Range Status   02/17/2017 30.8 (L) 39.0 - 49.0 % Final     MCV   Date Value Ref Range Status   02/17/2017 83.7 80.0 - 98.0 fL Final     MCH   Date Value Ref Range Status   02/17/2017 28.0 26.5 - 34.0 pg Final     MCHC   Date Value Ref Range Status   02/17/2017 33.4 31.5 - 36.3 g/dL Final     RDW   Date Value Ref Range Status   02/17/2017 13.6 11.5 - 14.5 % Final     RDW-SD   Date Value Ref Range Status   02/17/2017 41.1 35.1 - 43.9 fl Final     MPV   Date Value Ref Range Status   02/17/2017 10.9 8.0 - 12.0 fL Final     PLATELETS   Date Value Ref Range Status   02/17/2017 140 (L) 150 - 450 10*3/mm3 Final     NEUTROPHIL %   Date Value Ref Range Status   02/16/2017 70.0 37.0 - 80.0 % Final     LYMPHOCYTE %   Date Value Ref Range Status   02/16/2017 8.5 (L) 10.0 - 50.0 % Final     MONOCYTE %   Date Value Ref Range Status   02/16/2017 13.4 (H) 0.0 - 12.0 % Final     EOSINOPHIL %   Date Value Ref Range Status   02/16/2017 2.0 0.0 - 7.0 % Final     BASOPHIL %   Date Value Ref Range Status   02/16/2017 1.5 0.0 - 2.0 % Final     IMMATURE GRANS %   Date Value Ref Range Status   02/16/2017 4.6 (H) 0.0 - 0.5 % Final     NEUTROPHILS, ABSOLUTE   Date Value Ref Range Status   02/16/2017 3.80 2.00 - 8.60 10*3/mm3 Final     NEUTROPHILS ABSOLUTE   Date Value Ref Range Status   02/17/2017 6.65 2.00 - 8.60 10*3/mm3 Final     LYMPHOCYTES,  ABSOLUTE   Date Value Ref Range Status   02/16/2017 0.46 (L) 0.60 - 4.20 10*3/mm3 Final     MONOCYTES, ABSOLUTE   Date Value Ref Range Status   02/16/2017 0.73 0.00 - 0.90 10*3/mm3 Final     EOSINOPHILS, ABSOLUTE   Date Value Ref Range Status   02/16/2017 0.11 0.00 - 0.70 10*3/mm3 Final     EOSINOPHILS ABSOLUTE   Date Value Ref Range Status   02/17/2017 0.08 0.00 - 0.70 10*3/mm3 Final     BASOPHILS, ABSOLUTE   Date Value Ref Range Status   02/16/2017 0.08 0.00 - 0.20 10*3/mm3 Final     IMMATURE GRANS, ABSOLUTE   Date Value Ref Range Status   02/16/2017 0.25 (H) 0.00 - 0.02 10*3/mm3 Final     GLUCOSE   Date Value Ref Range Status   02/17/2017 97 60 - 100 mg/dL Final     SODIUM   Date Value Ref Range Status   02/17/2017 135 (L) 137 - 145 mmol/L Final     POTASSIUM   Date Value Ref Range Status   02/17/2017 3.9 3.5 - 5.1 mmol/L Final     CO2   Date Value Ref Range Status   02/17/2017 29.0 22.0 - 31.0 mmol/L Final     CHLORIDE   Date Value Ref Range Status   02/17/2017 103 95 - 110 mmol/L Final     ANION GAP   Date Value Ref Range Status   02/17/2017 3.0 (L) 5.0 - 15.0 mmol/L Final     CREATININE   Date Value Ref Range Status   02/17/2017 0.83 0.70 - 1.30 mg/dL Final     BUN   Date Value Ref Range Status   02/17/2017 10 7 - 21 mg/dL Final     BUN/CREATININE RATIO   Date Value Ref Range Status   02/17/2017 12.0 7.0 - 25.0 Final     CALCIUM   Date Value Ref Range Status   02/17/2017 8.1 (L) 8.4 - 10.2 mg/dL Final     EGFR NON  AMER   Date Value Ref Range Status   02/17/2017 90 >60 mL/min/1.73 Final     ALKALINE PHOSPHATASE   Date Value Ref Range Status   02/17/2017 106 38 - 126 U/L Final     TOTAL PROTEIN   Date Value Ref Range Status   02/17/2017 5.2 (L) 6.3 - 8.6 g/dL Final     ALT (SGPT)   Date Value Ref Range Status   02/17/2017 28 21 - 72 U/L Final     AST (SGOT)   Date Value Ref Range Status   02/17/2017 18 17 - 59 U/L Final     TOTAL BILIRUBIN   Date Value Ref Range Status   02/17/2017 0.4 0.2 - 1.3 mg/dL  Final     ALBUMIN   Date Value Ref Range Status   02/17/2017 2.70 (L) 3.40 - 4.80 g/dL Final     GLOBULIN   Date Value Ref Range Status   02/17/2017 2.5 2.3 - 3.5 gm/dL Final     A/G RATIO   Date Value Ref Range Status   02/17/2017 1.1 1.1 - 1.8 g/dL Final     No results found for: IRON, TIBC, LABIRON, FERRITIN, [id number], FOLATE  Lab Results   Component Value Date    AFPTM 2.7 12/30/2016    HCGQUANT 1 12/30/2016     Urine analysis done on February 16, 2017 shows 3+ leukocyte esterase and positive nitrites.  Urine culture is still in progress.    Blood culture is negative for 24 hours.    Radiology Data :  Chest x-ray done in the emergency room on February 16, 2017 showed:  IMPRESSION:  No radiographic evidence of acute cardiopulmonary  disease.        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 Diagnosed in November 2016. Patient had a PET/CT done which was negative for any abnormal activity. All patient could not have a complete staging workup with a lumbar puncture ,CSF analysis secondary to being on aspirin. Patient also did not get intrathecal chemotherapy for the same reason being on aspirin.  Patient is status post 2 cycles of chemotherapy with R-CHOP last of which was on February 7, 2017.  We will follow up as outpatient upon hospital discharge.    2.  Sepsis most likely secondary to urinary source at this point.  Urine analysis shows 3+ leukocyte esterase and positive for nitrites as well.  As per family his urine was foul smelling 4 a day before his presentation to the hospital.  At this point patient remains on vancomycin and aztreonam.  Continue management as per medical team.    3.  Mild anemia and thrombocytopenia: Most likely secondary to chemotherapy plus or minus sepsis.  Recommend transfusing PRBC if hemoglobin is less than 7.5 her platelet is less than 50,000 or if patient is any active bleeding.    4.  History of 2 episode of seizure 3 month apart or on patient was  recently seen by Dr. Hill.  His schedule for MRI of the brain for further evaluation and workup of recurrent seizures.    5.  History of sebaceous carcinoma of left upper eyelid, status post surgery and radiation      Thank you for this consultation.    Conrado Ahumada MD  2/17/2017  5:46 PM       Electronically signed by Conrado Ahumada MD at 2/17/2017  5:56 PM

## 2017-02-20 NOTE — PROGRESS NOTES
Mr. Steiner has no new complaints today  Hi His fever has subsided, with t max at 100.1 today  His BP is stable  Blood cultures remains sterile  Urine culture identified e coli, ,sensitive to bactrim, Azactam and Levaquin    Exam shows soft abdomen, non tender  No skin rashes   oral cavity is clear  Ext no edema    Cbc shows stable counts, again no neutropenia    Impression and plan:  DLBCL testis s/p cycle 2 R-chop, - should be ready to receive cycl 3 by next week, if repeat urine cultures are  Sterile    Fever resolved  D/C vancomycin  Switch to oral Macrobid and plan for discharge if remains stable tomorrow  Stay on Macrobid to prevent relapse during chemotherapy

## 2017-02-20 NOTE — DISCHARGE SUMMARY
[facility] Medicine Services  DISCHARGE SUMMARY       Date of Admission: 2/16/2017  Date of Discharge:  2/20/2017  Primary Care Physician: Mary Ellen Zhou DO    Presenting Problem/History of Present Illness:  Immunocompromised status associated with infection [D84.9, B99.9]  Sepsis, due to unspecified organism [A41.9]  Fever, unspecified fever cause [R50.9]       Final Discharge Diagnoses:  Hospital Problem List     DLBCL (diffuse large B cell lymphoma)          Consults:   Consults     Date and Time Order Name Status Description    2/16/2017 1823 Hematology and Oncology (on-call MD unless specified) Completed     2/16/2017 1823 Hospitalist (on-call MD unless specified)            Procedures Performed:                 Pertinent Test Results:   Lab Results (last 24 hours)     Procedure Component Value Units Date/Time    C-reactive Protein [[id number]]  (Abnormal) Collected:  02/19/17 1005    Specimen:  Blood Updated:  02/19/17 1055     C-Reactive Protein 16.00 (H) mg/dL     CBC Auto Differential [[id number]]  (Abnormal) Collected:  02/19/17 1005    Specimen:  Blood Updated:  02/19/17 1115     WBC 7.12 10*3/mm3      RBC 3.75 (L) 10*6/mm3      Hemoglobin 10.4 (L) g/dL      Hematocrit 30.9 (L) %      MCV 82.4 fL      MCH 27.7 pg      MCHC 33.7 g/dL      RDW 13.7 %      RDW-SD 41.3 fl      MPV 9.8 fL      Platelets 110 (L) 10*3/mm3     Narrative:       SLIDE READ 02/18/2017    CBC & Differential [[id number]] Collected:  02/19/17 1005    Specimen:  Blood Updated:  02/19/17 1117    Narrative:       The following orders were created for panel order CBC & Differential.  Procedure                               Abnormality         Status                     ---------                               -----------         ------                     Manual Differential[[id number]]                                                          Scan Slide[[id number]]                                                                    CBC Auto Differential[[id number]]         Abnormal            Final result                 Please view results for these tests on the individual orders.    Blood Culture [[id number]]  (Normal) Collected:  02/16/17 1716    Specimen:  Blood from Arm, Left Updated:  02/19/17 1801     Blood Culture No growth at 3 days     Blood Culture [[id number]]  (Normal) Collected:  02/16/17 1816    Specimen:  Blood from Hand, Left Updated:  02/19/17 1901     Blood Culture No growth at 3 days     Basic Metabolic Panel [[id number]]  (Abnormal) Collected:  02/20/17 0548    Specimen:  Blood Updated:  02/20/17 0654     Glucose 84 mg/dL      BUN 12 mg/dL      Creatinine 0.72 mg/dL      Sodium 137 mmol/L      Potassium 3.8 mmol/L      Chloride 103 mmol/L      CO2 24.0 mmol/L      Calcium 8.3 (L) mg/dL      eGFR Non African Amer 106 (H) mL/min/1.73      BUN/Creatinine Ratio 16.7      Anion Gap 10.0 mmol/L     Narrative:       The MDRD GFR formula is only valid for adults with stable renal function between ages 18 and 70.    CBC & Differential [[id number]] Collected:  02/20/17 0548    Specimen:  Blood Updated:  02/20/17 0656    Narrative:       The following orders were created for panel order CBC & Differential.  Procedure                               Abnormality         Status                     ---------                               -----------         ------                     Scan Slide[[id number]]                                                                   CBC Auto Differential[[id number]]         Abnormal            Final result                 Please view results for these tests on the individual orders.    CBC Auto Differential [[id number]]  (Abnormal) Collected:  02/20/17 0548    Specimen:  Blood Updated:  02/20/17 0656     WBC 7.34 10*3/mm3      RBC 3.75 (L) 10*6/mm3      Hemoglobin 10.5 (L) g/dL      Hematocrit 30.7 (L) %      MCV 81.9 fL      MCH 28.0 pg      MCHC 34.2 g/dL      RDW 13.9 %      RDW-SD 42.0 fl      " MPV 10.6 fL      Platelets 120 (L) 10*3/mm3      Neutrophil % 78.1 %      Lymphocyte % 5.9 (L) %      Monocyte % 5.4 %      Eosinophil % 1.1 %      Basophil % 0.5 %      Immature Grans % 9.0 (C) %      Neutrophils, Absolute 5.73 10*3/mm3      Lymphocytes, Absolute 0.43 (L) 10*3/mm3      Monocytes, Absolute 0.40 10*3/mm3      Eosinophils, Absolute 0.08 10*3/mm3      Basophils, Absolute 0.04 10*3/mm3      Immature Grans, Absolute 0.66 (H) 10*3/mm3               Chief Complaint on Day of Discharge: none    Hospital Course:  The patient is a 76 y.o. male who presented to [facility] with fever.  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  He was pan-cultured and urine cultures revealed E coli cystitis.  He was treated with IV Aztreonam during the hospital stay, however he was de-escalated to Macrobid by mouth when sensitivities were revealed.  He was also tested for influenza which was negative.  Dr. Ahumada and Dr. Alba with oncology followed the patient during the hospital stay.  Patient has been afebrile for 24 hours other than one episode of temperature of 100.6 last night.  The case has been discussed with Dr. Ahumada and he is agreeable to patient being discharged, however, he wants to delay continuation of chemotherapy until March 1st.  Patient will be discharged home today in stable condition.  Home health has been arranged due to patient's recent diagnosis of sepsis to follow his vital signs and status post discharge.      Condition on Discharge:  stable    Physical Exam on Discharge:  Visit Vitals   • /62 (BP Location: Right arm, Patient Position: Lying)   • Pulse 61   • Temp 100 °F (37.8 °C) (Oral)   • Resp 16   • Ht 66\" (167.6 cm)   • Wt 192 lb 6.4 oz (87.3 kg)   • SpO2 91%   • BMI 31.05 kg/m2     Physical Exam   Constitutional: He is oriented to person, place, and time. He appears well-developed and well-nourished.   HENT: "   Head: Normocephalic and atraumatic.   Eyes: EOM are normal. Pupils are equal, round, and reactive to light.   Neck: Normal range of motion. Neck supple.   Cardiovascular: Normal rate, regular rhythm, normal heart sounds and intact distal pulses.  Exam reveals no gallop and no friction rub.    No murmur heard.  Pulmonary/Chest: Effort normal and breath sounds normal. No respiratory distress. He has no wheezes. He has no rales. He exhibits no tenderness.   Abdominal: Soft. Bowel sounds are normal.   Musculoskeletal: Normal range of motion.   Neurological: He is alert and oriented to person, place, and time.   Skin: Skin is warm and dry.   Psychiatric: He has a normal mood and affect. His behavior is normal. Judgment and thought content normal.         Discharge Disposition:  Home-Health Care [facility]    Discharge Medications:   Sharan Steiner   Home Medication Instructions FRENCH:[id number]    Printed on:02/20/17 104   Medication Information                      aspirin 325 MG tablet  Take 325 mg by mouth As Needed (Takes as needed when ASA 81 mg does not control his vertigo (per pt)).             aspirin 81 MG chewable tablet  Chew 81 mg daily.             atenolol (TENORMIN) 50 MG tablet  Take 50 mg by mouth Daily.             doxazosin (CARDURA) 4 MG tablet  Take 4 mg by mouth every night.             fluticasone (FLONASE) 50 MCG/ACT nasal spray  2 sprays into each nostril 2 (Two) Times a Day. Administer 2 sprays in each nostril for each dose.              HYDROcodone-acetaminophen (NORCO) 7.5-325 MG per tablet  Take 1 tablet by mouth Every 6 (Six) Hours As Needed (Takes sparingly).             levETIRAcetam (KEPPRA) 500 MG tablet  Take 1 tablet by mouth 2 (Two) Times a Day.             lisinopril (PRINIVIL,ZESTRIL) 10 MG tablet  Take 20 mg by mouth Daily. Pt now takes 2 (10 mg) tabs = 20 mg             prochlorperazine (COMPAZINE) 10 MG tablet  Take 1 tablet by mouth Every 6 (Six) Hours As Needed for nausea or  vomiting.                 Discharge Diet:   Diet Instructions     Diet: Cardiac; Thin Liquids, No Restrictions       Discharge Diet:  Cardiac   Fluid Consistency:  Thin Liquids, No Restrictions                 Activity at Discharge:   Activity Instructions     Activity as Tolerated                     Discharge Care Plan/Instructions: See PCP, Dr. Zhou, in one week and Dr. Ahumada on 3-1-2017.    Follow-up Appointments:   Future Appointments  Date Time Provider Department Center   2/22/2017 10:45 AM [facility] MRI 1  MAD MRI [facility]   2/28/2017 8:00 AM NURSE [facility] MAD OPI [facility]   2/28/2017 8:30 AM Conrado Ahumada MD MG ONC [facility]   2/28/2017 9:00 AM [facility] OP INFU CHAIR 5 [facility] OPI [facility]   3/8/2017 9:30 AM Phuc Alexandre MD MG OPH MAD None   9/14/2017 9:45 AM García De Souza MD MG ALISIA MAD None       Test Results Pending at Discharge:  Order Current Status    Blood Culture Preliminary result    Blood Culture Preliminary result          Rosey Sr, OSEI  02/20/17  10:41 AM

## 2017-02-20 NOTE — PLAN OF CARE
Problem: Patient Care Overview (Adult)  Goal: Plan of Care Review  Outcome: Ongoing (interventions implemented as appropriate)    02/20/17 0543   Coping/Psychosocial Response Interventions   Plan Of Care Reviewed With patient   Patient Care Overview   Progress no change   Outcome Evaluation   Outcome Summary/Follow up Plan Pt was febrile during the night (fever 100.6); vital signs stable at this time; will continue to monitor       Goal: Adult Individualization and Mutuality  Outcome: Ongoing (interventions implemented as appropriate)    Problem: Fall Risk (Adult)  Goal: Absence of Falls  Outcome: Ongoing (interventions implemented as appropriate)    Problem: Chemotherapy Effects (Adult)  Goal: Signs and Symptoms of Listed Potential Problems Will be Absent or Manageable (Chemotherapy Effects)  Outcome: Ongoing (interventions implemented as appropriate)

## 2017-02-20 NOTE — PLAN OF CARE
Problem: Patient Care Overview (Adult)  Goal: Plan of Care Review  Outcome: Ongoing (interventions implemented as appropriate)  Goal: Adult Individualization and Mutuality  Outcome: Ongoing (interventions implemented as appropriate)  Goal: Discharge Needs Assessment  Outcome: Ongoing (interventions implemented as appropriate)    Problem: Fall Risk (Adult)  Goal: Absence of Falls  Outcome: Ongoing (interventions implemented as appropriate)    Problem: Chemotherapy Effects (Adult)  Goal: Signs and Symptoms of Listed Potential Problems Will be Absent or Manageable (Chemotherapy Effects)  Outcome: Ongoing (interventions implemented as appropriate)

## 2017-02-20 NOTE — PLAN OF CARE
Problem: Patient Care Overview (Adult)  Goal: Discharge Needs Assessment  Outcome: Outcome(s) achieved Date Met:  02/20/17

## 2017-02-20 NOTE — PLAN OF CARE
Problem: Patient Care Overview (Adult)  Goal: Plan of Care Review  Outcome: Outcome(s) achieved Date Met:  02/20/17  Pt being d'c'd home. OSEI Currie aware of pt's last vital signs.    Problem: Fall Risk (Adult)  Goal: Absence of Falls  Outcome: Outcome(s) achieved Date Met:  02/20/17    Problem: Chemotherapy Effects (Adult)  Goal: Signs and Symptoms of Listed Potential Problems Will be Absent or Manageable (Chemotherapy Effects)  Outcome: Ongoing (interventions implemented as appropriate)

## 2017-02-21 ENCOUNTER — LAB REQUISITION (OUTPATIENT)
Dept: LAB | Facility: HOSPITAL | Age: 77
End: 2017-02-21

## 2017-02-21 DIAGNOSIS — E13.9 OTHER SPECIFIED DIABETES MELLITUS WITHOUT COMPLICATIONS (HCC): ICD-10-CM

## 2017-02-21 LAB
BACTERIA SPEC AEROBE CULT: NORMAL
BACTERIA SPEC AEROBE CULT: NORMAL
HBA1C MFR BLD: 6.11 % (ref 4–5.6)

## 2017-02-21 PROCEDURE — 85007 BL SMEAR W/DIFF WBC COUNT: CPT | Performed by: INTERNAL MEDICINE

## 2017-02-21 PROCEDURE — 83036 HEMOGLOBIN GLYCOSYLATED A1C: CPT | Performed by: FAMILY MEDICINE

## 2017-02-21 PROCEDURE — 85025 COMPLETE CBC W/AUTO DIFF WBC: CPT | Performed by: INTERNAL MEDICINE

## 2017-02-22 ENCOUNTER — HOSPITAL ENCOUNTER (OUTPATIENT)
Dept: MRI IMAGING | Facility: HOSPITAL | Age: 77
Discharge: HOME | End: 2017-02-22
Admitting: PSYCHIATRY & NEUROLOGY

## 2017-02-22 DIAGNOSIS — G40.109 SPECIAL SENSORY ATTACKS (HCC): ICD-10-CM

## 2017-02-22 PROCEDURE — 25010000002 GADOTERIDOL PER 1 ML: Performed by: PSYCHIATRY & NEUROLOGY

## 2017-02-22 PROCEDURE — A9576 INJ PROHANCE MULTIPACK: HCPCS | Performed by: PSYCHIATRY & NEUROLOGY

## 2017-02-22 PROCEDURE — 70553 MRI BRAIN STEM W/O & W/DYE: CPT

## 2017-02-22 RX ADMIN — GADOTERIDOL 19 ML: 279.3 INJECTION, SOLUTION INTRAVENOUS at 11:34

## 2017-02-28 ENCOUNTER — APPOINTMENT (OUTPATIENT)
Dept: ONCOLOGY | Facility: HOSPITAL | Age: 77
End: 2017-02-28

## 2017-03-01 ENCOUNTER — OFFICE VISIT (OUTPATIENT)
Dept: ONCOLOGY | Facility: CLINIC | Age: 77
End: 2017-03-01

## 2017-03-01 ENCOUNTER — APPOINTMENT (OUTPATIENT)
Dept: ONCOLOGY | Facility: HOSPITAL | Age: 77
End: 2017-03-01

## 2017-03-01 ENCOUNTER — INFUSION (OUTPATIENT)
Dept: ONCOLOGY | Facility: HOSPITAL | Age: 77
End: 2017-03-01

## 2017-03-01 VITALS
TEMPERATURE: 96.6 F | SYSTOLIC BLOOD PRESSURE: 137 MMHG | DIASTOLIC BLOOD PRESSURE: 73 MMHG | HEART RATE: 52 BPM | BODY MASS INDEX: 27.23 KG/M2 | RESPIRATION RATE: 18 BRPM | WEIGHT: 184.4 LBS

## 2017-03-01 DIAGNOSIS — C83.30 DLBCL (DIFFUSE LARGE B CELL LYMPHOMA) (HCC): ICD-10-CM

## 2017-03-01 DIAGNOSIS — Z45.2 ENCOUNTER FOR VENOUS ACCESS DEVICE CARE: Primary | ICD-10-CM

## 2017-03-01 LAB
ALBUMIN SERPL-MCNC: 3.4 G/DL (ref 3.4–4.8)
ALBUMIN/GLOB SERPL: 1.3 G/DL (ref 1.1–1.8)
ALP SERPL-CCNC: 98 U/L (ref 38–126)
ALT SERPL W P-5'-P-CCNC: 37 U/L (ref 21–72)
ANION GAP SERPL CALCULATED.3IONS-SCNC: 9 MMOL/L (ref 5–15)
AST SERPL-CCNC: 33 U/L (ref 17–59)
BASOPHILS # BLD AUTO: 0.08 10*3/MM3 (ref 0–0.2)
BASOPHILS NFR BLD AUTO: 1.6 % (ref 0–2)
BILIRUB SERPL-MCNC: 0.3 MG/DL (ref 0.2–1.3)
BUN BLD-MCNC: 8 MG/DL (ref 7–21)
BUN/CREAT SERPL: 10.5 (ref 7–25)
CALCIUM SPEC-SCNC: 8.7 MG/DL (ref 8.4–10.2)
CHLORIDE SERPL-SCNC: 104 MMOL/L (ref 95–110)
CO2 SERPL-SCNC: 24 MMOL/L (ref 22–31)
CREAT BLD-MCNC: 0.76 MG/DL (ref 0.7–1.3)
DEPRECATED RDW RBC AUTO: 43.2 FL (ref 35.1–43.9)
EOSINOPHIL # BLD AUTO: 0.08 10*3/MM3 (ref 0–0.7)
EOSINOPHIL NFR BLD AUTO: 1.6 % (ref 0–7)
ERYTHROCYTE [DISTWIDTH] IN BLOOD BY AUTOMATED COUNT: 14.4 % (ref 11.5–14.5)
GFR SERPL CREATININE-BSD FRML MDRD: 99 ML/MIN/1.73 (ref 42–98)
GLOBULIN UR ELPH-MCNC: 2.7 GM/DL (ref 2.3–3.5)
GLUCOSE BLD-MCNC: 84 MG/DL (ref 60–100)
HCT VFR BLD AUTO: 33.3 % (ref 39–49)
HGB BLD-MCNC: 11.5 G/DL (ref 13.7–17.3)
IMM GRANULOCYTES # BLD: 0.17 10*3/MM3 (ref 0–0.02)
IMM GRANULOCYTES NFR BLD: 3.4 % (ref 0–0.5)
LYMPHOCYTES # BLD AUTO: 1.2 10*3/MM3 (ref 0.6–4.2)
LYMPHOCYTES NFR BLD AUTO: 23.9 % (ref 10–50)
MCH RBC QN AUTO: 28.6 PG (ref 26.5–34)
MCHC RBC AUTO-ENTMCNC: 34.5 G/DL (ref 31.5–36.3)
MCV RBC AUTO: 82.8 FL (ref 80–98)
MONOCYTES # BLD AUTO: 0.46 10*3/MM3 (ref 0–0.9)
MONOCYTES NFR BLD AUTO: 9.1 % (ref 0–12)
NEUTROPHILS # BLD AUTO: 3.04 10*3/MM3 (ref 2–8.6)
NEUTROPHILS NFR BLD AUTO: 60.4 % (ref 37–80)
PLATELET # BLD AUTO: 400 10*3/MM3 (ref 150–450)
PMV BLD AUTO: 9.3 FL (ref 8–12)
POTASSIUM BLD-SCNC: 4 MMOL/L (ref 3.5–5.1)
PROT SERPL-MCNC: 6.1 G/DL (ref 6.3–8.6)
RBC # BLD AUTO: 4.02 10*6/MM3 (ref 4.37–5.74)
SODIUM BLD-SCNC: 137 MMOL/L (ref 137–145)
WBC NRBC COR # BLD: 5.03 10*3/MM3 (ref 3.2–9.8)

## 2017-03-01 PROCEDURE — 99214 OFFICE O/P EST MOD 30 MIN: CPT | Performed by: INTERNAL MEDICINE

## 2017-03-01 PROCEDURE — 25010000002 HEPARIN FLUSH (PORCINE) 100 UNIT/ML SOLUTION: Performed by: INTERNAL MEDICINE

## 2017-03-01 PROCEDURE — 85025 COMPLETE CBC W/AUTO DIFF WBC: CPT | Performed by: INTERNAL MEDICINE

## 2017-03-01 PROCEDURE — 36591 DRAW BLOOD OFF VENOUS DEVICE: CPT | Performed by: INTERNAL MEDICINE

## 2017-03-01 PROCEDURE — 80053 COMPREHEN METABOLIC PANEL: CPT | Performed by: INTERNAL MEDICINE

## 2017-03-01 RX ORDER — PALONOSETRON 0.05 MG/ML
0.25 INJECTION, SOLUTION INTRAVENOUS ONCE
Status: CANCELLED | OUTPATIENT
Start: 2017-03-09

## 2017-03-01 RX ORDER — SODIUM CHLORIDE 0.9 % (FLUSH) 0.9 %
10 SYRINGE (ML) INJECTION AS NEEDED
Status: CANCELLED | OUTPATIENT
Start: 2017-03-01

## 2017-03-01 RX ORDER — SODIUM CHLORIDE 0.9 % (FLUSH) 0.9 %
10 SYRINGE (ML) INJECTION AS NEEDED
Status: DISCONTINUED | OUTPATIENT
Start: 2017-03-01 | End: 2017-03-01 | Stop reason: HOSPADM

## 2017-03-01 RX ORDER — ACETAMINOPHEN 325 MG/1
650 TABLET ORAL ONCE
Status: CANCELLED | OUTPATIENT
Start: 2017-03-09

## 2017-03-01 RX ADMIN — SODIUM CHLORIDE, PRESERVATIVE FREE 500 UNITS: 5 INJECTION INTRAVENOUS at 09:48

## 2017-03-01 RX ADMIN — Medication 10 ML: at 08:35

## 2017-03-01 NOTE — PROGRESS NOTES
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  He has so far received 2 cycles of R-CHOP last of which was on February 7, 2017.  Patient was admitted to the [facility] on February 16, 2017 with fever and urinary tract infection.  He finished his antibiotic yesterday.  He is here to resume his third cycle of chemotherapy today.  He is still complaining of generalized weakness and requesting another week off from chemotherapy today.  Denies any fever or chills.  Denies any headache or seizure like activity.  Complains of postnasal discharge.  Denies any burning with urination or foul-smelling urine.      PAST MEDICAL HISTORY:    Past Medical History   Diagnosis Date   • Cortical senile cataract    • Diabetes mellitus    • Hypertension    • Lymphoma of testis 11/2016   • Seizures        SOCIAL HISTORY:    Social History   Substance Use Topics   • Smoking status: Former Smoker   • Smokeless tobacco: None   • Alcohol use No       Surgical History :  Past Surgical History   Procedure Laterality Date   • Back surgery     • Orchiectomy         ALLERGIES:    Allergies   Allergen Reactions   • Crestor [Rosuvastatin Calcium]    • Levofloxacin    • Penicillins    • Sulfa Antibiotics    • Tramadol        REVIEW OF SYSTEMS:      CONSTITUTIONAL: Positive for fatigue.  Complains of generalized weakness  No fever, chills, or night sweats.     HEENT:  No epistaxis, mouth sores, or difficulty swallowing.    RESPIRATORY:  No new shortness of breath or cough at present.    CARDIOVASCULAR:  No chest pain or palpitations.    GASTROINTESTINAL:   Denies any nausea or vomiting today.  No abdominal pain or blood in the stool.    GENITOURINARY:  No dysuria or hematuria.    MUSCULOSKELETAL:  No any new back pain or arthralgias.      NEUROLOGICAL: Had second episode of seizure on February 2, 2017.  Denies any seizure like activity after visiting years on February 2, 2017.  No tingling or numbness. No new headache or dizziness.     LYMPHATICS:  Denies any abnormal swollen and anywhere in the body.    SKIN:  Denies any new skin rash.    PHYSICAL EXAMINATION:      VITAL SIGNS:    Visit Vitals   • /73   • Pulse 52   • Temp 96.6 °F (35.9 °C)   • Resp 18   • Wt 184 lb 6.4 oz (83.6 kg)   • BMI 27.23 kg/m2       GENERAL:  Not in any distress.     EYES:  Mild pallor. No icterus.    NECK:  No adenopathy in cervical or supraclavicular area.    RESPIRATORY:  Fair air entry bilaterally. No rhonchi or wheezing.    CARDIOVASCULAR:  S1, S2. Regular rate and rhythm.    ABDOMEN:  Soft, nontender. Bowel sounds present.    EXTREMITIES:  No edema.  No Calf tenderness.    NEUROLOGICAL:  Alert, awake, oriented x3. No motor or sensory deficits.    DIAGNOSTIC DATA:    GLUCOSE   Date Value Ref Range Status   03/01/2017 84 60 - 100 mg/dL Final   01/17/2017 92 60 - 100 mg/dl Final     SODIUM   Date Value Ref Range Status   03/01/2017 137 137 - 145 mmol/L Final   01/17/2017 139 137 - 145 mmol/L Final     POTASSIUM   Date Value Ref Range Status   03/01/2017 4.0 3.5 - 5.1 mmol/L Final   01/17/2017 4.4 3.5 - 5.1 mmol/L Final     CO2   Date Value Ref Range Status   03/01/2017 24.0 22.0 - 31.0 mmol/L Final   01/17/2017 27 22 - 31 mmol/L Final     CHLORIDE   Date Value Ref Range Status   03/01/2017 104 95 - 110 mmol/L Final   01/17/2017 102 95 - 110 mmol/L Final     ANION GAP   Date Value Ref Range Status   03/01/2017 9.0 5.0 - 15.0 mmol/L Final   01/17/2017 10.0 5.0 - 15.0 mmol/L Final     CREATININE   Date Value Ref Range Status   03/01/2017 0.76 0.70 - 1.30 mg/dL Final   01/17/2017 0.9 0.7 - 1.3 mg/dl Final     BUN   Date Value Ref Range Status   03/01/2017 8 7 - 21 mg/dL Final   01/17/2017 13 7 - 21 mg/dl Final     BUN/CREATININE RATIO   Date Value Ref Range Status    03/01/2017 10.5 7.0 - 25.0 Final     CALCIUM   Date Value Ref Range Status   03/01/2017 8.7 8.4 - 10.2 mg/dL Final   01/17/2017 8.8 8.4 - 10.2 mg/dl Final     EGFR NON  AMER   Date Value Ref Range Status   03/01/2017 99 (H) 42 - 98 mL/min/1.73 Final     ALKALINE PHOSPHATASE   Date Value Ref Range Status   03/01/2017 98 38 - 126 U/L Final   01/17/2017 88 38 - 126 U/L Final     TOTAL PROTEIN   Date Value Ref Range Status   03/01/2017 6.1 (L) 6.3 - 8.6 g/dL Final   01/17/2017 6.7 6.3 - 8.6 gm/dl Final     ALT (SGPT)   Date Value Ref Range Status   03/01/2017 37 21 - 72 U/L Final   01/17/2017 31 21 - 72 U/L Final     AST (SGOT)   Date Value Ref Range Status   03/01/2017 33 17 - 59 U/L Final   01/17/2017 30 17 - 59 U/L Final     TOTAL BILIRUBIN   Date Value Ref Range Status   03/01/2017 0.3 0.2 - 1.3 mg/dL Final   01/17/2017 0.5 0.2 - 1.3 mg/dl Final     ALBUMIN   Date Value Ref Range Status   03/01/2017 3.40 3.40 - 4.80 g/dL Final   01/17/2017 3.7 3.4 - 4.8 gm/dl Final     GLOBULIN   Date Value Ref Range Status   03/01/2017 2.7 2.3 - 3.5 gm/dL Final     A/G RATIO   Date Value Ref Range Status   03/01/2017 1.3 1.1 - 1.8 g/dL Final     Lab Results   Component Value Date    WBC 5.03 03/01/2017    HGB 11.5 (L) 03/01/2017    HCT 33.3 (L) 03/01/2017    MCV 82.8 03/01/2017     03/01/2017     Lab Results   Component Value Date    NEUTROABS 3.04 03/01/2017     Lab Results   Component Value Date    AFPTM 2.7 12/30/2016    HCGQUANT 1 12/30/2016   ]    PATHOLOGY:  Pathology report from November 2016 shows:  FINAL DIAGNOSIS:  LEFT TESTIS:       MALIGNANT LYMPHOMA, DIFFUSE LARGE B-CELL.    COMMENT:  The following special stains were performed on block A5:  AE1-AE3  negative, GAL negative, LCA positive and CD20 positive.    RADIOLOGY DATA :  PET CT from January 2, 2017 showed:  CONCLUSION-  1. Focus of intense activity associated with a 2.2 cm round structure  in the region of the ileocecal valve, apparently  contiguous with the  distal ileum, most likely representing a physiologically active loop  of bowel. However given its focal appearance on both PET and CT  imaging, neoplasm should also be considered. This could be further  evaluated with a small bowel follow-through or a dedicated CT of the  abdomen and pelvis with oral and IV contrast.  2. Focal activity (SUV max 5.0) in the left inguinal canal may  represent a neoplastic, postoperative, inflammatory, or infectious  etiology.  3. Solitary sclerotic focus in the posterior right seventh rib  measuring 5 mm, not associated with hypermetabolic activity, most  likely a bone island      CT of head without contrast done on February 2, 2016 showed:   IMPRESSION:  1. No acute intracranial abnormality.  2. Pansinusitis.       ASSESSMENT AND PLAN:      1.  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  Diagnosed in November 2016.  Patient had a PET/CT done which was negative for any abnormal activity.  All patient could not have a complete staging workup with a lumbar puncture ,CSF analysis secondary to being on aspirin.  Patient also did not get intrathecal chemotherapy for the  same reason being on aspirin.  Patient has so far received 2 doses of R-CHOP last of which was on February 7, 2017.  At this point  and just finished his antibiotic for Escherichia coli urinary tract infection day before yesterday.  He still complaining of generalized weakness.  We will hold his chemotherapy this week and resume it on March 9, 2017.    2.  Second seizure episode on February 2, 2017.  Initially in November 2016 after diagnosis patient was on trauma done and developed seizure activity, for which patient was evaluated at [facility] in [place].  At that point evaluation was negative for any CNS cause and was believed was secondary from tramadol.   Was seen by Dr. loyola for seizures.  Had MRI done earlier this week which was negative for any intracranial abnormality.  Patient  was encouraged to keep appointment with Dr. loyola.    3.  Escherichia coli urinary tract infection: Patient is status post antibiotic which was completed day before yesterday.  Denies any burning with urination or foul-smelling getting at this point.    4.  History of sebaceous carcinoma of left upper eyelid, status post surgery and radiation    5.  Health maintenance: Patient does not smoke.  Had a colonoscopy done on January 13, 2017 was negative for any malignancy.  He remains full code.    6.  Prescriptions: Patient is enough prescription for Compazine.       Conrado Ahumada MD  3/1/2017  9:43 AM

## 2017-03-09 ENCOUNTER — INFUSION (OUTPATIENT)
Dept: ONCOLOGY | Facility: HOSPITAL | Age: 77
End: 2017-03-09

## 2017-03-09 VITALS
DIASTOLIC BLOOD PRESSURE: 70 MMHG | TEMPERATURE: 96.6 F | SYSTOLIC BLOOD PRESSURE: 135 MMHG | HEART RATE: 55 BPM | RESPIRATION RATE: 18 BRPM

## 2017-03-09 DIAGNOSIS — Z45.2 ENCOUNTER FOR VENOUS ACCESS DEVICE CARE: ICD-10-CM

## 2017-03-09 DIAGNOSIS — C83.30 DLBCL (DIFFUSE LARGE B CELL LYMPHOMA) (HCC): ICD-10-CM

## 2017-03-09 DIAGNOSIS — C83.30 DLBCL (DIFFUSE LARGE B CELL LYMPHOMA) (HCC): Primary | ICD-10-CM

## 2017-03-09 LAB
ALBUMIN SERPL-MCNC: 3.7 G/DL (ref 3.4–4.8)
ALBUMIN/GLOB SERPL: 1.5 G/DL (ref 1.1–1.8)
ALP SERPL-CCNC: 90 U/L (ref 38–126)
ALT SERPL W P-5'-P-CCNC: 34 U/L (ref 21–72)
ANION GAP SERPL CALCULATED.3IONS-SCNC: 9 MMOL/L (ref 5–15)
AST SERPL-CCNC: 30 U/L (ref 17–59)
BASOPHILS # BLD AUTO: 0.13 10*3/MM3 (ref 0–0.2)
BASOPHILS NFR BLD AUTO: 2.1 % (ref 0–2)
BILIRUB SERPL-MCNC: 0.4 MG/DL (ref 0.2–1.3)
BUN BLD-MCNC: 9 MG/DL (ref 7–21)
BUN/CREAT SERPL: 11.7 (ref 7–25)
CALCIUM SPEC-SCNC: 9.1 MG/DL (ref 8.4–10.2)
CHLORIDE SERPL-SCNC: 104 MMOL/L (ref 95–110)
CO2 SERPL-SCNC: 26 MMOL/L (ref 22–31)
CREAT BLD-MCNC: 0.77 MG/DL (ref 0.7–1.3)
DEPRECATED RDW RBC AUTO: 43.9 FL (ref 35.1–43.9)
EOSINOPHIL # BLD AUTO: 0.33 10*3/MM3 (ref 0–0.7)
EOSINOPHIL NFR BLD AUTO: 5.2 % (ref 0–7)
ERYTHROCYTE [DISTWIDTH] IN BLOOD BY AUTOMATED COUNT: 14.3 % (ref 11.5–14.5)
GFR SERPL CREATININE-BSD FRML MDRD: 98 ML/MIN/1.73 (ref 42–98)
GLOBULIN UR ELPH-MCNC: 2.5 GM/DL (ref 2.3–3.5)
GLUCOSE BLD-MCNC: 86 MG/DL (ref 60–100)
HCT VFR BLD AUTO: 35.3 % (ref 39–49)
HGB BLD-MCNC: 11.8 G/DL (ref 13.7–17.3)
IMM GRANULOCYTES # BLD: 0.05 10*3/MM3 (ref 0–0.02)
IMM GRANULOCYTES NFR BLD: 0.8 % (ref 0–0.5)
LYMPHOCYTES # BLD AUTO: 0.61 10*3/MM3 (ref 0.6–4.2)
LYMPHOCYTES NFR BLD AUTO: 9.7 % (ref 10–50)
MCH RBC QN AUTO: 28.1 PG (ref 26.5–34)
MCHC RBC AUTO-ENTMCNC: 33.4 G/DL (ref 31.5–36.3)
MCV RBC AUTO: 84 FL (ref 80–98)
MONOCYTES # BLD AUTO: 0.83 10*3/MM3 (ref 0–0.9)
MONOCYTES NFR BLD AUTO: 13.2 % (ref 0–12)
NEUTROPHILS # BLD AUTO: 4.34 10*3/MM3 (ref 2–8.6)
NEUTROPHILS NFR BLD AUTO: 69 % (ref 37–80)
PLATELET # BLD AUTO: 344 10*3/MM3 (ref 150–450)
PMV BLD AUTO: 10.7 FL (ref 8–12)
POTASSIUM BLD-SCNC: 4.3 MMOL/L (ref 3.5–5.1)
PROT SERPL-MCNC: 6.2 G/DL (ref 6.3–8.6)
RBC # BLD AUTO: 4.2 10*6/MM3 (ref 4.37–5.74)
SODIUM BLD-SCNC: 139 MMOL/L (ref 137–145)
WBC NRBC COR # BLD: 6.29 10*3/MM3 (ref 3.2–9.8)

## 2017-03-09 PROCEDURE — 25010000002 CYCLOPHOSPHAMIDE PER 100 MG: Performed by: INTERNAL MEDICINE

## 2017-03-09 PROCEDURE — 96367 TX/PROPH/DG ADDL SEQ IV INF: CPT | Performed by: INTERNAL MEDICINE

## 2017-03-09 PROCEDURE — 25010000002 DOXORUBICIN PER 10 MG: Performed by: INTERNAL MEDICINE

## 2017-03-09 PROCEDURE — 25010000003 DEXAMETHASONE SODIUM PHOSPHATE 100 MG/10ML SOLUTION 10 ML VIAL: Performed by: INTERNAL MEDICINE

## 2017-03-09 PROCEDURE — 96415 CHEMO IV INFUSION ADDL HR: CPT | Performed by: INTERNAL MEDICINE

## 2017-03-09 PROCEDURE — 25010000002 PALONOSETRON PER 25 MCG: Performed by: INTERNAL MEDICINE

## 2017-03-09 PROCEDURE — 25010000002 HEPARIN FLUSH (PORCINE) 100 UNIT/ML SOLUTION: Performed by: INTERNAL MEDICINE

## 2017-03-09 PROCEDURE — 96411 CHEMO IV PUSH ADDL DRUG: CPT | Performed by: INTERNAL MEDICINE

## 2017-03-09 PROCEDURE — 25010000002 RITUXIMAB 100 MG/10ML SOLUTION 10 ML VIAL: Performed by: INTERNAL MEDICINE

## 2017-03-09 PROCEDURE — 25010000002 RITUXIMAB 500 MG/50ML SOLUTION 50 ML VIAL: Performed by: INTERNAL MEDICINE

## 2017-03-09 PROCEDURE — 96375 TX/PRO/DX INJ NEW DRUG ADDON: CPT | Performed by: INTERNAL MEDICINE

## 2017-03-09 PROCEDURE — 96413 CHEMO IV INFUSION 1 HR: CPT | Performed by: INTERNAL MEDICINE

## 2017-03-09 PROCEDURE — 25010000002 VINCRISTINE PER 1 MG: Performed by: INTERNAL MEDICINE

## 2017-03-09 PROCEDURE — 25010000002 DIPHENHYDRAMINE PER 50 MG: Performed by: INTERNAL MEDICINE

## 2017-03-09 PROCEDURE — 36415 COLL VENOUS BLD VENIPUNCTURE: CPT | Performed by: INTERNAL MEDICINE

## 2017-03-09 PROCEDURE — 96417 CHEMO IV INFUS EACH ADDL SEQ: CPT | Performed by: INTERNAL MEDICINE

## 2017-03-09 RX ORDER — ACETAMINOPHEN 325 MG/1
650 TABLET ORAL ONCE
Status: CANCELLED | OUTPATIENT
Start: 2017-03-30

## 2017-03-09 RX ORDER — SODIUM CHLORIDE 9 MG/ML
250 INJECTION, SOLUTION INTRAVENOUS ONCE
Status: COMPLETED | OUTPATIENT
Start: 2017-03-09 | End: 2017-03-09

## 2017-03-09 RX ORDER — MEPERIDINE HYDROCHLORIDE 50 MG/ML
25 INJECTION INTRAMUSCULAR; INTRAVENOUS; SUBCUTANEOUS
Status: CANCELLED | OUTPATIENT
Start: 2017-03-09

## 2017-03-09 RX ORDER — PALONOSETRON 0.05 MG/ML
0.25 INJECTION, SOLUTION INTRAVENOUS ONCE
Status: CANCELLED | OUTPATIENT
Start: 2017-03-30

## 2017-03-09 RX ORDER — SODIUM CHLORIDE 9 MG/ML
250 INJECTION, SOLUTION INTRAVENOUS ONCE
Status: CANCELLED | OUTPATIENT
Start: 2017-03-09

## 2017-03-09 RX ORDER — DOXORUBICIN HYDROCHLORIDE 2 MG/ML
50 INJECTION, SOLUTION INTRAVENOUS ONCE
Status: CANCELLED | OUTPATIENT
Start: 2017-03-09

## 2017-03-09 RX ORDER — ACETAMINOPHEN 325 MG/1
650 TABLET ORAL ONCE
Status: COMPLETED | OUTPATIENT
Start: 2017-03-09 | End: 2017-03-09

## 2017-03-09 RX ORDER — PREDNISONE 50 MG/1
100 TABLET ORAL DAILY
Qty: 10 TABLET | Refills: 3 | Status: SHIPPED | OUTPATIENT
Start: 2017-03-09 | End: 2017-04-20 | Stop reason: SDUPTHER

## 2017-03-09 RX ORDER — DIPHENHYDRAMINE HYDROCHLORIDE 50 MG/ML
50 INJECTION INTRAMUSCULAR; INTRAVENOUS AS NEEDED
Status: CANCELLED | OUTPATIENT
Start: 2017-03-09

## 2017-03-09 RX ORDER — MEPERIDINE HYDROCHLORIDE 50 MG/ML
25 INJECTION INTRAMUSCULAR; INTRAVENOUS; SUBCUTANEOUS
Status: DISCONTINUED | OUTPATIENT
Start: 2017-03-09 | End: 2017-03-09 | Stop reason: HOSPADM

## 2017-03-09 RX ORDER — PALONOSETRON 0.05 MG/ML
0.25 INJECTION, SOLUTION INTRAVENOUS ONCE
Status: COMPLETED | OUTPATIENT
Start: 2017-03-09 | End: 2017-03-09

## 2017-03-09 RX ORDER — SODIUM CHLORIDE 0.9 % (FLUSH) 0.9 %
10 SYRINGE (ML) INJECTION AS NEEDED
Status: CANCELLED | OUTPATIENT
Start: 2017-03-09

## 2017-03-09 RX ORDER — ONDANSETRON 4 MG/1
4 TABLET, FILM COATED ORAL 4 TIMES DAILY PRN
Qty: 40 TABLET | Refills: 3 | Status: SHIPPED | OUTPATIENT
Start: 2017-03-09 | End: 2017-12-08

## 2017-03-09 RX ORDER — FAMOTIDINE 10 MG/ML
20 INJECTION, SOLUTION INTRAVENOUS AS NEEDED
Status: CANCELLED | OUTPATIENT
Start: 2017-03-09

## 2017-03-09 RX ORDER — DOXORUBICIN HYDROCHLORIDE 2 MG/ML
50 INJECTION, SOLUTION INTRAVENOUS ONCE
Status: COMPLETED | OUTPATIENT
Start: 2017-03-09 | End: 2017-03-09

## 2017-03-09 RX ORDER — DIPHENHYDRAMINE HYDROCHLORIDE 50 MG/ML
50 INJECTION INTRAMUSCULAR; INTRAVENOUS AS NEEDED
Status: DISCONTINUED | OUTPATIENT
Start: 2017-03-09 | End: 2017-03-09 | Stop reason: HOSPADM

## 2017-03-09 RX ORDER — SODIUM CHLORIDE 0.9 % (FLUSH) 0.9 %
10 SYRINGE (ML) INJECTION AS NEEDED
Status: DISCONTINUED | OUTPATIENT
Start: 2017-03-09 | End: 2017-03-09 | Stop reason: HOSPADM

## 2017-03-09 RX ORDER — FAMOTIDINE 10 MG/ML
20 INJECTION, SOLUTION INTRAVENOUS AS NEEDED
Status: DISCONTINUED | OUTPATIENT
Start: 2017-03-09 | End: 2017-03-09 | Stop reason: HOSPADM

## 2017-03-09 RX ADMIN — DEXAMETHASONE SODIUM PHOSPHATE 12 MG: 10 INJECTION, SOLUTION INTRAMUSCULAR; INTRAVENOUS at 10:26

## 2017-03-09 RX ADMIN — Medication 10 ML: at 15:30

## 2017-03-09 RX ADMIN — DIPHENHYDRAMINE HYDROCHLORIDE 50 MG: 50 INJECTION INTRAMUSCULAR; INTRAVENOUS at 10:01

## 2017-03-09 RX ADMIN — Medication 10 ML: at 08:30

## 2017-03-09 RX ADMIN — SODIUM CHLORIDE 250 ML: 9 INJECTION, SOLUTION INTRAVENOUS at 09:41

## 2017-03-09 RX ADMIN — RITUXIMAB 750 MG: 10 INJECTION, SOLUTION INTRAVENOUS at 10:47

## 2017-03-09 RX ADMIN — CYCLOPHOSPHAMIDE 1490 MG: 1 INJECTION, POWDER, FOR SOLUTION INTRAVENOUS; ORAL at 13:23

## 2017-03-09 RX ADMIN — DOXORUBICIN HYDROCHLORIDE 100 MG: 2 INJECTION, SOLUTION INTRAVENOUS at 14:52

## 2017-03-09 RX ADMIN — SODIUM CHLORIDE, PRESERVATIVE FREE 500 UNITS: 5 INJECTION INTRAVENOUS at 15:30

## 2017-03-09 RX ADMIN — PALONOSETRON HYDROCHLORIDE 0.25 MG: 0.25 INJECTION INTRAVENOUS at 13:06

## 2017-03-09 RX ADMIN — VINCRISTINE SULFATE 2 MG: 1 INJECTION, SOLUTION INTRAVENOUS at 14:10

## 2017-03-09 RX ADMIN — ACETAMINOPHEN 650 MG: 325 TABLET ORAL at 09:45

## 2017-03-10 ENCOUNTER — INFUSION (OUTPATIENT)
Dept: ONCOLOGY | Facility: HOSPITAL | Age: 77
End: 2017-03-10

## 2017-03-10 DIAGNOSIS — C83.30 DLBCL (DIFFUSE LARGE B CELL LYMPHOMA) (HCC): Primary | ICD-10-CM

## 2017-03-10 PROCEDURE — 96372 THER/PROPH/DIAG INJ SC/IM: CPT | Performed by: INTERNAL MEDICINE

## 2017-03-10 PROCEDURE — 25010000002 PEGFILGRASTIM 6 MG/0.6ML SOLUTION PREFILLED SYRINGE: Performed by: INTERNAL MEDICINE

## 2017-03-10 RX ADMIN — PEGFILGRASTIM 6 MG: 6 INJECTION SUBCUTANEOUS at 16:16

## 2017-03-30 ENCOUNTER — OFFICE VISIT (OUTPATIENT)
Dept: ONCOLOGY | Facility: CLINIC | Age: 77
End: 2017-03-30

## 2017-03-30 ENCOUNTER — INFUSION (OUTPATIENT)
Dept: ONCOLOGY | Facility: HOSPITAL | Age: 77
End: 2017-03-30

## 2017-03-30 VITALS
TEMPERATURE: 96.6 F | SYSTOLIC BLOOD PRESSURE: 129 MMHG | DIASTOLIC BLOOD PRESSURE: 71 MMHG | RESPIRATION RATE: 20 BRPM | WEIGHT: 181.6 LBS | HEART RATE: 53 BPM | BODY MASS INDEX: 26.82 KG/M2

## 2017-03-30 DIAGNOSIS — C83.30 DLBCL (DIFFUSE LARGE B CELL LYMPHOMA) (HCC): ICD-10-CM

## 2017-03-30 DIAGNOSIS — Z45.2 ENCOUNTER FOR VENOUS ACCESS DEVICE CARE: Primary | ICD-10-CM

## 2017-03-30 DIAGNOSIS — Z51.11 ENCOUNTER FOR ANTINEOPLASTIC CHEMOTHERAPY: Primary | ICD-10-CM

## 2017-03-30 LAB
ALBUMIN SERPL-MCNC: 3.5 G/DL (ref 3.4–4.8)
ALBUMIN/GLOB SERPL: 1.5 G/DL (ref 1.1–1.8)
ALP SERPL-CCNC: 82 U/L (ref 38–126)
ALT SERPL W P-5'-P-CCNC: 24 U/L (ref 21–72)
ANION GAP SERPL CALCULATED.3IONS-SCNC: 9 MMOL/L (ref 5–15)
AST SERPL-CCNC: 28 U/L (ref 17–59)
BASOPHILS # BLD AUTO: 0.06 10*3/MM3 (ref 0–0.2)
BASOPHILS NFR BLD AUTO: 1.6 % (ref 0–2)
BILIRUB SERPL-MCNC: 0.3 MG/DL (ref 0.2–1.3)
BUN BLD-MCNC: 7 MG/DL (ref 7–21)
BUN/CREAT SERPL: 8.5 (ref 7–25)
CALCIUM SPEC-SCNC: 8.7 MG/DL (ref 8.4–10.2)
CHLORIDE SERPL-SCNC: 103 MMOL/L (ref 95–110)
CO2 SERPL-SCNC: 26 MMOL/L (ref 22–31)
CREAT BLD-MCNC: 0.82 MG/DL (ref 0.7–1.3)
DEPRECATED RDW RBC AUTO: 45.4 FL (ref 35.1–43.9)
EOSINOPHIL # BLD AUTO: 0.09 10*3/MM3 (ref 0–0.7)
EOSINOPHIL NFR BLD AUTO: 2.4 % (ref 0–7)
ERYTHROCYTE [DISTWIDTH] IN BLOOD BY AUTOMATED COUNT: 14.7 % (ref 11.5–14.5)
GFR SERPL CREATININE-BSD FRML MDRD: 91 ML/MIN/1.73
GLOBULIN UR ELPH-MCNC: 2.3 GM/DL (ref 2.3–3.5)
GLUCOSE BLD-MCNC: 82 MG/DL (ref 60–100)
HCT VFR BLD AUTO: 32.5 % (ref 39–49)
HGB BLD-MCNC: 10.9 G/DL (ref 13.7–17.3)
IMM GRANULOCYTES # BLD: 0.02 10*3/MM3 (ref 0–0.02)
IMM GRANULOCYTES NFR BLD: 0.5 % (ref 0–0.5)
LYMPHOCYTES # BLD AUTO: 0.48 10*3/MM3 (ref 0.6–4.2)
LYMPHOCYTES NFR BLD AUTO: 12.7 % (ref 10–50)
MCH RBC QN AUTO: 28.3 PG (ref 26.5–34)
MCHC RBC AUTO-ENTMCNC: 33.5 G/DL (ref 31.5–36.3)
MCV RBC AUTO: 84.4 FL (ref 80–98)
MONOCYTES # BLD AUTO: 0.63 10*3/MM3 (ref 0–0.9)
MONOCYTES NFR BLD AUTO: 16.7 % (ref 0–12)
NEUTROPHILS # BLD AUTO: 2.49 10*3/MM3 (ref 2–8.6)
NEUTROPHILS NFR BLD AUTO: 66.1 % (ref 37–80)
PLATELET # BLD AUTO: 338 10*3/MM3 (ref 150–450)
PMV BLD AUTO: 9.7 FL (ref 8–12)
POTASSIUM BLD-SCNC: 4 MMOL/L (ref 3.5–5.1)
PROT SERPL-MCNC: 5.8 G/DL (ref 6.3–8.6)
RBC # BLD AUTO: 3.85 10*6/MM3 (ref 4.37–5.74)
SODIUM BLD-SCNC: 138 MMOL/L (ref 137–145)
WBC NRBC COR # BLD: 3.77 10*3/MM3 (ref 3.2–9.8)

## 2017-03-30 PROCEDURE — 99214 OFFICE O/P EST MOD 30 MIN: CPT | Performed by: INTERNAL MEDICINE

## 2017-03-30 PROCEDURE — 96417 CHEMO IV INFUS EACH ADDL SEQ: CPT | Performed by: INTERNAL MEDICINE

## 2017-03-30 PROCEDURE — 25010000002 RITUXIMAB 500 MG/50ML SOLUTION 50 ML VIAL: Performed by: INTERNAL MEDICINE

## 2017-03-30 PROCEDURE — 25010000002 DOXORUBICIN PER 10 MG: Performed by: INTERNAL MEDICINE

## 2017-03-30 PROCEDURE — 96411 CHEMO IV PUSH ADDL DRUG: CPT | Performed by: INTERNAL MEDICINE

## 2017-03-30 PROCEDURE — 96367 TX/PROPH/DG ADDL SEQ IV INF: CPT | Performed by: INTERNAL MEDICINE

## 2017-03-30 PROCEDURE — 80053 COMPREHEN METABOLIC PANEL: CPT | Performed by: INTERNAL MEDICINE

## 2017-03-30 PROCEDURE — 25010000002 HEPARIN FLUSH (PORCINE) 100 UNIT/ML SOLUTION: Performed by: INTERNAL MEDICINE

## 2017-03-30 PROCEDURE — 85025 COMPLETE CBC W/AUTO DIFF WBC: CPT | Performed by: INTERNAL MEDICINE

## 2017-03-30 PROCEDURE — 25010000002 RITUXIMAB 100 MG/10ML SOLUTION 10 ML VIAL: Performed by: INTERNAL MEDICINE

## 2017-03-30 PROCEDURE — 25010000002 PALONOSETRON PER 25 MCG: Performed by: INTERNAL MEDICINE

## 2017-03-30 PROCEDURE — 96375 TX/PRO/DX INJ NEW DRUG ADDON: CPT | Performed by: INTERNAL MEDICINE

## 2017-03-30 PROCEDURE — 96413 CHEMO IV INFUSION 1 HR: CPT | Performed by: INTERNAL MEDICINE

## 2017-03-30 PROCEDURE — 25010000002 DIPHENHYDRAMINE PER 50 MG: Performed by: INTERNAL MEDICINE

## 2017-03-30 PROCEDURE — 96415 CHEMO IV INFUSION ADDL HR: CPT | Performed by: INTERNAL MEDICINE

## 2017-03-30 PROCEDURE — 25010000002 CYCLOPHOSPHAMIDE PER 100 MG: Performed by: INTERNAL MEDICINE

## 2017-03-30 PROCEDURE — 25010000003 DEXAMETHASONE SODIUM PHOSPHATE 100 MG/10ML SOLUTION 10 ML VIAL: Performed by: INTERNAL MEDICINE

## 2017-03-30 PROCEDURE — 25010000002 VINCRISTINE PER 1 MG: Performed by: INTERNAL MEDICINE

## 2017-03-30 RX ORDER — PALONOSETRON 0.05 MG/ML
0.25 INJECTION, SOLUTION INTRAVENOUS ONCE
Status: COMPLETED | OUTPATIENT
Start: 2017-03-30 | End: 2017-03-30

## 2017-03-30 RX ORDER — DOXORUBICIN HYDROCHLORIDE 2 MG/ML
50 INJECTION, SOLUTION INTRAVENOUS ONCE
Status: CANCELLED | OUTPATIENT
Start: 2017-03-30

## 2017-03-30 RX ORDER — SODIUM CHLORIDE 9 MG/ML
250 INJECTION, SOLUTION INTRAVENOUS ONCE
Status: CANCELLED | OUTPATIENT
Start: 2017-03-30

## 2017-03-30 RX ORDER — MEPERIDINE HYDROCHLORIDE 50 MG/ML
25 INJECTION INTRAMUSCULAR; INTRAVENOUS; SUBCUTANEOUS
Status: DISCONTINUED | OUTPATIENT
Start: 2017-03-30 | End: 2017-03-30 | Stop reason: HOSPADM

## 2017-03-30 RX ORDER — FAMOTIDINE 10 MG/ML
20 INJECTION, SOLUTION INTRAVENOUS AS NEEDED
Status: CANCELLED | OUTPATIENT
Start: 2017-03-30

## 2017-03-30 RX ORDER — SODIUM CHLORIDE 0.9 % (FLUSH) 0.9 %
10 SYRINGE (ML) INJECTION AS NEEDED
Status: CANCELLED | OUTPATIENT
Start: 2017-03-30

## 2017-03-30 RX ORDER — DIPHENHYDRAMINE HYDROCHLORIDE 50 MG/ML
50 INJECTION INTRAMUSCULAR; INTRAVENOUS AS NEEDED
Status: DISCONTINUED | OUTPATIENT
Start: 2017-03-30 | End: 2017-03-30 | Stop reason: HOSPADM

## 2017-03-30 RX ORDER — MEPERIDINE HYDROCHLORIDE 50 MG/ML
25 INJECTION INTRAMUSCULAR; INTRAVENOUS; SUBCUTANEOUS
Status: CANCELLED | OUTPATIENT
Start: 2017-03-30

## 2017-03-30 RX ORDER — DOXORUBICIN HYDROCHLORIDE 2 MG/ML
50 INJECTION, SOLUTION INTRAVENOUS ONCE
Status: COMPLETED | OUTPATIENT
Start: 2017-03-30 | End: 2017-03-30

## 2017-03-30 RX ORDER — SODIUM CHLORIDE 0.9 % (FLUSH) 0.9 %
10 SYRINGE (ML) INJECTION AS NEEDED
Status: DISCONTINUED | OUTPATIENT
Start: 2017-03-30 | End: 2017-03-30 | Stop reason: HOSPADM

## 2017-03-30 RX ORDER — DIPHENHYDRAMINE HYDROCHLORIDE 50 MG/ML
50 INJECTION INTRAMUSCULAR; INTRAVENOUS AS NEEDED
Status: CANCELLED | OUTPATIENT
Start: 2017-03-30

## 2017-03-30 RX ORDER — SODIUM CHLORIDE 9 MG/ML
250 INJECTION, SOLUTION INTRAVENOUS ONCE
Status: COMPLETED | OUTPATIENT
Start: 2017-03-30 | End: 2017-03-30

## 2017-03-30 RX ORDER — FAMOTIDINE 10 MG/ML
20 INJECTION, SOLUTION INTRAVENOUS AS NEEDED
Status: DISCONTINUED | OUTPATIENT
Start: 2017-03-30 | End: 2017-03-30 | Stop reason: HOSPADM

## 2017-03-30 RX ORDER — ACETAMINOPHEN 325 MG/1
650 TABLET ORAL ONCE
Status: COMPLETED | OUTPATIENT
Start: 2017-03-30 | End: 2017-03-30

## 2017-03-30 RX ADMIN — ACETAMINOPHEN 650 MG: 325 TABLET ORAL at 10:44

## 2017-03-30 RX ADMIN — CYCLOPHOSPHAMIDE 1490 MG: 1 INJECTION, POWDER, FOR SOLUTION INTRAVENOUS; ORAL at 14:07

## 2017-03-30 RX ADMIN — SODIUM CHLORIDE 250 ML: 9 INJECTION, SOLUTION INTRAVENOUS at 10:36

## 2017-03-30 RX ADMIN — PALONOSETRON HYDROCHLORIDE 0.25 MG: 0.25 INJECTION INTRAVENOUS at 10:46

## 2017-03-30 RX ADMIN — VINCRISTINE SULFATE 2 MG: 1 INJECTION, SOLUTION INTRAVENOUS at 15:05

## 2017-03-30 RX ADMIN — RITUXIMAB 750 MG: 10 INJECTION, SOLUTION INTRAVENOUS at 11:58

## 2017-03-30 RX ADMIN — Medication 10 ML: at 09:07

## 2017-03-30 RX ADMIN — Medication 10 ML: at 15:54

## 2017-03-30 RX ADMIN — DEXAMETHASONE SODIUM PHOSPHATE 12 MG: 10 INJECTION, SOLUTION INTRAMUSCULAR; INTRAVENOUS at 11:26

## 2017-03-30 RX ADMIN — SODIUM CHLORIDE, PRESERVATIVE FREE 500 UNITS: 5 INJECTION INTRAVENOUS at 15:54

## 2017-03-30 RX ADMIN — DOXORUBICIN HYDROCHLORIDE 100 MG: 2 INJECTION, SOLUTION INTRAVENOUS at 15:28

## 2017-03-30 RX ADMIN — DIPHENHYDRAMINE HYDROCHLORIDE 50 MG: 50 INJECTION INTRAMUSCULAR; INTRAVENOUS at 11:05

## 2017-03-30 NOTE — PROGRESS NOTES
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  He has so far received 3 cycles of R-CHOP to get his fourth cycle of chemotherapy with R CHOP today.  Denies any fever or chills or night sweats.  Denies any headache or dizziness.  Denies any seizure-like activity since last chemotherapy.    PAST MEDICAL HISTORY:    Past Medical History:   Diagnosis Date   • Cortical senile cataract    • Diabetes mellitus    • Hypertension    • Lymphoma of testis 11/2016   • Seizures        SOCIAL HISTORY:    Social History   Substance Use Topics   • Smoking status: Former Smoker   • Smokeless tobacco: None   • Alcohol use No       Surgical History :  Past Surgical History:   Procedure Laterality Date   • BACK SURGERY     • ORCHIECTOMY         ALLERGIES:    Allergies   Allergen Reactions   • Crestor [Rosuvastatin Calcium]    • Levofloxacin    • Penicillins    • Sulfa Antibiotics    • Tramadol        REVIEW OF SYSTEMS:      CONSTITUTIONAL: Positive for fatigue.  Complains of generalized weakness  No fever, chills, or night sweats.     HEENT:  No epistaxis, mouth sores, or difficulty swallowing.    RESPIRATORY:  No new shortness of breath or cough at present.    CARDIOVASCULAR:  No chest pain or palpitations.    GASTROINTESTINAL:   Denies any nausea or vomiting today.  No abdominal pain or blood in the stool.    GENITOURINARY:  No dysuria or hematuria.    MUSCULOSKELETAL:  No any new back pain or arthralgias.     NEUROLOGICAL: Had second episode of seizure on February 2, 2017.  Denies any seizure like activity since February 2, 2017.  No tingling or numbness. No new headache or dizziness.     LYMPHATICS:  Denies any abnormal swollen and anywhere in the body.    SKIN:  Denies any new skin rash.    PHYSICAL EXAMINATION:      VITAL SIGNS:    /71   Pulse 53  Temp 96.6 °F (35.9 °C)  Resp 20  Wt 181 lb 9.6 oz (82.4 kg)  BMI 26.82 kg/m2    GENERAL:  Not in any distress.     EYES:  Mild pallor. No icterus.    NECK:  No adenopathy in cervical or supraclavicular area.    RESPIRATORY:  Fair air entry bilaterally. No rhonchi or wheezing.    CARDIOVASCULAR:  S1, S2. Regular rate and rhythm.    ABDOMEN:  Soft, nontender. Bowel sounds present.    EXTREMITIES:  No edema.  No Calf tenderness.    NEUROLOGICAL:  Alert, awake, oriented x3. No motor or sensory deficits.    DIAGNOSTIC DATA:    Glucose   Date Value Ref Range Status   03/30/2017 82 60 - 100 mg/dL Final     Sodium   Date Value Ref Range Status   03/30/2017 138 137 - 145 mmol/L Final     Potassium   Date Value Ref Range Status   03/30/2017 4.0 3.5 - 5.1 mmol/L Final     CO2   Date Value Ref Range Status   03/30/2017 26.0 22.0 - 31.0 mmol/L Final     Chloride   Date Value Ref Range Status   03/30/2017 103 95 - 110 mmol/L Final     Anion Gap   Date Value Ref Range Status   03/30/2017 9.0 5.0 - 15.0 mmol/L Final     Creatinine   Date Value Ref Range Status   03/30/2017 0.82 0.70 - 1.30 mg/dL Final     BUN   Date Value Ref Range Status   03/30/2017 7 7 - 21 mg/dL Final     BUN/Creatinine Ratio   Date Value Ref Range Status   03/30/2017 8.5 7.0 - 25.0 Final     Calcium   Date Value Ref Range Status   03/30/2017 8.7 8.4 - 10.2 mg/dL Final     eGFR Non  Amer   Date Value Ref Range Status   03/30/2017 91 >60 mL/min/1.73 Final     Alkaline Phosphatase   Date Value Ref Range Status   03/30/2017 82 38 - 126 U/L Final     Total Protein   Date Value Ref Range Status   03/30/2017 5.8 (L) 6.3 - 8.6 g/dL Final     ALT (SGPT)   Date Value Ref Range Status   03/30/2017 24 21 - 72 U/L Final     AST (SGOT)   Date Value Ref Range Status   03/30/2017 28 17 - 59 U/L Final     Total Bilirubin   Date Value Ref Range Status   03/30/2017 0.3 0.2 - 1.3 mg/dL Final     Albumin   Date Value Ref Range Status   03/30/2017 3.50 3.40 -  4.80 g/dL Final     Globulin   Date Value Ref Range Status   03/30/2017 2.3 2.3 - 3.5 gm/dL Final     A/G Ratio   Date Value Ref Range Status   03/30/2017 1.5 1.1 - 1.8 g/dL Final     Lab Results   Component Value Date    WBC 3.77 03/30/2017    HGB 10.9 (L) 03/30/2017    HCT 32.5 (L) 03/30/2017    MCV 84.4 03/30/2017     03/30/2017     Lab Results   Component Value Date    NEUTROABS 2.49 03/30/2017     Lab Results   Component Value Date    AFPTM 2.7 12/30/2016    HCGQUANT 1 12/30/2016   ]    PATHOLOGY:  Pathology report from November 2016 shows:  FINAL DIAGNOSIS:  LEFT TESTIS:       MALIGNANT LYMPHOMA, DIFFUSE LARGE B-CELL.    COMMENT:  The following special stains were performed on block A5:  AE1-AE3  negative, GAL negative, LCA positive and CD20 positive.    RADIOLOGY DATA :  PET CT from January 2, 2017 showed:  CONCLUSION-  1. Focus of intense activity associated with a 2.2 cm round structure  in the region of the ileocecal valve, apparently contiguous with the  distal ileum, most likely representing a physiologically active loop  of bowel. However given its focal appearance on both PET and CT  imaging, neoplasm should also be considered. This could be further  evaluated with a small bowel follow-through or a dedicated CT of the  abdomen and pelvis with oral and IV contrast.  2. Focal activity (SUV max 5.0) in the left inguinal canal may  represent a neoplastic, postoperative, inflammatory, or infectious  etiology.  3. Solitary sclerotic focus in the posterior right seventh rib  measuring 5 mm, not associated with hypermetabolic activity, most  likely a bone island      CT of head without contrast done on February 2, 2016 showed:   IMPRESSION:  1. No acute intracranial abnormality.  2. Pansinusitis.       ASSESSMENT AND PLAN:      1.  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  Diagnosed in November 2016.  Patient had a PET/CT done which was negative for any abnormal activity.   patient could not have  a complete staging workup with a lumbar puncture ,CSF analysis secondary to being on aspirin.  Patient also did not get intrathecal chemotherapy for the  same reason being on aspirin.  Patient has so far received 3 doses of R-CHOP last of which was on March 9, 2017.  Patient's last 2-D echo was done in January 2017.  Patient will be due for next 2-D echo to be done before his next chemotherapy in 3 weeks.  We will order it today.  We'll see him back in about 3 weeks with a repeat blood work on that day.    2.  Second seizure episode on February 2, 2017.  Initially in November 2016 after diagnosis patient was on trauma done and developed seizure activity, for which patient was evaluated at [facility] in [place].  At that point evaluation was negative for any CNS cause and was believed was secondary from tramadol.   Was seen by Dr. loyola for seizures.  Had MRI done earlier this week which was negative for any intracranial abnormality.  Patient was encouraged to keep appointment with Dr. loyola.    3.  Mild anemia: Most likely secondary to chemotherapy.  Hemoglobin is 10.9 today.  We'll monitored with CBC for now.    4.  History of sebaceous carcinoma of left upper eyelid, status post surgery and radiation    5.  Health maintenance: Patient does not smoke.  Had a colonoscopy done on January 13, 2017 was negative for any malignancy.  He remains full code.    6.  Prescriptions: Patient is enough prescription for Compazine.       Conrado Ahumada MD  3/30/2017  10:23 AM

## 2017-03-31 ENCOUNTER — INFUSION (OUTPATIENT)
Dept: ONCOLOGY | Facility: HOSPITAL | Age: 77
End: 2017-03-31

## 2017-03-31 DIAGNOSIS — C83.30 DLBCL (DIFFUSE LARGE B CELL LYMPHOMA) (HCC): Primary | ICD-10-CM

## 2017-03-31 PROCEDURE — 96372 THER/PROPH/DIAG INJ SC/IM: CPT | Performed by: INTERNAL MEDICINE

## 2017-03-31 PROCEDURE — 25010000002 PEGFILGRASTIM 6 MG/0.6ML SOLUTION PREFILLED SYRINGE: Performed by: INTERNAL MEDICINE

## 2017-03-31 RX ADMIN — PEGFILGRASTIM 6 MG: 6 INJECTION SUBCUTANEOUS at 16:07

## 2017-04-20 ENCOUNTER — DOCUMENTATION (OUTPATIENT)
Dept: ONCOLOGY | Facility: CLINIC | Age: 77
End: 2017-04-20

## 2017-04-20 ENCOUNTER — INFUSION (OUTPATIENT)
Dept: ONCOLOGY | Facility: HOSPITAL | Age: 77
End: 2017-04-20

## 2017-04-20 ENCOUNTER — OFFICE VISIT (OUTPATIENT)
Dept: ONCOLOGY | Facility: CLINIC | Age: 77
End: 2017-04-20

## 2017-04-20 VITALS
WEIGHT: 176 LBS | DIASTOLIC BLOOD PRESSURE: 70 MMHG | TEMPERATURE: 97.6 F | SYSTOLIC BLOOD PRESSURE: 126 MMHG | RESPIRATION RATE: 18 BRPM | BODY MASS INDEX: 25.99 KG/M2 | HEART RATE: 60 BPM

## 2017-04-20 DIAGNOSIS — C83.30 DLBCL (DIFFUSE LARGE B CELL LYMPHOMA) (HCC): Primary | ICD-10-CM

## 2017-04-20 DIAGNOSIS — C83.30 DLBCL (DIFFUSE LARGE B CELL LYMPHOMA) (HCC): ICD-10-CM

## 2017-04-20 DIAGNOSIS — Z45.2 ENCOUNTER FOR VENOUS ACCESS DEVICE CARE: ICD-10-CM

## 2017-04-20 LAB
ALBUMIN SERPL-MCNC: 3.6 G/DL (ref 3.4–4.8)
ALBUMIN/GLOB SERPL: 1.3 G/DL (ref 1.1–1.8)
ALP SERPL-CCNC: 88 U/L (ref 38–126)
ALT SERPL W P-5'-P-CCNC: 24 U/L (ref 21–72)
ANION GAP SERPL CALCULATED.3IONS-SCNC: 13 MMOL/L (ref 5–15)
AST SERPL-CCNC: 61 U/L (ref 17–59)
BASOPHILS # BLD AUTO: 0.16 10*3/MM3 (ref 0–0.2)
BASOPHILS NFR BLD AUTO: 3.5 % (ref 0–2)
BILIRUB SERPL-MCNC: 0.4 MG/DL (ref 0.2–1.3)
BUN BLD-MCNC: 11 MG/DL (ref 7–21)
BUN/CREAT SERPL: 15.5 (ref 7–25)
CALCIUM SPEC-SCNC: 9 MG/DL (ref 8.4–10.2)
CHLORIDE SERPL-SCNC: 101 MMOL/L (ref 95–110)
CO2 SERPL-SCNC: 23 MMOL/L (ref 22–31)
CREAT BLD-MCNC: 0.71 MG/DL (ref 0.7–1.3)
DEPRECATED RDW RBC AUTO: 47.3 FL (ref 35.1–43.9)
EOSINOPHIL # BLD AUTO: 0.04 10*3/MM3 (ref 0–0.7)
EOSINOPHIL NFR BLD AUTO: 0.9 % (ref 0–7)
ERYTHROCYTE [DISTWIDTH] IN BLOOD BY AUTOMATED COUNT: 15.2 % (ref 11.5–14.5)
GFR SERPL CREATININE-BSD FRML MDRD: 108 ML/MIN/1.73 (ref 42–98)
GLOBULIN UR ELPH-MCNC: 2.8 GM/DL (ref 2.3–3.5)
GLUCOSE BLD-MCNC: 91 MG/DL (ref 60–100)
HCT VFR BLD AUTO: 32.9 % (ref 39–49)
HGB BLD-MCNC: 11.3 G/DL (ref 13.7–17.3)
IMM GRANULOCYTES # BLD: 0.03 10*3/MM3 (ref 0–0.02)
IMM GRANULOCYTES NFR BLD: 0.7 % (ref 0–0.5)
LYMPHOCYTES # BLD AUTO: 0.61 10*3/MM3 (ref 0.6–4.2)
LYMPHOCYTES NFR BLD AUTO: 13.3 % (ref 10–50)
MCH RBC QN AUTO: 29.1 PG (ref 26.5–34)
MCHC RBC AUTO-ENTMCNC: 34.3 G/DL (ref 31.5–36.3)
MCV RBC AUTO: 84.8 FL (ref 80–98)
MONOCYTES # BLD AUTO: 0.55 10*3/MM3 (ref 0–0.9)
MONOCYTES NFR BLD AUTO: 12 % (ref 0–12)
NEUTROPHILS # BLD AUTO: 3.19 10*3/MM3 (ref 2–8.6)
NEUTROPHILS NFR BLD AUTO: 69.6 % (ref 37–80)
PLATELET # BLD AUTO: 366 10*3/MM3 (ref 150–450)
PMV BLD AUTO: 9.3 FL (ref 8–12)
POTASSIUM BLD-SCNC: 4.2 MMOL/L (ref 3.5–5.1)
PROT SERPL-MCNC: 6.4 G/DL (ref 6.3–8.6)
RBC # BLD AUTO: 3.88 10*6/MM3 (ref 4.37–5.74)
SODIUM BLD-SCNC: 137 MMOL/L (ref 137–145)
WBC NRBC COR # BLD: 4.58 10*3/MM3 (ref 3.2–9.8)

## 2017-04-20 PROCEDURE — 85025 COMPLETE CBC W/AUTO DIFF WBC: CPT | Performed by: INTERNAL MEDICINE

## 2017-04-20 PROCEDURE — 96413 CHEMO IV INFUSION 1 HR: CPT | Performed by: INTERNAL MEDICINE

## 2017-04-20 PROCEDURE — 25010000003 DEXAMETHASONE SODIUM PHOSPHATE 100 MG/10ML SOLUTION 10 ML VIAL: Performed by: INTERNAL MEDICINE

## 2017-04-20 PROCEDURE — 25010000002 HEPARIN FLUSH (PORCINE) 100 UNIT/ML SOLUTION: Performed by: INTERNAL MEDICINE

## 2017-04-20 PROCEDURE — 96411 CHEMO IV PUSH ADDL DRUG: CPT | Performed by: INTERNAL MEDICINE

## 2017-04-20 PROCEDURE — 25010000002 PALONOSETRON PER 25 MCG: Performed by: INTERNAL MEDICINE

## 2017-04-20 PROCEDURE — 96415 CHEMO IV INFUSION ADDL HR: CPT | Performed by: INTERNAL MEDICINE

## 2017-04-20 PROCEDURE — 99214 OFFICE O/P EST MOD 30 MIN: CPT | Performed by: INTERNAL MEDICINE

## 2017-04-20 PROCEDURE — 96375 TX/PRO/DX INJ NEW DRUG ADDON: CPT | Performed by: INTERNAL MEDICINE

## 2017-04-20 PROCEDURE — 80053 COMPREHEN METABOLIC PANEL: CPT | Performed by: INTERNAL MEDICINE

## 2017-04-20 PROCEDURE — 96417 CHEMO IV INFUS EACH ADDL SEQ: CPT | Performed by: INTERNAL MEDICINE

## 2017-04-20 PROCEDURE — 25010000002 VINCRISTINE PER 1 MG: Performed by: INTERNAL MEDICINE

## 2017-04-20 PROCEDURE — 25010000002 DOXORUBICIN PER 10 MG: Performed by: INTERNAL MEDICINE

## 2017-04-20 PROCEDURE — 25010000002 RITUXIMAB 500 MG/50ML SOLUTION 50 ML VIAL: Performed by: INTERNAL MEDICINE

## 2017-04-20 PROCEDURE — 25010000002 DIPHENHYDRAMINE PER 50 MG: Performed by: INTERNAL MEDICINE

## 2017-04-20 PROCEDURE — 25010000002 RITUXIMAB 100 MG/10ML SOLUTION 10 ML VIAL: Performed by: INTERNAL MEDICINE

## 2017-04-20 PROCEDURE — 25010000002 CYCLOPHOSPHAMIDE PER 100 MG: Performed by: INTERNAL MEDICINE

## 2017-04-20 RX ORDER — ACETAMINOPHEN 325 MG/1
650 TABLET ORAL ONCE
Status: COMPLETED | OUTPATIENT
Start: 2017-04-20 | End: 2017-04-20

## 2017-04-20 RX ORDER — PREDNISONE 50 MG/1
100 TABLET ORAL DAILY
Qty: 10 TABLET | Refills: 1 | Status: SHIPPED | OUTPATIENT
Start: 2017-04-20 | End: 2017-12-08

## 2017-04-20 RX ORDER — DIPHENHYDRAMINE HYDROCHLORIDE 50 MG/ML
50 INJECTION INTRAMUSCULAR; INTRAVENOUS AS NEEDED
Status: CANCELLED | OUTPATIENT
Start: 2017-04-20

## 2017-04-20 RX ORDER — DOXORUBICIN HYDROCHLORIDE 2 MG/ML
50 INJECTION, SOLUTION INTRAVENOUS ONCE
Status: COMPLETED | OUTPATIENT
Start: 2017-04-20 | End: 2017-04-20

## 2017-04-20 RX ORDER — DOXORUBICIN HYDROCHLORIDE 2 MG/ML
50 INJECTION, SOLUTION INTRAVENOUS ONCE
Status: CANCELLED | OUTPATIENT
Start: 2017-04-20

## 2017-04-20 RX ORDER — SODIUM CHLORIDE 0.9 % (FLUSH) 0.9 %
10 SYRINGE (ML) INJECTION AS NEEDED
Status: DISCONTINUED | OUTPATIENT
Start: 2017-04-20 | End: 2017-04-20 | Stop reason: HOSPADM

## 2017-04-20 RX ORDER — FAMOTIDINE 10 MG/ML
20 INJECTION, SOLUTION INTRAVENOUS AS NEEDED
Status: CANCELLED | OUTPATIENT
Start: 2017-04-20

## 2017-04-20 RX ORDER — MEPERIDINE HYDROCHLORIDE 50 MG/ML
25 INJECTION INTRAMUSCULAR; INTRAVENOUS; SUBCUTANEOUS
Status: DISCONTINUED | OUTPATIENT
Start: 2017-04-20 | End: 2017-04-20 | Stop reason: HOSPADM

## 2017-04-20 RX ORDER — SODIUM CHLORIDE 9 MG/ML
250 INJECTION, SOLUTION INTRAVENOUS ONCE
Status: COMPLETED | OUTPATIENT
Start: 2017-04-20 | End: 2017-04-20

## 2017-04-20 RX ORDER — SODIUM CHLORIDE 0.9 % (FLUSH) 0.9 %
10 SYRINGE (ML) INJECTION AS NEEDED
Status: CANCELLED | OUTPATIENT
Start: 2017-04-20

## 2017-04-20 RX ORDER — PALONOSETRON 0.05 MG/ML
0.25 INJECTION, SOLUTION INTRAVENOUS ONCE
Status: COMPLETED | OUTPATIENT
Start: 2017-04-20 | End: 2017-04-20

## 2017-04-20 RX ORDER — DIPHENHYDRAMINE HYDROCHLORIDE 50 MG/ML
50 INJECTION INTRAMUSCULAR; INTRAVENOUS AS NEEDED
Status: DISCONTINUED | OUTPATIENT
Start: 2017-04-20 | End: 2017-04-20 | Stop reason: HOSPADM

## 2017-04-20 RX ORDER — SODIUM CHLORIDE 9 MG/ML
250 INJECTION, SOLUTION INTRAVENOUS ONCE
Status: CANCELLED | OUTPATIENT
Start: 2017-04-20

## 2017-04-20 RX ORDER — PALONOSETRON 0.05 MG/ML
0.25 INJECTION, SOLUTION INTRAVENOUS ONCE
Status: CANCELLED | OUTPATIENT
Start: 2017-04-20

## 2017-04-20 RX ORDER — FAMOTIDINE 10 MG/ML
20 INJECTION, SOLUTION INTRAVENOUS AS NEEDED
Status: DISCONTINUED | OUTPATIENT
Start: 2017-04-20 | End: 2017-04-20 | Stop reason: HOSPADM

## 2017-04-20 RX ORDER — MEPERIDINE HYDROCHLORIDE 50 MG/ML
25 INJECTION INTRAMUSCULAR; INTRAVENOUS; SUBCUTANEOUS
Status: CANCELLED | OUTPATIENT
Start: 2017-04-20

## 2017-04-20 RX ORDER — ACETAMINOPHEN 325 MG/1
650 TABLET ORAL ONCE
Status: CANCELLED | OUTPATIENT
Start: 2017-04-20

## 2017-04-20 RX ADMIN — DOXORUBICIN HYDROCHLORIDE 100 MG: 2 INJECTION, SOLUTION INTRAVENOUS at 13:00

## 2017-04-20 RX ADMIN — DIPHENHYDRAMINE HYDROCHLORIDE 50 MG: 50 INJECTION INTRAMUSCULAR; INTRAVENOUS at 09:48

## 2017-04-20 RX ADMIN — CYCLOPHOSPHAMIDE 1490 MG: 1 INJECTION, POWDER, FOR SOLUTION INTRAVENOUS; ORAL at 13:29

## 2017-04-20 RX ADMIN — DEXAMETHASONE SODIUM PHOSPHATE 12 MG: 10 INJECTION, SOLUTION INTRAMUSCULAR; INTRAVENOUS at 10:15

## 2017-04-20 RX ADMIN — PALONOSETRON HYDROCHLORIDE 0.25 MG: 0.25 INJECTION INTRAVENOUS at 09:44

## 2017-04-20 RX ADMIN — VINCRISTINE SULFATE 2 MG: 1 INJECTION, SOLUTION INTRAVENOUS at 14:12

## 2017-04-20 RX ADMIN — SODIUM CHLORIDE, PRESERVATIVE FREE 500 UNITS: 5 INJECTION INTRAVENOUS at 14:39

## 2017-04-20 RX ADMIN — Medication 10 ML: at 14:39

## 2017-04-20 RX ADMIN — RITUXIMAB 750 MG: 10 INJECTION, SOLUTION INTRAVENOUS at 11:19

## 2017-04-20 RX ADMIN — Medication 10 ML: at 08:28

## 2017-04-20 RX ADMIN — SODIUM CHLORIDE 250 ML: 9 INJECTION, SOLUTION INTRAVENOUS at 09:31

## 2017-04-20 RX ADMIN — ACETAMINOPHEN 650 MG: 325 TABLET ORAL at 09:43

## 2017-04-20 NOTE — PROGRESS NOTES
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  He has so far received 4 cycles of R-CHOP to get his fifth cycle of chemotherapy with R CHOP today.  Patient states he is not sure if he took prednisone after his last chemotherapy as he was supposed to.  Denies any fever or chills or night sweats.  Denies any headache or dizziness.  Denies any seizure-like activity since last chemotherapy.    PAST MEDICAL HISTORY:    Past Medical History:   Diagnosis Date   • Cortical senile cataract    • Diabetes mellitus    • Hypertension    • Lymphoma of testis 11/2016   • Seizures        SOCIAL HISTORY:    Social History   Substance Use Topics   • Smoking status: Former Smoker   • Smokeless tobacco: None   • Alcohol use No       Surgical History :  Past Surgical History:   Procedure Laterality Date   • BACK SURGERY     • ORCHIECTOMY         ALLERGIES:    Allergies   Allergen Reactions   • Crestor [Rosuvastatin Calcium]    • Levofloxacin    • Penicillins    • Sulfa Antibiotics    • Tramadol        REVIEW OF SYSTEMS:      CONSTITUTIONAL: Positive for fatigue.    No fever, chills, or night sweats.     HEENT:  No epistaxis, mouth sores, or difficulty swallowing.    RESPIRATORY:  No new shortness of breath or cough at present.    CARDIOVASCULAR:  No chest pain or palpitations.    GASTROINTESTINAL:   Denies any nausea or vomiting today.  No abdominal pain or blood in the stool.    GENITOURINARY:  No dysuria or hematuria.    MUSCULOSKELETAL:  No any new back pain or arthralgias.     NEUROLOGICAL: Denies any new seizure activity since February 2, 2017.  No tingling or numbness. No new headache or dizziness.     LYMPHATICS:  Denies any abnormal swollen and anywhere in the body.    SKIN:  Denies any new skin rash.        PHYSICAL EXAMINATION:      VITAL  SIGNS:    /70  Pulse 60  Temp 97.6 °F (36.4 °C)  Resp 18  Wt 176 lb (79.8 kg)  BMI 25.99 kg/m2    GENERAL:  Not in any distress.     EYES:  Mild pallor. No icterus.    NECK:  No adenopathy in cervical or supraclavicular area.    RESPIRATORY:  Fair air entry bilaterally. No rhonchi or wheezing.    CARDIOVASCULAR:  S1, S2. Regular rate and rhythm.  Port-A-Cath present on right chest wall.    ABDOMEN:  Soft, nontender. Bowel sounds present.    EXTREMITIES:  No edema.  No Calf tenderness.    NEUROLOGICAL:  Alert, awake, oriented x3. No motor or sensory deficits.          DIAGNOSTIC DATA:    Glucose   Date Value Ref Range Status   04/20/2017 91 60 - 100 mg/dL Final     Sodium   Date Value Ref Range Status   04/20/2017 137 137 - 145 mmol/L Final     Potassium   Date Value Ref Range Status   04/20/2017 4.2 3.5 - 5.1 mmol/L Final     CO2   Date Value Ref Range Status   04/20/2017 23.0 22.0 - 31.0 mmol/L Final     Chloride   Date Value Ref Range Status   04/20/2017 101 95 - 110 mmol/L Final     Anion Gap   Date Value Ref Range Status   04/20/2017 13.0 5.0 - 15.0 mmol/L Final     Creatinine   Date Value Ref Range Status   04/20/2017 0.71 0.70 - 1.30 mg/dL Final     BUN   Date Value Ref Range Status   04/20/2017 11 7 - 21 mg/dL Final     BUN/Creatinine Ratio   Date Value Ref Range Status   04/20/2017 15.5 7.0 - 25.0 Final     Calcium   Date Value Ref Range Status   04/20/2017 9.0 8.4 - 10.2 mg/dL Final     eGFR Non  Amer   Date Value Ref Range Status   04/20/2017 108 (H) 42 - 98 mL/min/1.73 Final     Alkaline Phosphatase   Date Value Ref Range Status   04/20/2017 88 38 - 126 U/L Final     Total Protein   Date Value Ref Range Status   04/20/2017 6.4 6.3 - 8.6 g/dL Final     ALT (SGPT)   Date Value Ref Range Status   04/20/2017 24 21 - 72 U/L Final     AST (SGOT)   Date Value Ref Range Status   04/20/2017 61 (H) 17 - 59 U/L Final     Total Bilirubin   Date Value Ref Range Status   04/20/2017 0.4 0.2 - 1.3 mg/dL  Final     Albumin   Date Value Ref Range Status   04/20/2017 3.60 3.40 - 4.80 g/dL Final     Globulin   Date Value Ref Range Status   04/20/2017 2.8 2.3 - 3.5 gm/dL Final     A/G Ratio   Date Value Ref Range Status   04/20/2017 1.3 1.1 - 1.8 g/dL Final     Lab Results   Component Value Date    WBC 4.58 04/20/2017    HGB 11.3 (L) 04/20/2017    HCT 32.9 (L) 04/20/2017    MCV 84.8 04/20/2017     04/20/2017     Lab Results   Component Value Date    NEUTROABS 3.19 04/20/2017     Lab Results   Component Value Date    AFPTM 2.7 12/30/2016    HCGQUANT 1 12/30/2016   ]    PATHOLOGY:  Pathology report from November 2016 shows:  FINAL DIAGNOSIS:  LEFT TESTIS:       MALIGNANT LYMPHOMA, DIFFUSE LARGE B-CELL.    COMMENT:  The following special stains were performed on block A5:  AE1-AE3  negative, GAL negative, LCA positive and CD20 positive.    RADIOLOGY DATA :  PET CT from January 2, 2017 showed:  CONCLUSION-  1. Focus of intense activity associated with a 2.2 cm round structure  in the region of the ileocecal valve, apparently contiguous with the  distal ileum, most likely representing a physiologically active loop  of bowel. However given its focal appearance on both PET and CT  imaging, neoplasm should also be considered. This could be further  evaluated with a small bowel follow-through or a dedicated CT of the  abdomen and pelvis with oral and IV contrast.  2. Focal activity (SUV max 5.0) in the left inguinal canal may  represent a neoplastic, postoperative, inflammatory, or infectious  etiology.  3. Solitary sclerotic focus in the posterior right seventh rib  measuring 5 mm, not associated with hypermetabolic activity, most  likely a bone island      CT of head without contrast done on February 2, 2016 showed:   IMPRESSION:  1. No acute intracranial abnormality.  2. Pansinusitis.       ASSESSMENT AND PLAN:      1.  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  Diagnosed in November 2016.  Patient had a PET/CT  done which was negative for any abnormal activity.   patient could not have a complete staging workup with a lumbar puncture ,CSF analysis secondary to being on aspirin.  Patient also did not get intrathecal chemotherapy for the  same reason being on aspirin.  Patient has so far received 4 doses of R-CHOP last of which was on March 30, 2017.  2-D echo Done on April 4, 2017 shows ejection fraction is greater than 70%.  Point we'll go ahead with a cycle of R CHOP today.  We will see him back here in 3 weeks on the day of sixth and final cycle of R CHOP after that we will refer him to Dr. Paulino Candelario for radiation therapy.  We will send a prescription for prednisone 100 mg by mouth daily for 5 days with one refill to his pharmacy today.  Patient was instructed to start taking prednisone from today.    2.  Second seizure episode on February 2, 2017.  Initially in November 2016 after diagnosis patient was on trauma done and developed seizure activity, for which patient was evaluated at [facility] in [place].  At that point evaluation was negative for any CNS cause and was believed was secondary from tramadol.   Was seen by Dr. loyola for seizures.  Had MRI done  which was negative for any intracranial abnormality.  Patient was encouraged to keep appointment with Dr. loyola.    3.  Mild anemia: Most likely secondary to chemotherapy.  Hemoglobin is 11.3 today.  We'll monitored with CBC for now.    4.  History of sebaceous carcinoma of left upper eyelid, status post surgery and radiation    5.  Health maintenance: Patient does not smoke.  Had a colonoscopy done on January 13, 2017 was negative for any malignancy.  He remains full code.    6.  Prescriptions: Patient is enough prescription for Compazine.  Prescription for prednisone 100 mg by mouth daily with 1 refill has been sent to his pharmacy today on April 20, 2017.       Conrado Ahumada MD  4/20/2017  9:26 AM

## 2017-04-20 NOTE — PROGRESS NOTES
CRISSW spoke with patient and his daughter, Elizabeth Gastelum, as he presents for infusion.  SW provided feedback related to financial resources and support with medical bills. SW offered education as to  [place] medicaid application, financial assistance program and spend down.  Pt. Is beginning to receive bills after Medicare with no secondary coverage.   NATASHA spoke with Marli in  regarding financial assistance and provided pt/family with application for assistance and instructions for follow up.

## 2017-04-21 ENCOUNTER — INFUSION (OUTPATIENT)
Dept: ONCOLOGY | Facility: HOSPITAL | Age: 77
End: 2017-04-21

## 2017-04-21 DIAGNOSIS — C83.30 DLBCL (DIFFUSE LARGE B CELL LYMPHOMA) (HCC): Primary | ICD-10-CM

## 2017-04-21 PROCEDURE — 25010000002 PEGFILGRASTIM 6 MG/0.6ML SOLUTION PREFILLED SYRINGE: Performed by: INTERNAL MEDICINE

## 2017-04-21 PROCEDURE — 96372 THER/PROPH/DIAG INJ SC/IM: CPT | Performed by: INTERNAL MEDICINE

## 2017-04-21 RX ADMIN — PEGFILGRASTIM 6 MG: 6 INJECTION SUBCUTANEOUS at 15:44

## 2017-05-11 ENCOUNTER — INFUSION (OUTPATIENT)
Dept: ONCOLOGY | Facility: HOSPITAL | Age: 77
End: 2017-05-11

## 2017-05-11 ENCOUNTER — OFFICE VISIT (OUTPATIENT)
Dept: ONCOLOGY | Facility: CLINIC | Age: 77
End: 2017-05-11

## 2017-05-11 VITALS
RESPIRATION RATE: 18 BRPM | HEART RATE: 55 BPM | WEIGHT: 174.9 LBS | SYSTOLIC BLOOD PRESSURE: 128 MMHG | DIASTOLIC BLOOD PRESSURE: 70 MMHG | TEMPERATURE: 97.3 F | BODY MASS INDEX: 25.83 KG/M2

## 2017-05-11 DIAGNOSIS — C83.30 DLBCL (DIFFUSE LARGE B CELL LYMPHOMA) (HCC): ICD-10-CM

## 2017-05-11 DIAGNOSIS — C83.30 DLBCL (DIFFUSE LARGE B CELL LYMPHOMA) (HCC): Primary | ICD-10-CM

## 2017-05-11 DIAGNOSIS — Z45.2 ENCOUNTER FOR VENOUS ACCESS DEVICE CARE: Primary | ICD-10-CM

## 2017-05-11 LAB
ALBUMIN SERPL-MCNC: 3.3 G/DL (ref 3.4–4.8)
ALBUMIN/GLOB SERPL: 1.3 G/DL (ref 1.1–1.8)
ALP SERPL-CCNC: 80 U/L (ref 38–126)
ALT SERPL W P-5'-P-CCNC: 25 U/L (ref 21–72)
ANION GAP SERPL CALCULATED.3IONS-SCNC: 8 MMOL/L (ref 5–15)
AST SERPL-CCNC: 25 U/L (ref 17–59)
BASOPHILS # BLD AUTO: 0.06 10*3/MM3 (ref 0–0.2)
BASOPHILS NFR BLD AUTO: 1.4 % (ref 0–2)
BILIRUB SERPL-MCNC: 0.3 MG/DL (ref 0.2–1.3)
BUN BLD-MCNC: 8 MG/DL (ref 7–21)
BUN/CREAT SERPL: 10.3 (ref 7–25)
CALCIUM SPEC-SCNC: 9 MG/DL (ref 8.4–10.2)
CHLORIDE SERPL-SCNC: 100 MMOL/L (ref 95–110)
CO2 SERPL-SCNC: 27 MMOL/L (ref 22–31)
CREAT BLD-MCNC: 0.78 MG/DL (ref 0.7–1.3)
DEPRECATED RDW RBC AUTO: 49.6 FL (ref 35.1–43.9)
EOSINOPHIL # BLD AUTO: 0.03 10*3/MM3 (ref 0–0.7)
EOSINOPHIL NFR BLD AUTO: 0.7 % (ref 0–7)
ERYTHROCYTE [DISTWIDTH] IN BLOOD BY AUTOMATED COUNT: 15.5 % (ref 11.5–14.5)
GFR SERPL CREATININE-BSD FRML MDRD: 97 ML/MIN/1.73 (ref 60–98)
GLOBULIN UR ELPH-MCNC: 2.6 GM/DL (ref 2.3–3.5)
GLUCOSE BLD-MCNC: 83 MG/DL (ref 60–100)
HCT VFR BLD AUTO: 30.2 % (ref 39–49)
HGB BLD-MCNC: 10.3 G/DL (ref 13.7–17.3)
IMM GRANULOCYTES # BLD: 0.03 10*3/MM3 (ref 0–0.02)
IMM GRANULOCYTES NFR BLD: 0.7 % (ref 0–0.5)
LYMPHOCYTES # BLD AUTO: 0.46 10*3/MM3 (ref 0.6–4.2)
LYMPHOCYTES NFR BLD AUTO: 11 % (ref 10–50)
MCH RBC QN AUTO: 29.9 PG (ref 26.5–34)
MCHC RBC AUTO-ENTMCNC: 34.1 G/DL (ref 31.5–36.3)
MCV RBC AUTO: 87.8 FL (ref 80–98)
MONOCYTES # BLD AUTO: 0.42 10*3/MM3 (ref 0–0.9)
MONOCYTES NFR BLD AUTO: 10 % (ref 0–12)
NEUTROPHILS # BLD AUTO: 3.19 10*3/MM3 (ref 2–8.6)
NEUTROPHILS NFR BLD AUTO: 76.2 % (ref 37–80)
PLATELET # BLD AUTO: 290 10*3/MM3 (ref 150–450)
PMV BLD AUTO: 9.2 FL (ref 8–12)
POTASSIUM BLD-SCNC: 4.2 MMOL/L (ref 3.5–5.1)
PROT SERPL-MCNC: 5.9 G/DL (ref 6.3–8.6)
RBC # BLD AUTO: 3.44 10*6/MM3 (ref 4.37–5.74)
SODIUM BLD-SCNC: 135 MMOL/L (ref 137–145)
WBC NRBC COR # BLD: 4.19 10*3/MM3 (ref 3.2–9.8)

## 2017-05-11 PROCEDURE — 25010000002 HEPARIN FLUSH (PORCINE) 100 UNIT/ML SOLUTION: Performed by: INTERNAL MEDICINE

## 2017-05-11 PROCEDURE — 25010000002 DOXORUBICIN PER 10 MG: Performed by: INTERNAL MEDICINE

## 2017-05-11 PROCEDURE — 96375 TX/PRO/DX INJ NEW DRUG ADDON: CPT | Performed by: INTERNAL MEDICINE

## 2017-05-11 PROCEDURE — 25010000002 RITUXIMAB 100 MG/10ML SOLUTION 10 ML VIAL: Performed by: INTERNAL MEDICINE

## 2017-05-11 PROCEDURE — 96413 CHEMO IV INFUSION 1 HR: CPT | Performed by: INTERNAL MEDICINE

## 2017-05-11 PROCEDURE — 96417 CHEMO IV INFUS EACH ADDL SEQ: CPT | Performed by: INTERNAL MEDICINE

## 2017-05-11 PROCEDURE — 25010000003 DEXAMETHASONE SODIUM PHOSPHATE 100 MG/10ML SOLUTION 10 ML VIAL: Performed by: INTERNAL MEDICINE

## 2017-05-11 PROCEDURE — 25010000002 RITUXIMAB 500 MG/50ML SOLUTION 50 ML VIAL: Performed by: INTERNAL MEDICINE

## 2017-05-11 PROCEDURE — 96415 CHEMO IV INFUSION ADDL HR: CPT | Performed by: INTERNAL MEDICINE

## 2017-05-11 PROCEDURE — 25010000002 VINCRISTINE PER 1 MG: Performed by: INTERNAL MEDICINE

## 2017-05-11 PROCEDURE — 85025 COMPLETE CBC W/AUTO DIFF WBC: CPT

## 2017-05-11 PROCEDURE — 96411 CHEMO IV PUSH ADDL DRUG: CPT | Performed by: INTERNAL MEDICINE

## 2017-05-11 PROCEDURE — 25010000002 DIPHENHYDRAMINE PER 50 MG: Performed by: INTERNAL MEDICINE

## 2017-05-11 PROCEDURE — 80053 COMPREHEN METABOLIC PANEL: CPT

## 2017-05-11 PROCEDURE — 99214 OFFICE O/P EST MOD 30 MIN: CPT | Performed by: INTERNAL MEDICINE

## 2017-05-11 PROCEDURE — 25010000002 CYCLOPHOSPHAMIDE PER 100 MG: Performed by: INTERNAL MEDICINE

## 2017-05-11 PROCEDURE — 25010000002 PALONOSETRON PER 25 MCG: Performed by: INTERNAL MEDICINE

## 2017-05-11 RX ORDER — SODIUM CHLORIDE 0.9 % (FLUSH) 0.9 %
10 SYRINGE (ML) INJECTION AS NEEDED
Status: DISCONTINUED | OUTPATIENT
Start: 2017-05-11 | End: 2017-05-11 | Stop reason: HOSPADM

## 2017-05-11 RX ORDER — DIPHENHYDRAMINE HYDROCHLORIDE 50 MG/ML
50 INJECTION INTRAMUSCULAR; INTRAVENOUS AS NEEDED
Status: CANCELLED | OUTPATIENT
Start: 2017-05-11

## 2017-05-11 RX ORDER — SODIUM CHLORIDE 9 MG/ML
250 INJECTION, SOLUTION INTRAVENOUS ONCE
Status: COMPLETED | OUTPATIENT
Start: 2017-05-11 | End: 2017-05-11

## 2017-05-11 RX ORDER — FAMOTIDINE 10 MG/ML
20 INJECTION, SOLUTION INTRAVENOUS AS NEEDED
Status: CANCELLED | OUTPATIENT
Start: 2017-05-11

## 2017-05-11 RX ORDER — SODIUM CHLORIDE 0.9 % (FLUSH) 0.9 %
10 SYRINGE (ML) INJECTION AS NEEDED
Status: CANCELLED | OUTPATIENT
Start: 2017-05-11

## 2017-05-11 RX ORDER — PALONOSETRON 0.05 MG/ML
0.25 INJECTION, SOLUTION INTRAVENOUS ONCE
Status: COMPLETED | OUTPATIENT
Start: 2017-05-11 | End: 2017-05-11

## 2017-05-11 RX ORDER — MEPERIDINE HYDROCHLORIDE 50 MG/ML
25 INJECTION INTRAMUSCULAR; INTRAVENOUS; SUBCUTANEOUS
Status: DISCONTINUED | OUTPATIENT
Start: 2017-05-11 | End: 2017-05-11 | Stop reason: HOSPADM

## 2017-05-11 RX ORDER — FAMOTIDINE 10 MG/ML
20 INJECTION, SOLUTION INTRAVENOUS AS NEEDED
Status: DISCONTINUED | OUTPATIENT
Start: 2017-05-11 | End: 2017-05-11 | Stop reason: HOSPADM

## 2017-05-11 RX ORDER — DOXORUBICIN HYDROCHLORIDE 2 MG/ML
50 INJECTION, SOLUTION INTRAVENOUS ONCE
Status: CANCELLED | OUTPATIENT
Start: 2017-05-11

## 2017-05-11 RX ORDER — PALONOSETRON 0.05 MG/ML
0.25 INJECTION, SOLUTION INTRAVENOUS ONCE
Status: CANCELLED | OUTPATIENT
Start: 2017-05-11

## 2017-05-11 RX ORDER — SODIUM CHLORIDE 9 MG/ML
250 INJECTION, SOLUTION INTRAVENOUS ONCE
Status: CANCELLED | OUTPATIENT
Start: 2017-05-11

## 2017-05-11 RX ORDER — DOXORUBICIN HYDROCHLORIDE 2 MG/ML
50 INJECTION, SOLUTION INTRAVENOUS ONCE
Status: COMPLETED | OUTPATIENT
Start: 2017-05-11 | End: 2017-05-11

## 2017-05-11 RX ORDER — ACETAMINOPHEN 325 MG/1
650 TABLET ORAL ONCE
Status: CANCELLED | OUTPATIENT
Start: 2017-05-11

## 2017-05-11 RX ORDER — DIPHENHYDRAMINE HYDROCHLORIDE 50 MG/ML
50 INJECTION INTRAMUSCULAR; INTRAVENOUS AS NEEDED
Status: DISCONTINUED | OUTPATIENT
Start: 2017-05-11 | End: 2017-05-11 | Stop reason: HOSPADM

## 2017-05-11 RX ORDER — ACETAMINOPHEN 325 MG/1
650 TABLET ORAL ONCE
Status: COMPLETED | OUTPATIENT
Start: 2017-05-11 | End: 2017-05-11

## 2017-05-11 RX ORDER — MEPERIDINE HYDROCHLORIDE 50 MG/ML
25 INJECTION INTRAMUSCULAR; INTRAVENOUS; SUBCUTANEOUS
Status: CANCELLED | OUTPATIENT
Start: 2017-05-11

## 2017-05-11 RX ADMIN — RITUXIMAB 750 MG: 10 INJECTION, SOLUTION INTRAVENOUS at 10:57

## 2017-05-11 RX ADMIN — PALONOSETRON HYDROCHLORIDE 0.25 MG: 0.25 INJECTION INTRAVENOUS at 09:29

## 2017-05-11 RX ADMIN — SODIUM CHLORIDE, PRESERVATIVE FREE 500 UNITS: 5 INJECTION INTRAVENOUS at 17:51

## 2017-05-11 RX ADMIN — Medication 10 ML: at 15:30

## 2017-05-11 RX ADMIN — Medication 10 ML: at 08:26

## 2017-05-11 RX ADMIN — CYCLOPHOSPHAMIDE 1490 MG: 1 INJECTION, POWDER, FOR SOLUTION INTRAVENOUS; ORAL at 13:05

## 2017-05-11 RX ADMIN — DOXORUBICIN HYDROCHLORIDE 100 MG: 2 INJECTION, SOLUTION INTRAVENOUS at 14:48

## 2017-05-11 RX ADMIN — DEXAMETHASONE SODIUM PHOSPHATE 12 MG: 10 INJECTION, SOLUTION INTRAMUSCULAR; INTRAVENOUS at 10:20

## 2017-05-11 RX ADMIN — ACETAMINOPHEN 650 MG: 325 TABLET ORAL at 09:28

## 2017-05-11 RX ADMIN — VINCRISTINE SULFATE 2 MG: 1 INJECTION, SOLUTION INTRAVENOUS at 14:14

## 2017-05-11 RX ADMIN — SODIUM CHLORIDE 250 ML: 9 INJECTION, SOLUTION INTRAVENOUS at 09:21

## 2017-05-11 RX ADMIN — DIPHENHYDRAMINE HYDROCHLORIDE 50 MG: 50 INJECTION INTRAMUSCULAR; INTRAVENOUS at 09:41

## 2017-05-12 ENCOUNTER — INFUSION (OUTPATIENT)
Dept: ONCOLOGY | Facility: HOSPITAL | Age: 77
End: 2017-05-12

## 2017-05-12 DIAGNOSIS — C83.30 DLBCL (DIFFUSE LARGE B CELL LYMPHOMA) (HCC): Primary | ICD-10-CM

## 2017-05-12 PROCEDURE — 96372 THER/PROPH/DIAG INJ SC/IM: CPT | Performed by: INTERNAL MEDICINE

## 2017-05-12 PROCEDURE — 25010000002 PEGFILGRASTIM 6 MG/0.6ML SOLUTION PREFILLED SYRINGE: Performed by: INTERNAL MEDICINE

## 2017-05-12 RX ADMIN — PEGFILGRASTIM 6 MG: 6 INJECTION SUBCUTANEOUS at 15:48

## 2017-06-05 ENCOUNTER — HOSPITAL ENCOUNTER (OUTPATIENT)
Dept: PET IMAGING | Facility: HOSPITAL | Age: 77
Discharge: HOME | End: 2017-06-05
Admitting: INTERNAL MEDICINE

## 2017-06-05 DIAGNOSIS — C83.30 DLBCL (DIFFUSE LARGE B CELL LYMPHOMA) (HCC): ICD-10-CM

## 2017-06-05 PROCEDURE — 78815 PET IMAGE W/CT SKULL-THIGH: CPT

## 2017-06-05 PROCEDURE — 0 FLUDEOXYGLUCOSE F18 SOLUTION: Performed by: INTERNAL MEDICINE

## 2017-06-05 PROCEDURE — A9552 F18 FDG: HCPCS | Performed by: INTERNAL MEDICINE

## 2017-06-05 RX ADMIN — FLUDEOXYGLUCOSE F18 1 DOSE: 300 INJECTION INTRAVENOUS at 08:50

## 2017-06-08 ENCOUNTER — OFFICE VISIT (OUTPATIENT)
Dept: ONCOLOGY | Facility: CLINIC | Age: 77
End: 2017-06-08

## 2017-06-08 ENCOUNTER — INFUSION (OUTPATIENT)
Dept: ONCOLOGY | Facility: HOSPITAL | Age: 77
End: 2017-06-08

## 2017-06-08 VITALS
DIASTOLIC BLOOD PRESSURE: 63 MMHG | RESPIRATION RATE: 16 BRPM | HEART RATE: 54 BPM | BODY MASS INDEX: 24.71 KG/M2 | WEIGHT: 167.3 LBS | SYSTOLIC BLOOD PRESSURE: 113 MMHG | TEMPERATURE: 97.3 F

## 2017-06-08 DIAGNOSIS — C83.30 DLBCL (DIFFUSE LARGE B CELL LYMPHOMA) (HCC): Primary | ICD-10-CM

## 2017-06-08 DIAGNOSIS — D69.6 THROMBOCYTOPENIA (HCC): ICD-10-CM

## 2017-06-08 DIAGNOSIS — Z45.2 ENCOUNTER FOR VENOUS ACCESS DEVICE CARE: ICD-10-CM

## 2017-06-08 DIAGNOSIS — D64.81 ANEMIA DUE TO ANTINEOPLASTIC CHEMOTHERAPY: ICD-10-CM

## 2017-06-08 DIAGNOSIS — T45.1X5A ANEMIA DUE TO ANTINEOPLASTIC CHEMOTHERAPY: ICD-10-CM

## 2017-06-08 PROBLEM — D64.9 ANEMIA: Status: ACTIVE | Noted: 2017-06-08

## 2017-06-08 LAB
ALBUMIN SERPL-MCNC: 3.3 G/DL (ref 3.4–4.8)
ALBUMIN/GLOB SERPL: 1.3 G/DL (ref 1.1–1.8)
ALP SERPL-CCNC: 73 U/L (ref 38–126)
ALT SERPL W P-5'-P-CCNC: 37 U/L (ref 21–72)
ANION GAP SERPL CALCULATED.3IONS-SCNC: 10 MMOL/L (ref 5–15)
AST SERPL-CCNC: 42 U/L (ref 17–59)
BASOPHILS # BLD AUTO: 0.09 10*3/MM3 (ref 0–0.2)
BASOPHILS NFR BLD AUTO: 2.6 % (ref 0–2)
BILIRUB SERPL-MCNC: 0.4 MG/DL (ref 0.2–1.3)
BUN BLD-MCNC: 10 MG/DL (ref 7–21)
BUN/CREAT SERPL: 12.2 (ref 7–25)
CALCIUM SPEC-SCNC: 9 MG/DL (ref 8.4–10.2)
CHLORIDE SERPL-SCNC: 103 MMOL/L (ref 95–110)
CO2 SERPL-SCNC: 23 MMOL/L (ref 22–31)
CREAT BLD-MCNC: 0.82 MG/DL (ref 0.7–1.3)
DEPRECATED RDW RBC AUTO: 49.5 FL (ref 35.1–43.9)
EOSINOPHIL # BLD AUTO: 0.13 10*3/MM3 (ref 0–0.7)
EOSINOPHIL NFR BLD AUTO: 3.7 % (ref 0–7)
ERYTHROCYTE [DISTWIDTH] IN BLOOD BY AUTOMATED COUNT: 14.8 % (ref 11.5–14.5)
GFR SERPL CREATININE-BSD FRML MDRD: 91 ML/MIN/1.73 (ref 42–98)
GLOBULIN UR ELPH-MCNC: 2.6 GM/DL (ref 2.3–3.5)
GLUCOSE BLD-MCNC: 87 MG/DL (ref 60–100)
HCT VFR BLD AUTO: 32.1 % (ref 39–49)
HGB BLD-MCNC: 10.6 G/DL (ref 13.7–17.3)
IMM GRANULOCYTES # BLD: 0.01 10*3/MM3 (ref 0–0.02)
IMM GRANULOCYTES NFR BLD: 0.3 % (ref 0–0.5)
LYMPHOCYTES # BLD AUTO: 0.54 10*3/MM3 (ref 0.6–4.2)
LYMPHOCYTES NFR BLD AUTO: 15.4 % (ref 10–50)
MCH RBC QN AUTO: 30.2 PG (ref 26.5–34)
MCHC RBC AUTO-ENTMCNC: 33 G/DL (ref 31.5–36.3)
MCV RBC AUTO: 91.5 FL (ref 80–98)
MONOCYTES # BLD AUTO: 0.49 10*3/MM3 (ref 0–0.9)
MONOCYTES NFR BLD AUTO: 14 % (ref 0–12)
NEUTROPHILS # BLD AUTO: 2.24 10*3/MM3 (ref 2–8.6)
NEUTROPHILS NFR BLD AUTO: 64 % (ref 37–80)
PLATELET # BLD AUTO: 119 10*3/MM3 (ref 150–450)
PMV BLD AUTO: 10.5 FL (ref 8–12)
POTASSIUM BLD-SCNC: 4.2 MMOL/L (ref 3.5–5.1)
PROT SERPL-MCNC: 5.9 G/DL (ref 6.3–8.6)
RBC # BLD AUTO: 3.51 10*6/MM3 (ref 4.37–5.74)
SODIUM BLD-SCNC: 136 MMOL/L (ref 137–145)
WBC NRBC COR # BLD: 3.5 10*3/MM3 (ref 3.2–9.8)

## 2017-06-08 PROCEDURE — G0463 HOSPITAL OUTPT CLINIC VISIT: HCPCS | Performed by: INTERNAL MEDICINE

## 2017-06-08 PROCEDURE — 36591 DRAW BLOOD OFF VENOUS DEVICE: CPT | Performed by: INTERNAL MEDICINE

## 2017-06-08 PROCEDURE — 80053 COMPREHEN METABOLIC PANEL: CPT

## 2017-06-08 PROCEDURE — 25010000002 HEPARIN FLUSH (PORCINE) 100 UNIT/ML SOLUTION: Performed by: INTERNAL MEDICINE

## 2017-06-08 PROCEDURE — 85025 COMPLETE CBC W/AUTO DIFF WBC: CPT

## 2017-06-08 PROCEDURE — 99214 OFFICE O/P EST MOD 30 MIN: CPT | Performed by: INTERNAL MEDICINE

## 2017-06-08 RX ORDER — SODIUM CHLORIDE 0.9 % (FLUSH) 0.9 %
10 SYRINGE (ML) INJECTION AS NEEDED
Status: DISCONTINUED | OUTPATIENT
Start: 2017-06-08 | End: 2017-06-08 | Stop reason: HOSPADM

## 2017-06-08 RX ORDER — SODIUM CHLORIDE 0.9 % (FLUSH) 0.9 %
10 SYRINGE (ML) INJECTION AS NEEDED
Status: CANCELLED | OUTPATIENT
Start: 2017-06-08

## 2017-06-08 RX ADMIN — Medication 10 ML: at 13:26

## 2017-06-08 RX ADMIN — SODIUM CHLORIDE, PRESERVATIVE FREE 500 UNITS: 5 INJECTION INTRAVENOUS at 13:26

## 2017-06-26 ENCOUNTER — CONSULT (OUTPATIENT)
Dept: RADIATION ONCOLOGY | Facility: HOSPITAL | Age: 77
End: 2017-06-26

## 2017-06-26 ENCOUNTER — HOSPITAL ENCOUNTER (OUTPATIENT)
Dept: RADIATION ONCOLOGY | Facility: HOSPITAL | Age: 77
Setting detail: RADIATION/ONCOLOGY SERIES
End: 2017-06-26

## 2017-06-26 VITALS
SYSTOLIC BLOOD PRESSURE: 137 MMHG | TEMPERATURE: 96.3 F | RESPIRATION RATE: 18 BRPM | DIASTOLIC BLOOD PRESSURE: 70 MMHG | HEART RATE: 51 BPM

## 2017-06-26 DIAGNOSIS — C83.30 DLBCL (DIFFUSE LARGE B CELL LYMPHOMA) (HCC): Primary | ICD-10-CM

## 2017-06-26 PROCEDURE — G0463 HOSPITAL OUTPT CLINIC VISIT: HCPCS | Performed by: RADIOLOGY

## 2017-06-26 PROCEDURE — 99214 OFFICE O/P EST MOD 30 MIN: CPT | Performed by: RADIOLOGY

## 2017-06-26 RX ORDER — POLYETHYLENE GLYCOL 3350 17 G/17G
17 POWDER, FOR SOLUTION ORAL DAILY
COMMUNITY
End: 2017-12-08

## 2017-06-26 NOTE — PROGRESS NOTES
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  ICD 10 Code: C83.30                                               History of Present Illness: Mr. 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 Mr. Steiner presented to Dr. Mary Ellen Zhou, his PCP, in October complaining of a one-month history of an enlarging left testicular mass. The patient was referred to Dr. Obrien for further evaluation, and a scrotal ultrasound on 11/9/16 revealed a 3.8 x 3.6 cm mass in the left testicle, with smaller satellite nodules in the testicle, along with an inhomogeneous appearance to the right testicle. The patient was placed on antibiotics (Levaquin) and tramadol, and on 11/10/16 suffered a seizure. He presented to the [facility] ER and was transferred to [facility] in [place] for further evaluation, which was negative for CNS abnormalities. On 11/18/16 the patient underwent a left orchiectomy per Dr. Elroy Obrien, with pathology revealing diffuse large B-cell lymphoma of the testicle. A CT of the abdomen on 12/9/16 revealed a left inguinal hydrocele and a 3.7 x 2.2 cm subcarinal lymph node, but no retroperitoneal or pelvic adenopathy. The patient was seen in consultation by Dr. Ahumada on 12/27/16. A PET/CT on 1/2/17 revealed hypermetabolic activity in the left inguinal canal (SUV 5.0) as well as intense hypermetabolic activity in a 2.2 cm round structure in the region of the ileocecal valve. The patient underwent a colonoscopy on 1/13/17 per Dr. Jeff Crabtree, with biopsies from the terminal ileum and  ileocecal valve negative for malignancy. Dr. Ahumada recommended a lumbar puncture for evaluation of the CSF, however, the patient takes aspirin to help control vertigo, and was not willing to discontinue his aspirin in order to have a lumbar puncture. The patient subsequently received 6 cycles of R-CHOP chemotherapy, from 1/17/17 through 5/11/17. A restaging PET/CT on 6/5/17 revealed a persistent focus of hypermetabolic activity in the left superior inguinal canal, with a decrease in intensity, from 5.0-3.5 SUV. The previously noted hypermetabolic activity at the ileocecal valve had resolved. The patient has now been referred to our clinic to discuss adjuvant radiation therapy.    (Old medical records were requested, reviewed, and summarized in the HPI)    Review of Systems   Constitutional: Positive for appetite change and fatigue.        Post 6 Cycles of R- CHOP   Neurological:        HX   Seizures       The remainder of his review of systems is otherwise negative.     Past Medical History:   Diagnosis Date   • B-cell lymphoma 12/2016    Left Testis   • Cortical senile cataract    • Diabetes mellitus    • Hypertension    • Lymphoma of testis 11/2016   • Seizures         Past Surgical History:   Procedure Laterality Date   • BACK SURGERY     • ORCHIECTOMY        Family History   Problem Relation Age of Onset   • Diabetes Mother    • Hypertension Sister    • Cancer Sister    • Hypertension Brother    • Cancer Brother         Social History     Social History   • Marital status:      Spouse name: N/A   • Number of children: N/A   • Years of education: N/A     Occupational History   • Not on file.     Social History Main Topics   • Smoking status: Former Smoker   • Smokeless tobacco: Never Used   • Alcohol use No   • Drug use: No   • Sexual activity: No     Other Topics Concern   • Not on file     Social History Narrative     Allergies: Crestor [rosuvastatin calcium]; Levofloxacin; Penicillins; Sulfa antibiotics;  and Tramadol   Prior to Admission medications    Medication Sig Start Date End Date Taking? Authorizing Provider   acetaminophen (TYLENOL) 325 MG tablet Take 2 tablets by mouth Every 4 (Four) Hours As Needed for mild pain (1-3) or fever. 2/20/17  Yes OSEI Munguia   aspirin 81 MG chewable tablet Chew 81 mg daily. 3/2/16  Yes Historical Provider, MD   atenolol (TENORMIN) 50 MG tablet Take 50 mg by mouth Daily. 12/13/16  Yes Historical Provider, MD   doxazosin (CARDURA) 4 MG tablet Take 4 mg by mouth every night. 3/2/16  Yes Historical Provider, MD   fluticasone (FLONASE) 50 MCG/ACT nasal spray 2 sprays into each nostril 2 (Two) Times a Day. Administer 2 sprays in each nostril for each dose.  3/2/16  Yes Historical Provider, MD   HYDROcodone-acetaminophen (NORCO) 7.5-325 MG per tablet Take 1 tablet by mouth Every 6 (Six) Hours As Needed (Takes sparingly). 12/7/16  Yes Historical Provider, MD   levETIRAcetam (KEPPRA) 500 MG tablet Take 1 tablet by mouth 2 (Two) Times a Day. 2/2/17  Yes Domingo Acosta MD   lisinopril (PRINIVIL,ZESTRIL) 10 MG tablet Take 20 mg by mouth Daily. Pt now takes 2 (10 mg) tabs = 20 mg 12/13/16  Yes Historical Provider, MD   ondansetron (ZOFRAN) 4 MG tablet Take 1 tablet by mouth 4 (Four) Times a Day As Needed for Nausea or Vomiting. 3/9/17  Yes Conrado Ahumada MD   polyethylene glycol (MIRALAX) packet Take 17 g by mouth Daily.   Yes Historical Provider, MD   predniSONE (DELTASONE) 50 MG tablet Take 2 tablets by mouth Daily. Daily for 5 days from starting chemotherapy 4/20/17  Yes Conrado Ahumada MD   prochlorperazine (COMPAZINE) 10 MG tablet Take 1 tablet by mouth Every 6 (Six) Hours As Needed for nausea or vomiting. 2/7/17  Yes Conrado Ahumada MD      Pain: (on a scale of 0-10)     Pain Score    06/26/17 1147   PainSc: 0-No pain       Quality of Life:  90 - Limited Activity  Advanced Care Plan: N     Physical Examination:  Vitals:     Vitals:    06/26/17 1147   BP: 137/70   Pulse: 51    Resp: 18   Temp: 96.3 °F (35.7 °C)       Height:    Weight:     There is no height or weight on file to calculate BMI.      Constitutional: The patient is a well-developed, well-nourished white  male in no acute distress.  Alert and oriented ×3.  Eyes: PERRLA.  EOMI.  ENMT:  Ears and nose WNL.  No lesions noted in the oral cavity or oropharynx.  Lymphatics: No cervical, supraclavicular, axillary, or inguinal lymphadenopathy is palpated.  CV: Regular rate and rhythm.  No murmurs, rubs, or gallops are appreciated.  Respiratory: Lungs clear to auscultation.  Breath sounds equal bilaterally.  GI: Abdomen soft, nontender, nondistended, with no hepatosplenomegaly or masses palpated.  : The left testicle is surgically absent. Surgical scar in the left inguinal area is well-healed. The right testicle is WNL.   Extremities: No clubbing, cyanosis, or edema.  Neurologic: Cranial nerves II through XII are grossly intact, with no focal neurological deficits noted on exam.  Psychiatric: Alert and oriented x3. Normal affect, with no anxiety or depression noted.    Radiographs : As discussed in the HPI  Pathology: As discussed in the HPI  Labs:   WBC   Date Value Ref Range Status   06/08/2017 3.50 3.20 - 9.80 10*3/mm3 Final     Hemoglobin   Date Value Ref Range Status   06/08/2017 10.6 (L) 13.7 - 17.3 g/dL Final     Hematocrit   Date Value Ref Range Status   06/08/2017 32.1 (L) 39.0 - 49.0 % Final     Platelets   Date Value Ref Range Status   06/08/2017 119 (L) 150 - 450 10*3/mm3 Final       ASSESSMENT/PLAN: : Mr. Sharan Steiner is a 76-year-old white male status post left inguinal orchiectomy followed by 6 cycles of R-CHOP chemotherapy for Stage IE diffuse large B cell lymphoma of the left testicle. I discussed with the patient and his daughter the fact that this is a rare presentation for lymphoma, and that patients with this type of presentation have an increased risk of recurrence, contralateral testicular involvement, as  well as CNS involvement, and that involved field radiation therapy along with radiation therapy to the contralateral testicle is recommended. After a lengthy discussion, the patient and his daughter have decided to decline adjuvant radiation therapy at this time. They both feel that they are making an informed decision. We will therefore see the patient back in our clinic on an as-needed basis. They plan to continue to follow closely with Dr. Ahumada, with whom I have discussed this case.    Sincerely,    Osmani Petersen MD  Radiation Oncology    Electronically signed by Osmani Petersen MD  6/26/2017 11:52 AM     cc: Dr. Conrado DE JESUS Dragon/Transcription Disclaimer: Much of this encounter note is an electronic transcription/translation of spoken language to printed text. The electronic translation of spoken language may permit erroneous, or at times, nonsensical words or phrases to be inadvertently transcribed; although I have reviewed the note for such errors, some may still exist.

## 2017-07-20 ENCOUNTER — INFUSION (OUTPATIENT)
Dept: ONCOLOGY | Facility: HOSPITAL | Age: 77
End: 2017-07-20

## 2017-07-20 DIAGNOSIS — Z45.2 ENCOUNTER FOR VENOUS ACCESS DEVICE CARE: Primary | ICD-10-CM

## 2017-07-20 PROCEDURE — 25010000002 HEPARIN FLUSH (PORCINE) 100 UNIT/ML SOLUTION: Performed by: INTERNAL MEDICINE

## 2017-07-20 PROCEDURE — 96523 IRRIG DRUG DELIVERY DEVICE: CPT | Performed by: INTERNAL MEDICINE

## 2017-07-20 RX ORDER — SODIUM CHLORIDE 0.9 % (FLUSH) 0.9 %
10 SYRINGE (ML) INJECTION AS NEEDED
Status: CANCELLED | OUTPATIENT
Start: 2017-07-20

## 2017-07-20 RX ORDER — SODIUM CHLORIDE 0.9 % (FLUSH) 0.9 %
10 SYRINGE (ML) INJECTION AS NEEDED
Status: DISCONTINUED | OUTPATIENT
Start: 2017-07-20 | End: 2017-07-20 | Stop reason: HOSPADM

## 2017-07-20 RX ADMIN — Medication 10 ML: at 14:42

## 2017-07-20 RX ADMIN — SODIUM CHLORIDE, PRESERVATIVE FREE 500 UNITS: 5 INJECTION INTRAVENOUS at 14:42

## 2017-08-31 ENCOUNTER — APPOINTMENT (OUTPATIENT)
Dept: ONCOLOGY | Facility: HOSPITAL | Age: 77
End: 2017-08-31

## 2017-09-05 ENCOUNTER — INFUSION (OUTPATIENT)
Dept: ONCOLOGY | Facility: HOSPITAL | Age: 77
End: 2017-09-05

## 2017-09-05 DIAGNOSIS — Z45.2 ENCOUNTER FOR VENOUS ACCESS DEVICE CARE: ICD-10-CM

## 2017-09-05 DIAGNOSIS — C83.30 DLBCL (DIFFUSE LARGE B CELL LYMPHOMA) (HCC): Primary | ICD-10-CM

## 2017-09-05 LAB
ALBUMIN SERPL-MCNC: 3.8 G/DL (ref 3.4–4.8)
ALBUMIN/GLOB SERPL: 1.6 G/DL (ref 1.1–1.8)
ALP SERPL-CCNC: 107 U/L (ref 38–126)
ALT SERPL W P-5'-P-CCNC: 32 U/L (ref 21–72)
ANION GAP SERPL CALCULATED.3IONS-SCNC: 10 MMOL/L (ref 5–15)
AST SERPL-CCNC: 30 U/L (ref 17–59)
BASOPHILS # BLD AUTO: 0.02 10*3/MM3 (ref 0–0.2)
BASOPHILS NFR BLD AUTO: 0.4 % (ref 0–2)
BILIRUB SERPL-MCNC: 0.6 MG/DL (ref 0.2–1.3)
BUN BLD-MCNC: 10 MG/DL (ref 7–21)
BUN/CREAT SERPL: 13.3 (ref 7–25)
CALCIUM SPEC-SCNC: 9.1 MG/DL (ref 8.4–10.2)
CHLORIDE SERPL-SCNC: 104 MMOL/L (ref 95–110)
CO2 SERPL-SCNC: 25 MMOL/L (ref 22–31)
CREAT BLD-MCNC: 0.75 MG/DL (ref 0.7–1.3)
DEPRECATED RDW RBC AUTO: 37.4 FL (ref 35.1–43.9)
EOSINOPHIL # BLD AUTO: 0.21 10*3/MM3 (ref 0–0.7)
EOSINOPHIL NFR BLD AUTO: 4.4 % (ref 0–7)
ERYTHROCYTE [DISTWIDTH] IN BLOOD BY AUTOMATED COUNT: 12 % (ref 11.5–14.5)
GFR SERPL CREATININE-BSD FRML MDRD: 101 ML/MIN/1.73 (ref 42–98)
GLOBULIN UR ELPH-MCNC: 2.4 GM/DL (ref 2.3–3.5)
GLUCOSE BLD-MCNC: 86 MG/DL (ref 60–100)
HCT VFR BLD AUTO: 35 % (ref 39–49)
HGB BLD-MCNC: 12.2 G/DL (ref 13.7–17.3)
IMM GRANULOCYTES # BLD: 0 10*3/MM3 (ref 0–0.02)
IMM GRANULOCYTES NFR BLD: 0 % (ref 0–0.5)
LDH SERPL-CCNC: 399 U/L (ref 313–618)
LYMPHOCYTES # BLD AUTO: 0.57 10*3/MM3 (ref 0.6–4.2)
LYMPHOCYTES NFR BLD AUTO: 12 % (ref 10–50)
MCH RBC QN AUTO: 29.9 PG (ref 26.5–34)
MCHC RBC AUTO-ENTMCNC: 34.9 G/DL (ref 31.5–36.3)
MCV RBC AUTO: 85.8 FL (ref 80–98)
MONOCYTES # BLD AUTO: 0.54 10*3/MM3 (ref 0–0.9)
MONOCYTES NFR BLD AUTO: 11.3 % (ref 0–12)
NEUTROPHILS # BLD AUTO: 3.42 10*3/MM3 (ref 2–8.6)
NEUTROPHILS NFR BLD AUTO: 71.9 % (ref 37–80)
PLATELET # BLD AUTO: 139 10*3/MM3 (ref 150–450)
PMV BLD AUTO: 10.3 FL (ref 8–12)
POTASSIUM BLD-SCNC: 4.1 MMOL/L (ref 3.5–5.1)
PROT SERPL-MCNC: 6.2 G/DL (ref 6.3–8.6)
RBC # BLD AUTO: 4.08 10*6/MM3 (ref 4.37–5.74)
SODIUM BLD-SCNC: 139 MMOL/L (ref 137–145)
WBC NRBC COR # BLD: 4.76 10*3/MM3 (ref 3.2–9.8)

## 2017-09-05 PROCEDURE — 85025 COMPLETE CBC W/AUTO DIFF WBC: CPT

## 2017-09-05 PROCEDURE — 36591 DRAW BLOOD OFF VENOUS DEVICE: CPT | Performed by: INTERNAL MEDICINE

## 2017-09-05 PROCEDURE — 80053 COMPREHEN METABOLIC PANEL: CPT

## 2017-09-05 PROCEDURE — 25010000002 HEPARIN FLUSH (PORCINE) 100 UNIT/ML SOLUTION: Performed by: INTERNAL MEDICINE

## 2017-09-05 PROCEDURE — 83615 LACTATE (LD) (LDH) ENZYME: CPT

## 2017-09-05 RX ORDER — SODIUM CHLORIDE 0.9 % (FLUSH) 0.9 %
10 SYRINGE (ML) INJECTION AS NEEDED
Status: DISCONTINUED | OUTPATIENT
Start: 2017-09-05 | End: 2017-09-05 | Stop reason: HOSPADM

## 2017-09-05 RX ORDER — SODIUM CHLORIDE 0.9 % (FLUSH) 0.9 %
10 SYRINGE (ML) INJECTION AS NEEDED
Status: CANCELLED | OUTPATIENT
Start: 2017-09-05

## 2017-09-05 RX ADMIN — SODIUM CHLORIDE, PRESERVATIVE FREE 500 UNITS: 5 INJECTION INTRAVENOUS at 10:31

## 2017-09-05 RX ADMIN — Medication 10 ML: at 10:31

## 2017-09-08 ENCOUNTER — APPOINTMENT (OUTPATIENT)
Dept: ONCOLOGY | Facility: HOSPITAL | Age: 77
End: 2017-09-08

## 2017-09-08 ENCOUNTER — OFFICE VISIT (OUTPATIENT)
Dept: ONCOLOGY | Facility: CLINIC | Age: 77
End: 2017-09-08

## 2017-09-08 VITALS
RESPIRATION RATE: 18 BRPM | TEMPERATURE: 98 F | SYSTOLIC BLOOD PRESSURE: 160 MMHG | BODY MASS INDEX: 28.19 KG/M2 | DIASTOLIC BLOOD PRESSURE: 100 MMHG | WEIGHT: 175.4 LBS | HEART RATE: 63 BPM | HEIGHT: 66 IN

## 2017-09-08 DIAGNOSIS — C83.30 DLBCL (DIFFUSE LARGE B CELL LYMPHOMA) (HCC): Primary | ICD-10-CM

## 2017-09-08 DIAGNOSIS — D69.6 THROMBOCYTOPENIA (HCC): ICD-10-CM

## 2017-09-08 DIAGNOSIS — D64.81 ANEMIA DUE TO ANTINEOPLASTIC CHEMOTHERAPY: ICD-10-CM

## 2017-09-08 DIAGNOSIS — T45.1X5A ANEMIA DUE TO ANTINEOPLASTIC CHEMOTHERAPY: ICD-10-CM

## 2017-09-08 PROCEDURE — G0463 HOSPITAL OUTPT CLINIC VISIT: HCPCS | Performed by: INTERNAL MEDICINE

## 2017-09-08 PROCEDURE — 99213 OFFICE O/P EST LOW 20 MIN: CPT | Performed by: INTERNAL MEDICINE

## 2017-09-08 NOTE — PROGRESS NOTES
DATE OF VISIT: 9/8/2017    REASON FOR VISIT:  Left testicular diffuse large B-cell lymphoma     HISTORY OF PRESENT ILLNESS:    77-year-old male with a past medical history significant for diffuse large B-cell lymphoma  Of  Left testis.  Status post 6 cycles of chemotherapy with R-CHOP last of which was on May 11, 2017.  Patient is here for follow-up visit today.  States his appetite is improved.  Denies any fever or chills or night sweats.  Denies any headache or dizziness.  Denies any seizure-like activity since last chemotherapy.      PAST MEDICAL HISTORY:    Past Medical History:   Diagnosis Date   • B-cell lymphoma 12/2016    Left Testis   • Cortical senile cataract    • Diabetes mellitus    • Eyelid cancer     Left  eyelid Sebacous Cancer    • Hypertension    • Lymphoma of testis 11/2016   • Seizures        SOCIAL HISTORY:    Social History   Substance Use Topics   • Smoking status: Former Smoker   • Smokeless tobacco: Never Used   • Alcohol use No       Surgical History :  Past Surgical History:   Procedure Laterality Date   • BACK SURGERY     • ORCHIECTOMY         ALLERGIES:    Allergies   Allergen Reactions   • Crestor [Rosuvastatin Calcium]    • Levofloxacin    • Penicillins    • Sulfa Antibiotics    • Tramadol        REVIEW OF SYSTEMS:      CONSTITUTIONAL: Positive for fatigue.States his appetite is improved.   No fever, chills, or night sweats.     HEENT:  No epistaxis, mouth sores, or difficulty swallowing.    RESPIRATORY:  No new shortness of breath or cough at present.    CARDIOVASCULAR:  No chest pain or palpitations.    GASTROINTESTINAL:   Denies any nausea today.  No abdominal pain or blood in the stool.    GENITOURINARY:  No dysuria or hematuria.    MUSCULOSKELETAL:  No any new back pain or arthralgias.     NEUROLOGICAL: Denies any new seizure activity since February 2, 2017.  No tingling or numbness. No new headache or dizziness.     LYMPHATICS:  Denies any abnormal swollen and anywhere in the  "body.    SKIN:  Denies any new skin rash.        PHYSICAL EXAMINATION:      VITAL SIGNS:    /100 Comment: manual  Pulse 63  Temp 98 °F (36.7 °C) (Temporal Artery )   Resp 18  Ht 66\" (167.6 cm)  Wt 175 lb 6.4 oz (79.6 kg)  BMI 28.31 kg/m2    GENERAL:  Not in any distress.     EYES:  Mild pallor. No icterus.    NECK:  No adenopathy in cervical or supraclavicular area.    RESPIRATORY:  Fair air entry bilaterally. No rhonchi or wheezing.    CARDIOVASCULAR:  S1, S2. Regular rate and rhythm.  Port-A-Cath present on right chest wall.    ABDOMEN:  Soft, nontender. Bowel sounds present.  No organomegaly appreciated.    EXTREMITIES:  No edema.  No Calf tenderness.    NEUROLOGICAL:  Alert, awake, oriented x3. No motor or sensory deficits.          DIAGNOSTIC DATA:    Glucose   Date Value Ref Range Status   09/05/2017 86 60 - 100 mg/dL Final     Sodium   Date Value Ref Range Status   09/05/2017 139 137 - 145 mmol/L Final     Potassium   Date Value Ref Range Status   09/05/2017 4.1 3.5 - 5.1 mmol/L Final     CO2   Date Value Ref Range Status   09/05/2017 25.0 22.0 - 31.0 mmol/L Final     Chloride   Date Value Ref Range Status   09/05/2017 104 95 - 110 mmol/L Final     Anion Gap   Date Value Ref Range Status   09/05/2017 10.0 5.0 - 15.0 mmol/L Final     Creatinine   Date Value Ref Range Status   09/05/2017 0.75 0.70 - 1.30 mg/dL Final     BUN   Date Value Ref Range Status   09/05/2017 10 7 - 21 mg/dL Final     BUN/Creatinine Ratio   Date Value Ref Range Status   09/05/2017 13.3 7.0 - 25.0 Final     Calcium   Date Value Ref Range Status   09/05/2017 9.1 8.4 - 10.2 mg/dL Final     eGFR Non  Amer   Date Value Ref Range Status   09/05/2017 101 (H) 42 - 98 mL/min/1.73 Final     Alkaline Phosphatase   Date Value Ref Range Status   09/05/2017 107 38 - 126 U/L Final     Total Protein   Date Value Ref Range Status   09/05/2017 6.2 (L) 6.3 - 8.6 g/dL Final     ALT (SGPT)   Date Value Ref Range Status   09/05/2017 32 21 - " 72 U/L Final     AST (SGOT)   Date Value Ref Range Status   09/05/2017 30 17 - 59 U/L Final     Total Bilirubin   Date Value Ref Range Status   09/05/2017 0.6 0.2 - 1.3 mg/dL Final     Albumin   Date Value Ref Range Status   09/05/2017 3.80 3.40 - 4.80 g/dL Final     Globulin   Date Value Ref Range Status   09/05/2017 2.4 2.3 - 3.5 gm/dL Final     A/G Ratio   Date Value Ref Range Status   09/05/2017 1.6 1.1 - 1.8 g/dL Final     Lab Results   Component Value Date    WBC 4.76 09/05/2017    HGB 12.2 (L) 09/05/2017    HCT 35.0 (L) 09/05/2017    MCV 85.8 09/05/2017     (L) 09/05/2017     Lab Results   Component Value Date    NEUTROABS 3.42 09/05/2017     Lab Results   Component Value Date    AFPTM 2.7 12/30/2016    HCGQUANT 1 12/30/2016   ]    PATHOLOGY:  Pathology report from November 2016 shows:  FINAL DIAGNOSIS:  LEFT TESTIS:       MALIGNANT LYMPHOMA, DIFFUSE LARGE B-CELL.    COMMENT:  The following special stains were performed on block A5:  AE1-AE3  negative, GAL negative, LCA positive and CD20 positive.        RADIOLOGY DATA :  PET/CT done on June 5, 2017 showed:  IMPRESSION:  CONCLUSION:  1. Resolution of pathologic increased uptake ileocecal valve seen  on prior PET/CT examination.     Postsurgical changes, scarring left inguinal region. Persistent  focus of pinpoint increased F-18 FDG glucose uptake left superior  inguinal canal, uncertain significance. Intensity however is  slightly less than on prior exam. SUV measurements are now 3.5.  Previously 5.0. The PET/CT examination is otherwise unremarkable.         ASSESSMENT AND PLAN:      1.  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  Diagnosed in November 2016.  Patient had a PET/CT done which was negative for any abnormal activity.   patient could not have a complete staging workup with a lumbar puncture ,CSF analysis secondary to being on aspirin.  Patient received 6 round of chemotherapy with R-CHOP from January 17, 2016 until May 11, 2016.   PET/CT done after 6 cycles of chemotherapy does not show any evidence of any residual disease.  Patient was seen by Dr. Petersen on June 26, 2017 for evaluation of radiation.  Patient refused to get radiation treatment done.  Earlier in the course of treatment patient did not get intrathecal chemotherapy secondary to being on aspirin and patient refused to get off aspirin.  At this point we'll continue with a clinical surveillance.  We will see him back in about 3 months with a repeat CBC and CMP and LDH on that day.  Patient was encouraged to call us if he starts having any unusual fever or night sweats or weight loss or any abnormal swollen glands anywhere in the body.    2.  Mild anemia and thrombocytopenia: Most likely secondary to chemotherapy plus or minus Keppra.  Hemoglobin is improved to 12.2 and platelet count is improved  To 139,000. At this point we'll monitored with CBC.  Patient was instructed to come to emergency room if he starts having any bleeding issues.    3.  Second seizure episode on February 2, 2017.  Initially in November 2016 after diagnosis patient was on trauma done and developed seizure activity, for which patient was evaluated at [facility] in [place].  At that point evaluation was negative for any CNS cause and was believed was secondary from tramadol.   Was seen by Dr. loyola for seizures.  Had MRI done  which was negative for any intracranial abnormality.  Patient was encouraged to keep appointment with Dr. loyola.    4.  History of sebaceous carcinoma of left upper eyelid, status post surgery and radiation    5.  Health maintenance: Patient does not smoke.  Had a colonoscopy done on January 13, 2017 was negative for any malignancy.  He remains full code.    6.  Prescriptions: Patient is enough prescription for Compazine .       Conrado Ahumada MD  9/8/2017  2:26 PM

## 2017-10-25 ENCOUNTER — INFUSION (OUTPATIENT)
Dept: ONCOLOGY | Facility: HOSPITAL | Age: 77
End: 2017-10-25

## 2017-10-25 DIAGNOSIS — Z45.2 ENCOUNTER FOR VENOUS ACCESS DEVICE CARE: Primary | ICD-10-CM

## 2017-10-25 PROCEDURE — 25010000002 HEPARIN FLUSH (PORCINE) 100 UNIT/ML SOLUTION: Performed by: INTERNAL MEDICINE

## 2017-10-25 PROCEDURE — 96523 IRRIG DRUG DELIVERY DEVICE: CPT | Performed by: INTERNAL MEDICINE

## 2017-10-25 RX ORDER — SODIUM CHLORIDE 0.9 % (FLUSH) 0.9 %
10 SYRINGE (ML) INJECTION AS NEEDED
Status: DISCONTINUED | OUTPATIENT
Start: 2017-10-25 | End: 2017-10-25 | Stop reason: HOSPADM

## 2017-10-25 RX ORDER — SODIUM CHLORIDE 0.9 % (FLUSH) 0.9 %
10 SYRINGE (ML) INJECTION AS NEEDED
Status: CANCELLED | OUTPATIENT
Start: 2017-10-25

## 2017-10-25 RX ADMIN — Medication 10 ML: at 09:58

## 2017-10-25 RX ADMIN — SODIUM CHLORIDE, PRESERVATIVE FREE 500 UNITS: 5 INJECTION INTRAVENOUS at 09:58

## 2017-12-08 ENCOUNTER — OFFICE VISIT (OUTPATIENT)
Dept: ONCOLOGY | Facility: CLINIC | Age: 77
End: 2017-12-08

## 2017-12-08 ENCOUNTER — LAB (OUTPATIENT)
Dept: ONCOLOGY | Facility: HOSPITAL | Age: 77
End: 2017-12-08

## 2017-12-08 VITALS
BODY MASS INDEX: 29.73 KG/M2 | RESPIRATION RATE: 16 BRPM | HEART RATE: 53 BPM | WEIGHT: 184.97 LBS | DIASTOLIC BLOOD PRESSURE: 89 MMHG | HEIGHT: 66 IN | SYSTOLIC BLOOD PRESSURE: 195 MMHG | TEMPERATURE: 97.5 F

## 2017-12-08 DIAGNOSIS — C83.30 DLBCL (DIFFUSE LARGE B CELL LYMPHOMA) (HCC): Primary | ICD-10-CM

## 2017-12-08 DIAGNOSIS — T45.1X5A ANEMIA DUE TO ANTINEOPLASTIC CHEMOTHERAPY: Primary | ICD-10-CM

## 2017-12-08 DIAGNOSIS — D64.81 ANEMIA DUE TO ANTINEOPLASTIC CHEMOTHERAPY: ICD-10-CM

## 2017-12-08 DIAGNOSIS — D69.6 THROMBOCYTOPENIA (HCC): ICD-10-CM

## 2017-12-08 DIAGNOSIS — T45.1X5A ANEMIA DUE TO ANTINEOPLASTIC CHEMOTHERAPY: ICD-10-CM

## 2017-12-08 DIAGNOSIS — C83.30 DIFFUSE LARGE B-CELL LYMPHOMA, UNSPECIFIED BODY REGION (HCC): ICD-10-CM

## 2017-12-08 DIAGNOSIS — D64.81 ANEMIA DUE TO ANTINEOPLASTIC CHEMOTHERAPY: Primary | ICD-10-CM

## 2017-12-08 DIAGNOSIS — Z45.2 ENCOUNTER FOR VENOUS ACCESS DEVICE CARE: ICD-10-CM

## 2017-12-08 LAB
ALBUMIN SERPL-MCNC: 4 G/DL (ref 3.4–4.8)
ALBUMIN/GLOB SERPL: 1.5 G/DL (ref 1.1–1.8)
ALP SERPL-CCNC: 101 U/L (ref 38–126)
ALT SERPL W P-5'-P-CCNC: 32 U/L (ref 21–72)
ANION GAP SERPL CALCULATED.3IONS-SCNC: 8 MMOL/L (ref 5–15)
AST SERPL-CCNC: 33 U/L (ref 17–59)
BASOPHILS # BLD AUTO: 0.03 10*3/MM3 (ref 0–0.2)
BASOPHILS NFR BLD AUTO: 0.5 % (ref 0–2)
BILIRUB SERPL-MCNC: 0.5 MG/DL (ref 0.2–1.3)
BUN BLD-MCNC: 11 MG/DL (ref 7–21)
BUN/CREAT SERPL: 12.9 (ref 7–25)
CALCIUM SPEC-SCNC: 9.4 MG/DL (ref 8.4–10.2)
CHLORIDE SERPL-SCNC: 101 MMOL/L (ref 95–110)
CO2 SERPL-SCNC: 28 MMOL/L (ref 22–31)
CREAT BLD-MCNC: 0.85 MG/DL (ref 0.7–1.3)
DEPRECATED RDW RBC AUTO: 38.5 FL (ref 35.1–43.9)
EOSINOPHIL # BLD AUTO: 0.27 10*3/MM3 (ref 0–0.7)
EOSINOPHIL NFR BLD AUTO: 4.8 % (ref 0–7)
ERYTHROCYTE [DISTWIDTH] IN BLOOD BY AUTOMATED COUNT: 12.7 % (ref 11.5–14.5)
FERRITIN SERPL-MCNC: 55.3 NG/ML (ref 17.9–464)
FOLATE SERPL-MCNC: >20 NG/ML (ref 2.76–21)
GFR SERPL CREATININE-BSD FRML MDRD: 87 ML/MIN/1.73 (ref 42–98)
GLOBULIN UR ELPH-MCNC: 2.7 GM/DL (ref 2.3–3.5)
GLUCOSE BLD-MCNC: 87 MG/DL (ref 60–100)
HCT VFR BLD AUTO: 34.5 % (ref 39–49)
HGB BLD-MCNC: 12.1 G/DL (ref 13.7–17.3)
IMM GRANULOCYTES # BLD: 0.01 10*3/MM3 (ref 0–0.02)
IMM GRANULOCYTES NFR BLD: 0.2 % (ref 0–0.5)
IRON 24H UR-MRATE: 82 MCG/DL (ref 49–181)
IRON SATN MFR SERPL: 22 % (ref 20–55)
LDH SERPL-CCNC: 451 U/L (ref 313–618)
LYMPHOCYTES # BLD AUTO: 1.04 10*3/MM3 (ref 0.6–4.2)
LYMPHOCYTES NFR BLD AUTO: 18.5 % (ref 10–50)
MCH RBC QN AUTO: 29.2 PG (ref 26.5–34)
MCHC RBC AUTO-ENTMCNC: 35.1 G/DL (ref 31.5–36.3)
MCV RBC AUTO: 83.3 FL (ref 80–98)
MONOCYTES # BLD AUTO: 0.54 10*3/MM3 (ref 0–0.9)
MONOCYTES NFR BLD AUTO: 9.6 % (ref 0–12)
NEUTROPHILS # BLD AUTO: 3.72 10*3/MM3 (ref 2–8.6)
NEUTROPHILS NFR BLD AUTO: 66.4 % (ref 37–80)
PLATELET # BLD AUTO: 172 10*3/MM3 (ref 150–450)
PMV BLD AUTO: 9.6 FL (ref 8–12)
POTASSIUM BLD-SCNC: 4.3 MMOL/L (ref 3.5–5.1)
PROT SERPL-MCNC: 6.7 G/DL (ref 6.3–8.6)
RBC # BLD AUTO: 4.14 10*6/MM3 (ref 4.37–5.74)
SODIUM BLD-SCNC: 137 MMOL/L (ref 137–145)
TIBC SERPL-MCNC: 378 MCG/DL (ref 261–462)
VIT B12 BLD-MCNC: 298 PG/ML (ref 239–931)
WBC NRBC COR # BLD: 5.61 10*3/MM3 (ref 3.2–9.8)

## 2017-12-08 PROCEDURE — 83615 LACTATE (LD) (LDH) ENZYME: CPT

## 2017-12-08 PROCEDURE — 82607 VITAMIN B-12: CPT | Performed by: INTERNAL MEDICINE

## 2017-12-08 PROCEDURE — 82728 ASSAY OF FERRITIN: CPT | Performed by: INTERNAL MEDICINE

## 2017-12-08 PROCEDURE — 83550 IRON BINDING TEST: CPT | Performed by: INTERNAL MEDICINE

## 2017-12-08 PROCEDURE — 83540 ASSAY OF IRON: CPT | Performed by: INTERNAL MEDICINE

## 2017-12-08 PROCEDURE — 99214 OFFICE O/P EST MOD 30 MIN: CPT | Performed by: INTERNAL MEDICINE

## 2017-12-08 PROCEDURE — 85025 COMPLETE CBC W/AUTO DIFF WBC: CPT

## 2017-12-08 PROCEDURE — G0463 HOSPITAL OUTPT CLINIC VISIT: HCPCS | Performed by: INTERNAL MEDICINE

## 2017-12-08 PROCEDURE — 36591 DRAW BLOOD OFF VENOUS DEVICE: CPT | Performed by: INTERNAL MEDICINE

## 2017-12-08 PROCEDURE — 25010000002 HEPARIN FLUSH (PORCINE) 100 UNIT/ML SOLUTION: Performed by: INTERNAL MEDICINE

## 2017-12-08 PROCEDURE — 80053 COMPREHEN METABOLIC PANEL: CPT

## 2017-12-08 PROCEDURE — 82746 ASSAY OF FOLIC ACID SERUM: CPT | Performed by: INTERNAL MEDICINE

## 2017-12-08 RX ORDER — SODIUM CHLORIDE 0.9 % (FLUSH) 0.9 %
10 SYRINGE (ML) INJECTION AS NEEDED
Status: CANCELLED | OUTPATIENT
Start: 2018-03-09

## 2017-12-08 RX ORDER — LANOLIN ALCOHOL/MO/W.PET/CERES
1000 CREAM (GRAM) TOPICAL DAILY
Qty: 90 TABLET | Refills: 1 | Status: SHIPPED | OUTPATIENT
Start: 2017-12-08 | End: 2019-07-09

## 2017-12-08 RX ORDER — SODIUM CHLORIDE 0.9 % (FLUSH) 0.9 %
10 SYRINGE (ML) INJECTION AS NEEDED
Status: DISCONTINUED | OUTPATIENT
Start: 2017-12-08 | End: 2017-12-08 | Stop reason: HOSPADM

## 2017-12-08 RX ADMIN — Medication 10 ML: at 14:38

## 2017-12-08 RX ADMIN — SODIUM CHLORIDE, PRESERVATIVE FREE 500 UNITS: 5 INJECTION INTRAVENOUS at 15:05

## 2017-12-09 NOTE — PROGRESS NOTES
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  Status post 6 cycles of chemotherapy with R-CHOP last of which was on May 11, 2017.  Patient is here for follow-up visit today.  States his appetite is improved.  Has gained 6 pounds since last clinic visit.  Denies any fever or chills or night sweats.  Denies any headache or dizziness.  Denies any seizure-like activity since last chemotherapy.      PAST MEDICAL HISTORY:    Past Medical History:   Diagnosis Date   • B-cell lymphoma 12/2016    Left Testis   • Cortical senile cataract    • Diabetes mellitus    • Eyelid cancer     Left  eyelid Sebacous Cancer    • Hypertension    • Lymphoma of testis 11/2016   • Seizures        SOCIAL HISTORY:    Social History   Substance Use Topics   • Smoking status: Former Smoker   • Smokeless tobacco: Never Used   • Alcohol use No       Surgical History :  Past Surgical History:   Procedure Laterality Date   • BACK SURGERY     • ORCHIECTOMY         ALLERGIES:    Allergies   Allergen Reactions   • Crestor [Rosuvastatin Calcium]    • Levofloxacin    • Penicillins    • Sulfa Antibiotics    • Tramadol        REVIEW OF SYSTEMS:      CONSTITUTIONAL: Positive for fatigue.States his appetite is improved. Has gained 6 pounds since last clinic visit.  No fever, chills, or night sweats.     HEENT:  No epistaxis, mouth sores, or difficulty swallowing.    RESPIRATORY:  No new shortness of breath or cough at present.    CARDIOVASCULAR:  No chest pain or palpitations.    GASTROINTESTINAL:   Denies any nausea today.  No abdominal pain or blood in the stool.    GENITOURINARY:  No dysuria or hematuria.    MUSCULOSKELETAL:  No any new back pain or arthralgias.     NEUROLOGICAL: Denies any new seizure activity since February 2, 2017.  No tingling or numbness. No new  "headache or dizziness.     LYMPHATICS:  Denies any abnormal swollen and anywhere in the body.    SKIN:  Denies any new skin rash.        PHYSICAL EXAMINATION:      VITAL SIGNS:    BP (!) 195/89  Pulse 53  Temp 97.5 °F (36.4 °C) (Temporal Artery )   Resp 16  Ht 167.6 cm (65.98\")  Wt 83.9 kg (184 lb 15.5 oz)  BMI 29.87 kg/m2    GENERAL:  Not in any distress.     EYES:  Mild pallor. No icterus.    NECK:  No adenopathy in cervical or supraclavicular area.    RESPIRATORY:  Fair air entry bilaterally. No rhonchi or wheezing.    CARDIOVASCULAR:  S1, S2. Regular rate and rhythm.  Port-A-Cath present on right chest wall.    ABDOMEN:  Soft, nontender. Bowel sounds present.  No organomegaly appreciated.    EXTREMITIES:  No edema.  No Calf tenderness.    NEUROLOGICAL:  Alert, awake, oriented x3. No motor or sensory deficits.          DIAGNOSTIC DATA:    Glucose   Date Value Ref Range Status   12/08/2017 87 60 - 100 mg/dL Final     Sodium   Date Value Ref Range Status   12/08/2017 137 137 - 145 mmol/L Final     Potassium   Date Value Ref Range Status   12/08/2017 4.3 3.5 - 5.1 mmol/L Final     CO2   Date Value Ref Range Status   12/08/2017 28.0 22.0 - 31.0 mmol/L Final     Chloride   Date Value Ref Range Status   12/08/2017 101 95 - 110 mmol/L Final     Anion Gap   Date Value Ref Range Status   12/08/2017 8.0 5.0 - 15.0 mmol/L Final     Creatinine   Date Value Ref Range Status   12/08/2017 0.85 0.70 - 1.30 mg/dL Final     BUN   Date Value Ref Range Status   12/08/2017 11 7 - 21 mg/dL Final     BUN/Creatinine Ratio   Date Value Ref Range Status   12/08/2017 12.9 7.0 - 25.0 Final     Calcium   Date Value Ref Range Status   12/08/2017 9.4 8.4 - 10.2 mg/dL Final     eGFR Non  Amer   Date Value Ref Range Status   12/08/2017 87 42 - 98 mL/min/1.73 Final     Alkaline Phosphatase   Date Value Ref Range Status   12/08/2017 101 38 - 126 U/L Final     Total Protein   Date Value Ref Range Status   12/08/2017 6.7 6.3 - 8.6 g/dL " Final     ALT (SGPT)   Date Value Ref Range Status   12/08/2017 32 21 - 72 U/L Final     AST (SGOT)   Date Value Ref Range Status   12/08/2017 33 17 - 59 U/L Final     Total Bilirubin   Date Value Ref Range Status   12/08/2017 0.5 0.2 - 1.3 mg/dL Final     Albumin   Date Value Ref Range Status   12/08/2017 4.00 3.40 - 4.80 g/dL Final     Globulin   Date Value Ref Range Status   12/08/2017 2.7 2.3 - 3.5 gm/dL Final     A/G Ratio   Date Value Ref Range Status   12/08/2017 1.5 1.1 - 1.8 g/dL Final     Lab Results   Component Value Date    WBC 5.61 12/08/2017    HGB 12.1 (L) 12/08/2017    HCT 34.5 (L) 12/08/2017    MCV 83.3 12/08/2017     12/08/2017     Lab Results   Component Value Date    NEUTROABS 3.72 12/08/2017    IRON 82 12/08/2017    TIBC 378 12/08/2017    LABIRON 22 12/08/2017    FERRITIN 55.30 12/08/2017    UVAIBLFH90 298 12/08/2017    FOLATE >20.00 12/08/2017     Lab Results   Component Value Date    AFPTM 2.7 12/30/2016    HCGQUANT 1 12/30/2016   ]    PATHOLOGY:  Pathology report from November 2016 shows:  FINAL DIAGNOSIS:  LEFT TESTIS:       MALIGNANT LYMPHOMA, DIFFUSE LARGE B-CELL.    COMMENT:  The following special stains were performed on block A5:  AE1-AE3  negative, GAL negative, LCA positive and CD20 positive.        RADIOLOGY DATA :  PET/CT done on June 5, 2017 showed:  IMPRESSION:  CONCLUSION:  1. Resolution of pathologic increased uptake ileocecal valve seen  on prior PET/CT examination.     Postsurgical changes, scarring left inguinal region. Persistent  focus of pinpoint increased F-18 FDG glucose uptake left superior  inguinal canal, uncertain significance. Intensity however is  slightly less than on prior exam. SUV measurements are now 3.5.  Previously 5.0. The PET/CT examination is otherwise unremarkable.         ASSESSMENT AND PLAN:      1.  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  Diagnosed in November 2016.  Patient had a PET/CT done which was negative for any abnormal  activity.   patient could not have a complete staging workup with a lumbar puncture ,CSF analysis secondary to being on aspirin.  Patient received 6 round of chemotherapy with R-CHOP from January 17, 2016 until May 11, 2016.  PET/CT done after 6 cycles of chemotherapy does not show any evidence of any residual disease.  Patient was seen by Dr. Petersen on June 26, 2017 for evaluation of radiation.  Patient refused to get radiation treatment done.  Earlier in the course of treatment patient did not get intrathecal chemotherapy secondary to being on aspirin and patient refused to get off aspirin.  At this point we'll continue with a clinical surveillance.  We will see him back in about 3 months with a repeat CBC and CMP and LDH on that day.  Patient was encouraged to call us if he starts having any unusual fever or night sweats or weight loss or any abnormal swollen glands anywhere in the body.    2.  Mild anemia and thrombocytopenia: Most likely secondary to chemotherapy plus or minus Keppra.  Hemoglobin is still 12.1.  Platelet count is improved 272,000.  Anemia workup done earlier today shows vitamin B12 of 293.  We will start patient on vitamin B12 thousand micrograms by mouth daily.  Prescription for vitamin B12 has been sent to his pharmacy today.    3.  Second seizure episode on February 2, 2017.  Initially in November 2016 after diagnosis patient was on trauma done and developed seizure activity, for which patient was evaluated at [facility] in [place].  At that point evaluation was negative for any CNS cause and was believed was secondary from tramadol.   Was seen by Dr. loyola for seizures.  Had MRI done  which was negative for any intracranial abnormality.  Patient was encouraged to keep appointment with Dr. loyola.    4.  History of sebaceous carcinoma of left upper eyelid, status post surgery and radiation    5.  Health maintenance: Patient does not smoke.  Had a colonoscopy done on January 13,  2017 was negative for any malignancy.  He remains full code.    6.  Prescriptions: Prescription for vitamin B12 thousand micrograms by mouth daily has been sent to his pharmacy today on December 8, 2017.       Conrado Ahumada MD  12/8/2017  6:08 PM

## 2017-12-11 ENCOUNTER — TELEPHONE (OUTPATIENT)
Dept: ONCOLOGY | Facility: HOSPITAL | Age: 77
End: 2017-12-11

## 2017-12-11 NOTE — TELEPHONE ENCOUNTER
----- Message from Conrado Ahumada MD sent at 12/8/2017  6:12 PM CST -----  Prescription patient for vitamin B12 thousand micrograms by mouth daily has been sent to patient's pharmacy today.  Please let patient know.  Thank you

## 2018-03-09 ENCOUNTER — LAB (OUTPATIENT)
Dept: ONCOLOGY | Facility: HOSPITAL | Age: 78
End: 2018-03-09

## 2018-03-09 ENCOUNTER — OFFICE VISIT (OUTPATIENT)
Dept: ONCOLOGY | Facility: CLINIC | Age: 78
End: 2018-03-09

## 2018-03-09 VITALS
TEMPERATURE: 97.9 F | HEIGHT: 66 IN | SYSTOLIC BLOOD PRESSURE: 167 MMHG | BODY MASS INDEX: 31.14 KG/M2 | RESPIRATION RATE: 16 BRPM | WEIGHT: 193.78 LBS | DIASTOLIC BLOOD PRESSURE: 97 MMHG | HEART RATE: 52 BPM

## 2018-03-09 DIAGNOSIS — Z95.828 PORT CATHETER IN PLACE: Primary | ICD-10-CM

## 2018-03-09 DIAGNOSIS — D64.81 ANEMIA DUE TO ANTINEOPLASTIC CHEMOTHERAPY: Primary | ICD-10-CM

## 2018-03-09 DIAGNOSIS — C83.30 DIFFUSE LARGE B-CELL LYMPHOMA, UNSPECIFIED BODY REGION (HCC): ICD-10-CM

## 2018-03-09 DIAGNOSIS — Z45.2 ENCOUNTER FOR VENOUS ACCESS DEVICE CARE: ICD-10-CM

## 2018-03-09 DIAGNOSIS — T45.1X5A ANEMIA DUE TO ANTINEOPLASTIC CHEMOTHERAPY: Primary | ICD-10-CM

## 2018-03-09 LAB
ALBUMIN SERPL-MCNC: 3.9 G/DL (ref 3.4–4.8)
ALBUMIN/GLOB SERPL: 1.4 G/DL (ref 1.1–1.8)
ALP SERPL-CCNC: 110 U/L (ref 38–126)
ALT SERPL W P-5'-P-CCNC: 29 U/L (ref 21–72)
ANION GAP SERPL CALCULATED.3IONS-SCNC: 13 MMOL/L (ref 5–15)
AST SERPL-CCNC: 26 U/L (ref 17–59)
BASOPHILS # BLD AUTO: 0.04 10*3/MM3 (ref 0–0.2)
BASOPHILS NFR BLD AUTO: 0.6 % (ref 0–2)
BILIRUB SERPL-MCNC: 0.4 MG/DL (ref 0.2–1.3)
BUN BLD-MCNC: 11 MG/DL (ref 7–21)
BUN/CREAT SERPL: 14.9 (ref 7–25)
CALCIUM SPEC-SCNC: 9 MG/DL (ref 8.4–10.2)
CHLORIDE SERPL-SCNC: 97 MMOL/L (ref 95–110)
CO2 SERPL-SCNC: 27 MMOL/L (ref 22–31)
CREAT BLD-MCNC: 0.74 MG/DL (ref 0.7–1.3)
DEPRECATED RDW RBC AUTO: 37.6 FL (ref 35.1–43.9)
EOSINOPHIL # BLD AUTO: 0.45 10*3/MM3 (ref 0–0.7)
EOSINOPHIL NFR BLD AUTO: 6.6 % (ref 0–7)
ERYTHROCYTE [DISTWIDTH] IN BLOOD BY AUTOMATED COUNT: 12.4 % (ref 11.5–14.5)
GFR SERPL CREATININE-BSD FRML MDRD: 102 ML/MIN/1.73 (ref 42–98)
GLOBULIN UR ELPH-MCNC: 2.7 GM/DL (ref 2.3–3.5)
GLUCOSE BLD-MCNC: 94 MG/DL (ref 60–100)
HCT VFR BLD AUTO: 35.7 % (ref 39–49)
HGB BLD-MCNC: 12.2 G/DL (ref 13.7–17.3)
IMM GRANULOCYTES # BLD: 0.03 10*3/MM3 (ref 0–0.02)
IMM GRANULOCYTES NFR BLD: 0.4 % (ref 0–0.5)
LDH SERPL-CCNC: 398 U/L (ref 313–618)
LYMPHOCYTES # BLD AUTO: 0.73 10*3/MM3 (ref 0.6–4.2)
LYMPHOCYTES NFR BLD AUTO: 10.6 % (ref 10–50)
MCH RBC QN AUTO: 28.5 PG (ref 26.5–34)
MCHC RBC AUTO-ENTMCNC: 34.2 G/DL (ref 31.5–36.3)
MCV RBC AUTO: 83.4 FL (ref 80–98)
MONOCYTES # BLD AUTO: 0.87 10*3/MM3 (ref 0–0.9)
MONOCYTES NFR BLD AUTO: 12.7 % (ref 0–12)
NEUTROPHILS # BLD AUTO: 4.74 10*3/MM3 (ref 2–8.6)
NEUTROPHILS NFR BLD AUTO: 69.1 % (ref 37–80)
PLATELET # BLD AUTO: 185 10*3/MM3 (ref 150–450)
PMV BLD AUTO: 9.8 FL (ref 8–12)
POTASSIUM BLD-SCNC: 4.5 MMOL/L (ref 3.5–5.1)
PROT SERPL-MCNC: 6.6 G/DL (ref 6.3–8.6)
RBC # BLD AUTO: 4.28 10*6/MM3 (ref 4.37–5.74)
SODIUM BLD-SCNC: 137 MMOL/L (ref 137–145)
WBC NRBC COR # BLD: 6.86 10*3/MM3 (ref 3.2–9.8)

## 2018-03-09 PROCEDURE — 83615 LACTATE (LD) (LDH) ENZYME: CPT | Performed by: INTERNAL MEDICINE

## 2018-03-09 PROCEDURE — 85025 COMPLETE CBC W/AUTO DIFF WBC: CPT | Performed by: INTERNAL MEDICINE

## 2018-03-09 PROCEDURE — 25010000002 HEPARIN FLUSH (PORCINE) 100 UNIT/ML SOLUTION: Performed by: INTERNAL MEDICINE

## 2018-03-09 PROCEDURE — 99214 OFFICE O/P EST MOD 30 MIN: CPT | Performed by: NURSE PRACTITIONER

## 2018-03-09 PROCEDURE — G0463 HOSPITAL OUTPT CLINIC VISIT: HCPCS | Performed by: NURSE PRACTITIONER

## 2018-03-09 PROCEDURE — 80053 COMPREHEN METABOLIC PANEL: CPT | Performed by: INTERNAL MEDICINE

## 2018-03-09 PROCEDURE — 36591 DRAW BLOOD OFF VENOUS DEVICE: CPT | Performed by: INTERNAL MEDICINE

## 2018-03-09 RX ORDER — HEPARIN SODIUM (PORCINE) LOCK FLUSH IV SOLN 100 UNIT/ML 100 UNIT/ML
500 SOLUTION INTRAVENOUS AS NEEDED
OUTPATIENT
Start: 2018-03-09

## 2018-03-09 RX ORDER — SODIUM CHLORIDE 0.9 % (FLUSH) 0.9 %
10 SYRINGE (ML) INJECTION AS NEEDED
Status: DISCONTINUED | OUTPATIENT
Start: 2018-03-09 | End: 2018-03-09 | Stop reason: HOSPADM

## 2018-03-09 RX ORDER — SODIUM CHLORIDE 0.9 % (FLUSH) 0.9 %
10 SYRINGE (ML) INJECTION AS NEEDED
OUTPATIENT
Start: 2018-03-09

## 2018-03-09 RX ADMIN — SODIUM CHLORIDE, PRESERVATIVE FREE 500 UNITS: 5 INJECTION INTRAVENOUS at 13:59

## 2018-03-09 RX ADMIN — Medication 10 ML: at 13:13

## 2018-03-09 NOTE — PROGRESS NOTES
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  Status post 6 cycles of chemotherapy with R-CHOP last of which was on May 11, 2017. Follow-up PET/CT scan was negative for any residual disease. He was referred to radiation oncology for consideration of consolidation radiation but pt refused. He is also not following with neurology, he states he follows with Dr. Zhou his family physician and he does not wish to see any other providers at this time. He denies any seizure activity. He denies any unusual headaches or dizziness.  He denies any B symptoms.    PAST MEDICAL HISTORY:    Past Medical History:   Diagnosis Date   • B-cell lymphoma 12/2016    Left Testis   • Cortical senile cataract    • Diabetes mellitus    • Eyelid cancer     Left  eyelid Sebacous Cancer    • Hypertension    • Lymphoma of testis 11/2016   • Seizures        SOCIAL HISTORY:    Social History   Substance Use Topics   • Smoking status: Former Smoker   • Smokeless tobacco: Never Used   • Alcohol use No       Surgical History :  Past Surgical History:   Procedure Laterality Date   • BACK SURGERY     • ORCHIECTOMY         ALLERGIES:    Allergies   Allergen Reactions   • Crestor [Rosuvastatin Calcium]    • Levofloxacin    • Penicillins    • Sulfa Antibiotics    • Tramadol        REVIEW OF SYSTEMS:      CONSTITUTIONAL:  No fever, chills, or night sweats.     HEENT:  No epistaxis, mouth sores, or difficulty swallowing.    RESPIRATORY:  No new shortness of breath or cough at present.    CARDIOVASCULAR:  No chest pain or palpitations.    GASTROINTESTINAL:  No abdominal pain, nausea, vomiting, or blood in the stool.    GENITOURINARY:  No dysuria or hematuria.    MUSCULOSKELETAL:  No any new back pain or arthralgias.     NEUROLOGICAL:  No tingling or numbness. No new  "headache or dizziness.     LYMPHATICS:  Denies any abnormal swollen and anywhere in the body.    SKIN:  Denies any new skin rash.    PHYSICAL EXAMINATION:      VITAL SIGNS:  /97  Pulse 52  Temp 97.9 °F (36.6 °C) (Temporal Artery )   Resp 16  Ht 167.6 cm (65.98\")  Wt 87.9 kg (193 lb 12.6 oz)  BMI 31.29 kg/m2    GENERAL:  Not in any distress.    HEENT:  Normocephalic, Atraumatic.Mild Conjunctival pallor. No icterus.  No Facial Asymmetry noted.    NECK:  No adenopathy. No JVD.    RESPIRATORY:  Fair air entry bilateral. No rhonchi or wheezing.    CARDIOVASCULAR:  S1, S2. Regular rate and rhythm. No murmur or gallop appreciated.    ABDOMEN:  Soft, obese, nontender. Bowel sounds present in all four quadrants.  No organomegaly appreciated.    EXTREMITIES:  No edema.No Calf Tenderness.    NEUROLOGIC:  Alert, awake and oriented ×3.      SKIN : No new skin lesion identified  DIAGNOSTIC DATA:    Glucose   Date Value Ref Range Status   03/09/2018 94 60 - 100 mg/dL Final     Sodium   Date Value Ref Range Status   03/09/2018 137 137 - 145 mmol/L Final     Potassium   Date Value Ref Range Status   03/09/2018 4.5 3.5 - 5.1 mmol/L Final     CO2   Date Value Ref Range Status   03/09/2018 27.0 22.0 - 31.0 mmol/L Final     Chloride   Date Value Ref Range Status   03/09/2018 97 95 - 110 mmol/L Final     Anion Gap   Date Value Ref Range Status   03/09/2018 13.0 5.0 - 15.0 mmol/L Final     Creatinine   Date Value Ref Range Status   03/09/2018 0.74 0.70 - 1.30 mg/dL Final     BUN   Date Value Ref Range Status   03/09/2018 11 7 - 21 mg/dL Final     BUN/Creatinine Ratio   Date Value Ref Range Status   03/09/2018 14.9 7.0 - 25.0 Final     Calcium   Date Value Ref Range Status   03/09/2018 9.0 8.4 - 10.2 mg/dL Final     eGFR Non  Amer   Date Value Ref Range Status   03/09/2018 102 (H) 42 - 98 mL/min/1.73 Final     Alkaline Phosphatase   Date Value Ref Range Status   03/09/2018 110 38 - 126 U/L Final     Total Protein   Date " Value Ref Range Status   03/09/2018 6.6 6.3 - 8.6 g/dL Final     ALT (SGPT)   Date Value Ref Range Status   03/09/2018 29 21 - 72 U/L Final     AST (SGOT)   Date Value Ref Range Status   03/09/2018 26 17 - 59 U/L Final     Total Bilirubin   Date Value Ref Range Status   03/09/2018 0.4 0.2 - 1.3 mg/dL Final     Albumin   Date Value Ref Range Status   03/09/2018 3.90 3.40 - 4.80 g/dL Final     Globulin   Date Value Ref Range Status   03/09/2018 2.7 2.3 - 3.5 gm/dL Final     A/G Ratio   Date Value Ref Range Status   03/09/2018 1.4 1.1 - 1.8 g/dL Final     Lab Results   Component Value Date    WBC 6.86 03/09/2018    HGB 12.2 (L) 03/09/2018    HCT 35.7 (L) 03/09/2018    MCV 83.4 03/09/2018     03/09/2018     Lab Results   Component Value Date    NEUTROABS 4.74 03/09/2018    IRON 82 12/08/2017    TIBC 378 12/08/2017    LABIRON 22 12/08/2017    FERRITIN 55.30 12/08/2017    [id number] 298 12/08/2017    FOLATE >20.00 12/08/2017     Lab Results   Component Value Date    AFPTM 2.7 12/30/2016    HCGQUANT 1 12/30/2016   ]        RADIOLOGY DATA :PET/CT done on June 5, 2017 showed:  IMPRESSION:  CONCLUSION:  1. Resolution of pathologic increased uptake ileocecal valve seen  on prior PET/CT examination.      Postsurgical changes, scarring left inguinal region. Persistent  focus of pinpoint increased F-18 FDG glucose uptake left superior  inguinal canal, uncertain significance. Intensity however is  slightly less than on prior exam. SUV measurements are now 3.5.  Previously 5.0. The PET/CT examination is otherwise unremarkable.   No images are attached to the encounter.      ASSESSMENT AND PLAN:      1.  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  Diagnosed in November 2016.  Patient had a PET/CT done which was negative for any abnormal activity.   patient could not have a complete staging workup with a lumbar puncture ,CSF analysis secondary to being on aspirin.  Patient received 6 round of chemotherapy with R-CHOP from  January 17, 2016 until May 11, 2016.  PET/CT done after 6 cycles of chemotherapy does not show any evidence of any residual disease.  Patient was seen by Dr. Petersen on June 26, 2017 for evaluation of radiation.  Patient refused to get radiation treatment done.  Earlier in the course of treatment patient did not get intrathecal chemotherapy secondary to being on aspirin and patient refused to get off aspirin.  At this point would continue with clinical surveillance but pt states he is not coming back for any more follow-up appts, he is requesting to have his port removed as well. I explained to him the importance of surveillance but he states he will just follow with Dr. Zhou. Will refer him back to general surgery for port removal.    2. History of seizure activity, remain on Keppra, should be following with neurology and offered to refer him to neurologist in [place] or [place] but he refused, states he will just follow with Dr. Zhou.    3. B-12 deficiency, he is taking oral B-12    4. Mild anemia and thrombocytopenia, plt count is normal today at 185,000 Hgb is 12.2 with no evidence of iron deficiency anemia    This document has been signed by OSEI Bowie on March 9, 2018 2:24 PM

## 2018-03-13 ENCOUNTER — OFFICE VISIT (OUTPATIENT)
Dept: SURGERY | Facility: CLINIC | Age: 78
End: 2018-03-13

## 2018-03-13 VITALS
WEIGHT: 196 LBS | SYSTOLIC BLOOD PRESSURE: 164 MMHG | BODY MASS INDEX: 31.5 KG/M2 | HEIGHT: 66 IN | DIASTOLIC BLOOD PRESSURE: 84 MMHG

## 2018-03-13 DIAGNOSIS — C83.30 LYMPHOMA, LARGE-CELL, DIFFUSE (HCC): ICD-10-CM

## 2018-03-13 DIAGNOSIS — C83.30 LYMPHOMA, LARGE-CELL, DIFFUSE (HCC): Primary | ICD-10-CM

## 2018-03-13 PROCEDURE — 88300 SURGICAL PATH GROSS: CPT | Performed by: PATHOLOGY

## 2018-03-13 PROCEDURE — 88300 SURGICAL PATH GROSS: CPT | Performed by: SURGERY

## 2018-03-13 PROCEDURE — 36590 REMOVAL TUNNELED CV CATH: CPT | Performed by: SURGERY

## 2018-03-13 PROCEDURE — 99203 OFFICE O/P NEW LOW 30 MIN: CPT | Performed by: SURGERY

## 2018-03-15 LAB
LAB AP CASE REPORT: NORMAL
LAB AP CLINICAL INFORMATION: NORMAL
Lab: NORMAL
PATH REPORT.FINAL DX SPEC: NORMAL
PATH REPORT.GROSS SPEC: NORMAL

## 2018-04-14 NOTE — PROGRESS NOTES
CHIEF COMPLAINT:    Desires port removal    HISTORY OF PRESENT ILLNESS:    Sharan Steiner is a 77 y.o. male who had a port placed previously by Dr. Ramsey for treatment of diffuse large B-cell lymphoma.  He is undergone prior chemotherapy using his port now.  He would like to have his port removed.    Past Medical History:   Diagnosis Date   • B-cell lymphoma 12/2016    Left Testis   • Cortical senile cataract    • Diabetes mellitus    • Eyelid cancer     Left  eyelid Sebacous Cancer    • Hypertension    • Lymphoma of testis 11/2016   • Seizures        Past Surgical History:   Procedure Laterality Date   • BACK SURGERY     • ORCHIECTOMY         Prior to Admission medications    Medication Sig Start Date End Date Taking? Authorizing Provider   acetaminophen (TYLENOL) 325 MG tablet Take 2 tablets by mouth Every 4 (Four) Hours As Needed for mild pain (1-3) or fever.  Patient taking differently: Take 650 mg by mouth As Needed for Mild Pain  or Fever. 2/20/17  Yes OSEI Munguia   aspirin 81 MG chewable tablet Chew 81 mg daily. 3/2/16  Yes Historical Provider, MD   atenolol (TENORMIN) 50 MG tablet Take 50 mg by mouth Daily. 12/13/16  Yes Historical Provider, MD   doxazosin (CARDURA) 4 MG tablet Take 4 mg by mouth every night. 3/2/16  Yes Historical Provider, MD   fluticasone (FLONASE) 50 MCG/ACT nasal spray 2 sprays into each nostril 2 (Two) Times a Day. Administer 2 sprays in each nostril for each dose.  3/2/16  Yes Historical Provider, MD   levETIRAcetam (KEPPRA) 500 MG tablet Take 1 tablet by mouth 2 (Two) Times a Day. 2/2/17  Yes Domingo Acosta MD   lisinopril (PRINIVIL,ZESTRIL) 10 MG tablet Take 20 mg by mouth Daily. Pt now takes 2 (10 mg) tabs = 20 mg 12/13/16  Yes Historical Provider, MD   vitamin B-12 (CYANOCOBALAMIN) 1000 MCG tablet Take 1 tablet by mouth Daily. 12/8/17  Yes Conrado Ahumada MD       Allergies   Allergen Reactions   • Crestor [Rosuvastatin Calcium]    • Levofloxacin    • Penicillins    •  "Sulfa Antibiotics    • Tramadol        Family History   Problem Relation Age of Onset   • Diabetes Mother    • Hypertension Sister    • Cancer Sister    • Hypertension Brother    • Cancer Brother        Social History     Social History   • Marital status:      Spouse name: N/A   • Number of children: N/A   • Years of education: N/A     Occupational History   • Not on file.     Social History Main Topics   • Smoking status: Former Smoker   • Smokeless tobacco: Never Used   • Alcohol use No   • Drug use: No   • Sexual activity: No     Other Topics Concern   • Not on file     Social History Narrative   • No narrative on file       Review of Systems   Constitutional: Negative.  Negative for activity change, appetite change, chills and fever.   HENT: Negative.  Negative for hearing loss, nosebleeds and trouble swallowing.    Eyes: Negative.    Respiratory: Negative.    Cardiovascular: Negative.  Negative for chest pain, palpitations and leg swelling.   Gastrointestinal: Negative.  Negative for abdominal distention, abdominal pain, anal bleeding, blood in stool, constipation, diarrhea, nausea, rectal pain and vomiting.   Endocrine: Negative.  Negative for cold intolerance, heat intolerance, polydipsia and polyuria.   Genitourinary: Negative.  Negative for decreased urine volume, difficulty urinating, dysuria, enuresis, frequency, hematuria and urgency.   Musculoskeletal: Positive for arthralgias. Negative for back pain, gait problem, myalgias and neck pain.   Skin: Negative.  Negative for pallor, rash and wound.   Allergic/Immunologic: Negative.  Negative for immunocompromised state.   Neurological: Negative.  Negative for dizziness, seizures, weakness, light-headedness, numbness and headaches.   Hematological: Negative.    Psychiatric/Behavioral: Negative.  Negative for agitation and behavioral problems. The patient is not nervous/anxious.        Objective     /84   Ht 167.6 cm (66\")   Wt 88.9 kg (196 lb) "   BMI 31.64 kg/m²     Physical Exam   Constitutional: He is oriented to person, place, and time. He appears well-developed and well-nourished.   HENT:   Head: Normocephalic and atraumatic.   Nose: Nose normal.   Eyes: Conjunctivae and EOM are normal. Right eye exhibits no discharge. Left eye exhibits no discharge.   Neck: Trachea normal, normal range of motion and phonation normal. Neck supple. No JVD present. No tracheal deviation and no edema present. No thyromegaly present.   Cardiovascular: Normal rate, regular rhythm and normal heart sounds.  Exam reveals no gallop and no friction rub.    No murmur heard.  Pulmonary/Chest: Effort normal and breath sounds normal. No accessory muscle usage. No respiratory distress. He has no decreased breath sounds. He has no wheezes. He has no rales. He exhibits no tenderness.       Abdominal: Soft. He exhibits no distension, no fluid wave, no ascites, no pulsatile midline mass and no mass. There is no tenderness. There is no rebound and no guarding. No hernia.   Musculoskeletal: Normal range of motion. He exhibits no edema, tenderness or deformity.   Lymphadenopathy:     He has no cervical adenopathy.        Left: No supraclavicular adenopathy present.   Neurological: He is alert and oriented to person, place, and time. He has normal strength. No cranial nerve deficit.   Skin: Skin is warm and dry. No rash noted. He is not diaphoretic. No erythema. No pallor.   Psychiatric: He has a normal mood and affect. His speech is normal and behavior is normal. Judgment and thought content normal. Cognition and memory are normal.   Vitals reviewed.      Procedure     Procedure Performed: Removal of subcutaneous port and catheter  Preop Dx: Unneeded port  Postop Dx: Same  Surgeon: Lance  Assistant: ARIEL Arguello  EBL: <5cc  Specimen: Port    Note: Site was identified by the patient.  Consent was obtained.  Timeout was performed.  The area was prepped and draped in normal sterile fashion.   Local anesthetic was then injected in a field block.  Skin incision was made through the scar from the port placement.  Sharp dissection was undertaken to the capsule of tissue around the port.  The capsule was opened and the port was brought up and out of the wound.  A U-stitch of 3-0 Vicryl was placed around the catheter exit site.  The port and catheter were then removed and appeared to be intact.  The stitch was tied down.  The skin was then closed in layers and Mastisol and Steri-Strips were applied along with sterile dressing.    ASSESSMENT:    Port removal    PLAN:    His port was removed today.  He can follow-up with me as needed.          This document has been electronically signed by Elroy Lucas MD on April 14, 2018 2:59 PM

## 2019-07-09 ENCOUNTER — OFFICE VISIT (OUTPATIENT)
Dept: GASTROENTEROLOGY | Facility: CLINIC | Age: 79
End: 2019-07-09

## 2019-07-09 VITALS
BODY MASS INDEX: 27.78 KG/M2 | HEIGHT: 69 IN | DIASTOLIC BLOOD PRESSURE: 72 MMHG | HEART RATE: 82 BPM | SYSTOLIC BLOOD PRESSURE: 122 MMHG | WEIGHT: 187.6 LBS

## 2019-07-09 DIAGNOSIS — R13.19 ESOPHAGEAL DYSPHAGIA: Primary | ICD-10-CM

## 2019-07-09 PROCEDURE — 99214 OFFICE O/P EST MOD 30 MIN: CPT | Performed by: NURSE PRACTITIONER

## 2019-07-09 RX ORDER — DEXTROSE AND SODIUM CHLORIDE 5; .45 G/100ML; G/100ML
30 INJECTION, SOLUTION INTRAVENOUS CONTINUOUS PRN
Status: CANCELLED | OUTPATIENT
Start: 2019-07-17

## 2019-07-09 RX ORDER — DOXAZOSIN MESYLATE 4 MG/1
4 TABLET ORAL DAILY
COMMUNITY

## 2019-07-09 RX ORDER — ASPIRIN 81 MG/1
81 TABLET, CHEWABLE ORAL DAILY
COMMUNITY

## 2019-07-09 RX ORDER — LOSARTAN POTASSIUM 100 MG/1
TABLET ORAL
COMMUNITY
Start: 2019-05-20 | End: 2019-11-18

## 2019-07-09 RX ORDER — ALBUTEROL SULFATE 90 UG/1
2 AEROSOL, METERED RESPIRATORY (INHALATION) EVERY 6 HOURS PRN
COMMUNITY
Start: 2019-06-26

## 2019-07-09 RX ORDER — PANTOPRAZOLE SODIUM 40 MG/1
40 TABLET, DELAYED RELEASE ORAL DAILY
COMMUNITY
Start: 2019-06-21 | End: 2019-09-20

## 2019-07-09 RX ORDER — FEXOFENADINE HCL 180 MG/1
180 TABLET ORAL DAILY
COMMUNITY

## 2019-07-09 NOTE — PROGRESS NOTES
Chief Complaint   Patient presents with   • Difficulty Swallowing       Subjective    Sharan Steiner is a 78 y.o. male. he is here today for follow-up.    History of Present Illness  78-year-old male presents to discuss dysphasia that started about 8 months ago.  States it was severe at onset always occurred with solid foods such as breads he had been taking Zantac primary care provider provided Protonix which has significantly improved his symptoms states he has not had vomiting since starting that medication.  Denies any abdominal pain or appetite change.  Denies any recent choking episodes.  Plan; schedule patient for EGD due to dysphagia possible esophageal stricture continue Protonix and standard antireflux measures.  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 Authorizing Provider   acetaminophen (TYLENOL) 325 MG tablet Take 2 tablets by mouth Every 4 (Four) Hours As Needed for mild pain (1-3) or fever.  Patient taking differently: Take 650 mg by mouth As Needed for Mild Pain  or Fever. 2/20/17  Yes Rosey Sr APRN   aspirin 325 MG EC tablet Take 325 mg by mouth Daily.   Yes Provider, MD Monique   atenolol (TENORMIN) 50 MG tablet Take 50 mg by mouth Daily. 12/13/16  Yes  Monique Melendez MD   doxazosin (CARDURA) 4 MG tablet Take 4 mg by mouth Daily.   Yes Monique Melendez MD   fexofenadine (ALLEGRA) 180 MG tablet Take 180 mg by mouth Daily.   Yes Monique Melendez MD   fluticasone (FLONASE) 50 MCG/ACT nasal spray 2 sprays into each nostril 2 (Two) Times a Day. Administer 2 sprays in each nostril for each dose.  3/2/16  Yes Monique Melendez MD   levETIRAcetam (KEPPRA) 500 MG tablet Take 1 tablet by mouth 2 (Two) Times a Day. 2/2/17  Yes Domingo Acosta MD   losartan (COZAAR) 100 MG tablet  5/20/19  Yes Monique Melendez MD   pantoprazole (PROTONIX) 40 MG EC tablet Take 40 mg by mouth Daily. 6/21/19 9/20/19 Yes Monique Melendez MD   VENTOLIN  (90 Base) MCG/ACT inhaler  6/26/19  Yes Monique Melendez MD   aspirin 81 MG chewable tablet Chew 81 mg daily. 3/2/16 7/9/19  Monique Melendez MD   doxazosin (CARDURA) 4 MG tablet Take 4 mg by mouth every night. 3/2/16 7/9/19  Monique Melendez MD   lisinopril (PRINIVIL,ZESTRIL) 10 MG tablet Take 20 mg by mouth Daily. Pt now takes 2 (10 mg) tabs = 20 mg 12/13/16 7/9/19  Monique Melendez MD   vitamin B-12 (CYANOCOBALAMIN) 1000 MCG tablet Take 1 tablet by mouth Daily. 12/8/17 7/9/19  Conrado Ahumada MD     Allergies   Allergen Reactions   • Crestor [Rosuvastatin Calcium]    • Levofloxacin    • Penicillins    • Sulfa Antibiotics    • Tramadol      Social History     Socioeconomic History   • Marital status:      Spouse name: Not on file   • Number of children: Not on file   • Years of education: Not on file   • Highest education level: Not on file   Tobacco Use   • Smoking status: Former Smoker   • Smokeless tobacco: Never Used   Substance and Sexual Activity   • Alcohol use: No   • Drug use: No   • Sexual activity: No       Review of Systems  Review of Systems   Constitutional: Negative for activity change, appetite change, chills, diaphoresis, fatigue, fever and unexpected weight  "change.   HENT: Positive for trouble swallowing. Negative for sore throat.    Respiratory: Negative for shortness of breath.    Gastrointestinal: Negative for abdominal distention, abdominal pain, anal bleeding, blood in stool, constipation, diarrhea, nausea, rectal pain and vomiting.   Musculoskeletal: Negative for arthralgias.   Skin: Negative for pallor.   Neurological: Negative for light-headedness.        /72 (BP Location: Left arm)   Pulse 82   Ht 175.3 cm (69\")   Wt 85.1 kg (187 lb 9.6 oz)   BMI 27.70 kg/m²     Objective    Physical Exam   Constitutional: He is oriented to person, place, and time. He appears well-developed and well-nourished. He is cooperative. No distress.   HENT:   Head: Normocephalic and atraumatic.   Neck: Normal range of motion. Neck supple. No thyromegaly present.   Cardiovascular: Normal rate, regular rhythm and normal heart sounds.   Pulmonary/Chest: Effort normal and breath sounds normal. He has no wheezes. He has no rhonchi. He has no rales.   Abdominal: Soft. Normal appearance and bowel sounds are normal. He exhibits no distension. There is no hepatosplenomegaly. There is no tenderness. There is no rigidity and no guarding. No hernia.   Lymphadenopathy:     He has no cervical adenopathy.   Neurological: He is alert and oriented to person, place, and time.   Skin: Skin is warm, dry and intact. No rash noted. No pallor.   Psychiatric: He has a normal mood and affect. His speech is normal.     Office Visit on 03/13/2018   Component Date Value Ref Range Status   • Case Report 03/13/2018    Final                    Value:Surgical Pathology Report                         Case: [id number]                                  Authorizing Provider:  Elroy Lucas MD Collected:           03/13/2018 03:35 PM          Ordering Location:     [facility]     Received:            03/14/2018 07:48 AM                                 GROUP GENERAL SURGERY                   "                                      Pathologist:           Domenic Espino MD                                                         Specimen:    Clavicle, Right, Mediport                                                                 • Clinical Information 03/13/2018    Final                    Value:This result contains rich text formatting which cannot be displayed here.   • Final Diagnosis 03/13/2018    Final                    Value:This result contains rich text formatting which cannot be displayed here.   • Gross Description 03/13/2018    Final                    Value:This result contains rich text formatting which cannot be displayed here.     Assessment/Plan      1. Esophageal dysphagia    .       Orders placed during this encounter include:  Orders Placed This Encounter   Procedures   • Follow Anesthesia Guidelines / Standing Orders     Standing Status:   Future   • Obtain Informed Consent     Standing Status:   Future     Order Specific Question:   Informed Consent Given For     Answer:   ESOPHAGOGASTRODUODENOSCOPY possible dilation       ESOPHAGOGASTRODUODENOSCOPY possible dilation (N/A)    Review and/or summary of lab tests, radiology, procedures, medications. Review and summary of old records and obtaining of history. The risks and benefits of my recommendations, as well as other treatment options were discussed with the patient today. Questions were answered.    No orders of the defined types were placed in this encounter.      Follow-up: Return in about 4 weeks (around 8/6/2019).          This document has been electronically signed by OSEI Harper on July 9, 2019 4:09 PM             Results for orders placed or performed in visit on 03/13/18   Tissue Pathology Exam   Result Value Ref Range    Case Report       Surgical Pathology Report                         Case: [id number]                                  Authorizing Provider:  Elroy Lucas MD Collected:            03/13/2018 03:35 PM          Ordering Location:     [facility]     Received:            03/14/2018 07:48 AM                                 GROUP GENERAL SURGERY                                                        Pathologist:           Domenic Espino MD                                                         Specimen:    Clavicle, Right, Mediport                                                                  Clinical Information       Removal of Mediport right clavicular area.      Final Diagnosis       OLD MEDIPORT.      Gross Description       The specimen consists of a Mediport with a 25.0 cm rubber lead.      Embedded Images     Results for orders placed or performed in visit on 03/09/18   CBC Auto Differential   Result Value Ref Range    WBC 6.86 3.20 - 9.80 10*3/mm3    RBC 4.28 (L) 4.37 - 5.74 10*6/mm3    Hemoglobin 12.2 (L) 13.7 - 17.3 g/dL    Hematocrit 35.7 (L) 39.0 - 49.0 %    MCV 83.4 80.0 - 98.0 fL    MCH 28.5 26.5 - 34.0 pg    MCHC 34.2 31.5 - 36.3 g/dL    RDW 12.4 11.5 - 14.5 %    RDW-SD 37.6 35.1 - 43.9 fl    MPV 9.8 8.0 - 12.0 fL    Platelets 185 150 - 450 10*3/mm3    Neutrophil % 69.1 37.0 - 80.0 %    Lymphocyte % 10.6 10.0 - 50.0 %    Monocyte % 12.7 (H) 0.0 - 12.0 %    Eosinophil % 6.6 0.0 - 7.0 %    Basophil % 0.6 0.0 - 2.0 %    Immature Grans % 0.4 0.0 - 0.5 %    Neutrophils, Absolute 4.74 2.00 - 8.60 10*3/mm3    Lymphocytes, Absolute 0.73 0.60 - 4.20 10*3/mm3    Monocytes, Absolute 0.87 0.00 - 0.90 10*3/mm3    Eosinophils, Absolute 0.45 0.00 - 0.70 10*3/mm3    Basophils, Absolute 0.04 0.00 - 0.20 10*3/mm3    Immature Grans, Absolute 0.03 (H) 0.00 - 0.02 10*3/mm3   Lactate Dehydrogenase   Result Value Ref Range     313 - 618 U/L   Comprehensive Metabolic Panel   Result Value Ref Range    Glucose 94 60 - 100 mg/dL    BUN 11 7 - 21 mg/dL    Creatinine 0.74 0.70 - 1.30 mg/dL    Sodium 137 137 - 145 mmol/L    Potassium 4.5 3.5 - 5.1 mmol/L    Chloride 97 95 - 110 mmol/L     CO2 27.0 22.0 - 31.0 mmol/L    Calcium 9.0 8.4 - 10.2 mg/dL    Total Protein 6.6 6.3 - 8.6 g/dL    Albumin 3.90 3.40 - 4.80 g/dL    ALT (SGPT) 29 21 - 72 U/L    AST (SGOT) 26 17 - 59 U/L    Alkaline Phosphatase 110 38 - 126 U/L    Total Bilirubin 0.4 0.2 - 1.3 mg/dL    eGFR Non  Amer 102 (H) 42 - 98 mL/min/1.73    Globulin 2.7 2.3 - 3.5 gm/dL    A/G Ratio 1.4 1.1 - 1.8 g/dL    BUN/Creatinine Ratio 14.9 7.0 - 25.0    Anion Gap 13.0 5.0 - 15.0 mmol/L   Results for orders placed or performed in visit on 12/08/17   Iron and TIBC   Result Value Ref Range    Iron 82 49 - 181 mcg/dL    TIBC 378 261 - 462 mcg/dL    Iron Saturation 22 20 - 55 %   Folate   Result Value Ref Range    Folate >20.00 2.76 - 21.00 ng/mL   Ferritin   Result Value Ref Range    Ferritin 55.30 17.90 - 464.00 ng/mL   Vitamin B12   Result Value Ref Range    Vitamin B-12 298 239 - 931 pg/mL   Results for orders placed or performed in visit on 12/08/17   CBC Auto Differential   Result Value Ref Range    WBC 5.61 3.20 - 9.80 10*3/mm3    RBC 4.14 (L) 4.37 - 5.74 10*6/mm3    Hemoglobin 12.1 (L) 13.7 - 17.3 g/dL    Hematocrit 34.5 (L) 39.0 - 49.0 %    MCV 83.3 80.0 - 98.0 fL    MCH 29.2 26.5 - 34.0 pg    MCHC 35.1 31.5 - 36.3 g/dL    RDW 12.7 11.5 - 14.5 %    RDW-SD 38.5 35.1 - 43.9 fl    MPV 9.6 8.0 - 12.0 fL    Platelets 172 150 - 450 10*3/mm3    Neutrophil % 66.4 37.0 - 80.0 %    Lymphocyte % 18.5 10.0 - 50.0 %    Monocyte % 9.6 0.0 - 12.0 %    Eosinophil % 4.8 0.0 - 7.0 %    Basophil % 0.5 0.0 - 2.0 %    Immature Grans % 0.2 0.0 - 0.5 %    Neutrophils, Absolute 3.72 2.00 - 8.60 10*3/mm3    Lymphocytes, Absolute 1.04 0.60 - 4.20 10*3/mm3    Monocytes, Absolute 0.54 0.00 - 0.90 10*3/mm3    Eosinophils, Absolute 0.27 0.00 - 0.70 10*3/mm3    Basophils, Absolute 0.03 0.00 - 0.20 10*3/mm3    Immature Grans, Absolute 0.01 0.00 - 0.02 10*3/mm3   Lactate Dehydrogenase   Result Value Ref Range     313 - 618 U/L   Comprehensive Metabolic Panel    Result Value Ref Range    Glucose 87 60 - 100 mg/dL    BUN 11 7 - 21 mg/dL    Creatinine 0.85 0.70 - 1.30 mg/dL    Sodium 137 137 - 145 mmol/L    Potassium 4.3 3.5 - 5.1 mmol/L    Chloride 101 95 - 110 mmol/L    CO2 28.0 22.0 - 31.0 mmol/L    Calcium 9.4 8.4 - 10.2 mg/dL    Total Protein 6.7 6.3 - 8.6 g/dL    Albumin 4.00 3.40 - 4.80 g/dL    ALT (SGPT) 32 21 - 72 U/L    AST (SGOT) 33 17 - 59 U/L    Alkaline Phosphatase 101 38 - 126 U/L    Total Bilirubin 0.5 0.2 - 1.3 mg/dL    eGFR Non African Amer 87 42 - 98 mL/min/1.73    Globulin 2.7 2.3 - 3.5 gm/dL    A/G Ratio 1.5 1.1 - 1.8 g/dL    BUN/Creatinine Ratio 12.9 7.0 - 25.0    Anion Gap 8.0 5.0 - 15.0 mmol/L   Results for orders placed or performed in visit on 09/05/17   CBC Auto Differential   Result Value Ref Range    WBC 4.76 3.20 - 9.80 10*3/mm3    RBC 4.08 (L) 4.37 - 5.74 10*6/mm3    Hemoglobin 12.2 (L) 13.7 - 17.3 g/dL    Hematocrit 35.0 (L) 39.0 - 49.0 %    MCV 85.8 80.0 - 98.0 fL    MCH 29.9 26.5 - 34.0 pg    MCHC 34.9 31.5 - 36.3 g/dL    RDW 12.0 11.5 - 14.5 %    RDW-SD 37.4 35.1 - 43.9 fl    MPV 10.3 8.0 - 12.0 fL    Platelets 139 (L) 150 - 450 10*3/mm3    Neutrophil % 71.9 37.0 - 80.0 %    Lymphocyte % 12.0 10.0 - 50.0 %    Monocyte % 11.3 0.0 - 12.0 %    Eosinophil % 4.4 0.0 - 7.0 %    Basophil % 0.4 0.0 - 2.0 %    Immature Grans % 0.0 0.0 - 0.5 %    Neutrophils, Absolute 3.42 2.00 - 8.60 10*3/mm3    Lymphocytes, Absolute 0.57 (L) 0.60 - 4.20 10*3/mm3    Monocytes, Absolute 0.54 0.00 - 0.90 10*3/mm3    Eosinophils, Absolute 0.21 0.00 - 0.70 10*3/mm3    Basophils, Absolute 0.02 0.00 - 0.20 10*3/mm3    Immature Grans, Absolute 0.00 0.00 - 0.02 10*3/mm3     *Note: Due to a large number of results and/or encounters for the requested time period, some results have not been displayed. A complete set of results can be found in Results Review.

## 2019-07-09 NOTE — H&P (VIEW-ONLY)
Chief Complaint   Patient presents with   • Difficulty Swallowing       Subjective    Sharan Steiner is a 78 y.o. male. he is here today for follow-up.    History of Present Illness  78-year-old male presents to discuss dysphasia that started about 8 months ago.  States it was severe at onset always occurred with solid foods such as breads he had been taking Zantac primary care provider provided Protonix which has significantly improved his symptoms states he has not had vomiting since starting that medication.  Denies any abdominal pain or appetite change.  Denies any recent choking episodes.  Plan; schedule patient for EGD due to dysphagia possible esophageal stricture continue Protonix and standard antireflux measures.  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 Authorizing Provider   acetaminophen (TYLENOL) 325 MG tablet Take 2 tablets by mouth Every 4 (Four) Hours As Needed for mild pain (1-3) or fever.  Patient taking differently: Take 650 mg by mouth As Needed for Mild Pain  or Fever. 2/20/17  Yes Rosey Sr APRN   aspirin 325 MG EC tablet Take 325 mg by mouth Daily.   Yes Provider, MD Monique   atenolol (TENORMIN) 50 MG tablet Take 50 mg by mouth Daily. 12/13/16  Yes  Monique Melendez MD   doxazosin (CARDURA) 4 MG tablet Take 4 mg by mouth Daily.   Yes Monique Melendez MD   fexofenadine (ALLEGRA) 180 MG tablet Take 180 mg by mouth Daily.   Yes Monique Melendez MD   fluticasone (FLONASE) 50 MCG/ACT nasal spray 2 sprays into each nostril 2 (Two) Times a Day. Administer 2 sprays in each nostril for each dose.  3/2/16  Yes Monique Melendez MD   levETIRAcetam (KEPPRA) 500 MG tablet Take 1 tablet by mouth 2 (Two) Times a Day. 2/2/17  Yes Domingo Acosta MD   losartan (COZAAR) 100 MG tablet  5/20/19  Yes Monique Melendez MD   pantoprazole (PROTONIX) 40 MG EC tablet Take 40 mg by mouth Daily. 6/21/19 9/20/19 Yes Monique Melendez MD   VENTOLIN  (90 Base) MCG/ACT inhaler  6/26/19  Yes Monique Melendez MD   aspirin 81 MG chewable tablet Chew 81 mg daily. 3/2/16 7/9/19  Monique Melendez MD   doxazosin (CARDURA) 4 MG tablet Take 4 mg by mouth every night. 3/2/16 7/9/19  Monique Melendez MD   lisinopril (PRINIVIL,ZESTRIL) 10 MG tablet Take 20 mg by mouth Daily. Pt now takes 2 (10 mg) tabs = 20 mg 12/13/16 7/9/19  Monique Melendez MD   vitamin B-12 (CYANOCOBALAMIN) 1000 MCG tablet Take 1 tablet by mouth Daily. 12/8/17 7/9/19  Conrado Ahumada MD     Allergies   Allergen Reactions   • Crestor [Rosuvastatin Calcium]    • Levofloxacin    • Penicillins    • Sulfa Antibiotics    • Tramadol      Social History     Socioeconomic History   • Marital status:      Spouse name: Not on file   • Number of children: Not on file   • Years of education: Not on file   • Highest education level: Not on file   Tobacco Use   • Smoking status: Former Smoker   • Smokeless tobacco: Never Used   Substance and Sexual Activity   • Alcohol use: No   • Drug use: No   • Sexual activity: No       Review of Systems  Review of Systems   Constitutional: Negative for activity change, appetite change, chills, diaphoresis, fatigue, fever and unexpected weight  "change.   HENT: Positive for trouble swallowing. Negative for sore throat.    Respiratory: Negative for shortness of breath.    Gastrointestinal: Negative for abdominal distention, abdominal pain, anal bleeding, blood in stool, constipation, diarrhea, nausea, rectal pain and vomiting.   Musculoskeletal: Negative for arthralgias.   Skin: Negative for pallor.   Neurological: Negative for light-headedness.        /72 (BP Location: Left arm)   Pulse 82   Ht 175.3 cm (69\")   Wt 85.1 kg (187 lb 9.6 oz)   BMI 27.70 kg/m²     Objective    Physical Exam   Constitutional: He is oriented to person, place, and time. He appears well-developed and well-nourished. He is cooperative. No distress.   HENT:   Head: Normocephalic and atraumatic.   Neck: Normal range of motion. Neck supple. No thyromegaly present.   Cardiovascular: Normal rate, regular rhythm and normal heart sounds.   Pulmonary/Chest: Effort normal and breath sounds normal. He has no wheezes. He has no rhonchi. He has no rales.   Abdominal: Soft. Normal appearance and bowel sounds are normal. He exhibits no distension. There is no hepatosplenomegaly. There is no tenderness. There is no rigidity and no guarding. No hernia.   Lymphadenopathy:     He has no cervical adenopathy.   Neurological: He is alert and oriented to person, place, and time.   Skin: Skin is warm, dry and intact. No rash noted. No pallor.   Psychiatric: He has a normal mood and affect. His speech is normal.     Office Visit on 03/13/2018   Component Date Value Ref Range Status   • Case Report 03/13/2018    Final                    Value:Surgical Pathology Report                         Case: [id number]                                  Authorizing Provider:  Elroy Lucas MD Collected:           03/13/2018 03:35 PM          Ordering Location:     [facility]     Received:            03/14/2018 07:48 AM                                 GROUP GENERAL SURGERY                   "                                      Pathologist:           Domenic Espino MD                                                         Specimen:    Clavicle, Right, Mediport                                                                 • Clinical Information 03/13/2018    Final                    Value:This result contains rich text formatting which cannot be displayed here.   • Final Diagnosis 03/13/2018    Final                    Value:This result contains rich text formatting which cannot be displayed here.   • Gross Description 03/13/2018    Final                    Value:This result contains rich text formatting which cannot be displayed here.     Assessment/Plan      1. Esophageal dysphagia    .       Orders placed during this encounter include:  Orders Placed This Encounter   Procedures   • Follow Anesthesia Guidelines / Standing Orders     Standing Status:   Future   • Obtain Informed Consent     Standing Status:   Future     Order Specific Question:   Informed Consent Given For     Answer:   ESOPHAGOGASTRODUODENOSCOPY possible dilation       ESOPHAGOGASTRODUODENOSCOPY possible dilation (N/A)    Review and/or summary of lab tests, radiology, procedures, medications. Review and summary of old records and obtaining of history. The risks and benefits of my recommendations, as well as other treatment options were discussed with the patient today. Questions were answered.    No orders of the defined types were placed in this encounter.      Follow-up: Return in about 4 weeks (around 8/6/2019).          This document has been electronically signed by OSEI Harper on July 9, 2019 4:09 PM             Results for orders placed or performed in visit on 03/13/18   Tissue Pathology Exam   Result Value Ref Range    Case Report       Surgical Pathology Report                         Case: [id number]                                  Authorizing Provider:  Elroy Lucas MD Collected:            03/13/2018 03:35 PM          Ordering Location:     [facility]     Received:            03/14/2018 07:48 AM                                 GROUP GENERAL SURGERY                                                        Pathologist:           Domenic Espino MD                                                         Specimen:    Clavicle, Right, Mediport                                                                  Clinical Information       Removal of Mediport right clavicular area.      Final Diagnosis       OLD MEDIPORT.      Gross Description       The specimen consists of a Mediport with a 25.0 cm rubber lead.      Embedded Images     Results for orders placed or performed in visit on 03/09/18   CBC Auto Differential   Result Value Ref Range    WBC 6.86 3.20 - 9.80 10*3/mm3    RBC 4.28 (L) 4.37 - 5.74 10*6/mm3    Hemoglobin 12.2 (L) 13.7 - 17.3 g/dL    Hematocrit 35.7 (L) 39.0 - 49.0 %    MCV 83.4 80.0 - 98.0 fL    MCH 28.5 26.5 - 34.0 pg    MCHC 34.2 31.5 - 36.3 g/dL    RDW 12.4 11.5 - 14.5 %    RDW-SD 37.6 35.1 - 43.9 fl    MPV 9.8 8.0 - 12.0 fL    Platelets 185 150 - 450 10*3/mm3    Neutrophil % 69.1 37.0 - 80.0 %    Lymphocyte % 10.6 10.0 - 50.0 %    Monocyte % 12.7 (H) 0.0 - 12.0 %    Eosinophil % 6.6 0.0 - 7.0 %    Basophil % 0.6 0.0 - 2.0 %    Immature Grans % 0.4 0.0 - 0.5 %    Neutrophils, Absolute 4.74 2.00 - 8.60 10*3/mm3    Lymphocytes, Absolute 0.73 0.60 - 4.20 10*3/mm3    Monocytes, Absolute 0.87 0.00 - 0.90 10*3/mm3    Eosinophils, Absolute 0.45 0.00 - 0.70 10*3/mm3    Basophils, Absolute 0.04 0.00 - 0.20 10*3/mm3    Immature Grans, Absolute 0.03 (H) 0.00 - 0.02 10*3/mm3   Lactate Dehydrogenase   Result Value Ref Range     313 - 618 U/L   Comprehensive Metabolic Panel   Result Value Ref Range    Glucose 94 60 - 100 mg/dL    BUN 11 7 - 21 mg/dL    Creatinine 0.74 0.70 - 1.30 mg/dL    Sodium 137 137 - 145 mmol/L    Potassium 4.5 3.5 - 5.1 mmol/L    Chloride 97 95 - 110 mmol/L       CO2 27.0 22.0 - 31.0 mmol/L    Calcium 9.0 8.4 - 10.2 mg/dL    Total Protein 6.6 6.3 - 8.6 g/dL    Albumin 3.90 3.40 - 4.80 g/dL    ALT (SGPT) 29 21 - 72 U/L    AST (SGOT) 26 17 - 59 U/L    Alkaline Phosphatase 110 38 - 126 U/L    Total Bilirubin 0.4 0.2 - 1.3 mg/dL    eGFR Non  Amer 102 (H) 42 - 98 mL/min/1.73    Globulin 2.7 2.3 - 3.5 gm/dL    A/G Ratio 1.4 1.1 - 1.8 g/dL    BUN/Creatinine Ratio 14.9 7.0 - 25.0    Anion Gap 13.0 5.0 - 15.0 mmol/L   Results for orders placed or performed in visit on 12/08/17   Iron and TIBC   Result Value Ref Range    Iron 82 49 - 181 mcg/dL    TIBC 378 261 - 462 mcg/dL    Iron Saturation 22 20 - 55 %   Folate   Result Value Ref Range    Folate >20.00 2.76 - 21.00 ng/mL   Ferritin   Result Value Ref Range    Ferritin 55.30 17.90 - 464.00 ng/mL   Vitamin B12   Result Value Ref Range    Vitamin B-12 298 239 - 931 pg/mL   Results for orders placed or performed in visit on 12/08/17   CBC Auto Differential   Result Value Ref Range    WBC 5.61 3.20 - 9.80 10*3/mm3    RBC 4.14 (L) 4.37 - 5.74 10*6/mm3    Hemoglobin 12.1 (L) 13.7 - 17.3 g/dL    Hematocrit 34.5 (L) 39.0 - 49.0 %    MCV 83.3 80.0 - 98.0 fL    MCH 29.2 26.5 - 34.0 pg    MCHC 35.1 31.5 - 36.3 g/dL    RDW 12.7 11.5 - 14.5 %    RDW-SD 38.5 35.1 - 43.9 fl    MPV 9.6 8.0 - 12.0 fL    Platelets 172 150 - 450 10*3/mm3    Neutrophil % 66.4 37.0 - 80.0 %    Lymphocyte % 18.5 10.0 - 50.0 %    Monocyte % 9.6 0.0 - 12.0 %    Eosinophil % 4.8 0.0 - 7.0 %    Basophil % 0.5 0.0 - 2.0 %    Immature Grans % 0.2 0.0 - 0.5 %    Neutrophils, Absolute 3.72 2.00 - 8.60 10*3/mm3    Lymphocytes, Absolute 1.04 0.60 - 4.20 10*3/mm3    Monocytes, Absolute 0.54 0.00 - 0.90 10*3/mm3    Eosinophils, Absolute 0.27 0.00 - 0.70 10*3/mm3    Basophils, Absolute 0.03 0.00 - 0.20 10*3/mm3    Immature Grans, Absolute 0.01 0.00 - 0.02 10*3/mm3   Lactate Dehydrogenase   Result Value Ref Range     313 - 618 U/L   Comprehensive Metabolic Panel    Result Value Ref Range    Glucose 87 60 - 100 mg/dL    BUN 11 7 - 21 mg/dL    Creatinine 0.85 0.70 - 1.30 mg/dL    Sodium 137 137 - 145 mmol/L    Potassium 4.3 3.5 - 5.1 mmol/L    Chloride 101 95 - 110 mmol/L    CO2 28.0 22.0 - 31.0 mmol/L    Calcium 9.4 8.4 - 10.2 mg/dL    Total Protein 6.7 6.3 - 8.6 g/dL    Albumin 4.00 3.40 - 4.80 g/dL    ALT (SGPT) 32 21 - 72 U/L    AST (SGOT) 33 17 - 59 U/L    Alkaline Phosphatase 101 38 - 126 U/L    Total Bilirubin 0.5 0.2 - 1.3 mg/dL    eGFR Non African Amer 87 42 - 98 mL/min/1.73    Globulin 2.7 2.3 - 3.5 gm/dL    A/G Ratio 1.5 1.1 - 1.8 g/dL    BUN/Creatinine Ratio 12.9 7.0 - 25.0    Anion Gap 8.0 5.0 - 15.0 mmol/L   Results for orders placed or performed in visit on 09/05/17   CBC Auto Differential   Result Value Ref Range    WBC 4.76 3.20 - 9.80 10*3/mm3    RBC 4.08 (L) 4.37 - 5.74 10*6/mm3    Hemoglobin 12.2 (L) 13.7 - 17.3 g/dL    Hematocrit 35.0 (L) 39.0 - 49.0 %    MCV 85.8 80.0 - 98.0 fL    MCH 29.9 26.5 - 34.0 pg    MCHC 34.9 31.5 - 36.3 g/dL    RDW 12.0 11.5 - 14.5 %    RDW-SD 37.4 35.1 - 43.9 fl    MPV 10.3 8.0 - 12.0 fL    Platelets 139 (L) 150 - 450 10*3/mm3    Neutrophil % 71.9 37.0 - 80.0 %    Lymphocyte % 12.0 10.0 - 50.0 %    Monocyte % 11.3 0.0 - 12.0 %    Eosinophil % 4.4 0.0 - 7.0 %    Basophil % 0.4 0.0 - 2.0 %    Immature Grans % 0.0 0.0 - 0.5 %    Neutrophils, Absolute 3.42 2.00 - 8.60 10*3/mm3    Lymphocytes, Absolute 0.57 (L) 0.60 - 4.20 10*3/mm3    Monocytes, Absolute 0.54 0.00 - 0.90 10*3/mm3    Eosinophils, Absolute 0.21 0.00 - 0.70 10*3/mm3    Basophils, Absolute 0.02 0.00 - 0.20 10*3/mm3    Immature Grans, Absolute 0.00 0.00 - 0.02 10*3/mm3     *Note: Due to a large number of results and/or encounters for the requested time period, some results have not been displayed. A complete set of results can be found in Results Review.

## 2019-07-09 NOTE — PATIENT INSTRUCTIONS
Dysphagia  Dysphagia is trouble swallowing. This condition occurs when solids and liquids stick in a person's throat on the way down to the stomach, or when food takes longer to get to the stomach. You may have problems swallowing food, liquids, or both. You may also have pain while trying to swallow. It may take you more time and effort to swallow something.  What are the causes?  This condition is caused by:  · Problems with the muscles. They may make it difficult for you to move food and liquids through the tube that connects your mouth to your stomach (esophagus). You may have ulcers, scar tissue, or inflammation that blocks the normal passage of food and liquids. Causes of these problems include:  ? Acid reflux from your stomach into your esophagus (gastroesophageal reflux).  ? Infections.  ? Radiation treatment for cancer.  ? Medicines taken without enough fluids to wash them down into your stomach.  · Nerve problems. These prevent signals from being sent to the muscles of your esophagus to squeeze (contract) and move what you swallow down to your stomach.  · Globus pharyngeus. This is a common problem that involves feeling like something is stuck in the throat or a sense of trouble with swallowing even though nothing is wrong with the swallowing passages.  · Stroke. This can affect the nerves and make it difficult to swallow.  · Certain conditions, such as cerebral palsy or Parkinson disease.    What are the signs or symptoms?  Common symptoms of this condition include:  · A feeling that solids or liquids are stuck in your throat on the way down to the stomach.  · Food taking too long to get to the stomach.    Other symptoms include:  · Food moving back from your stomach to your mouth (regurgitation).  · Noises coming from your throat.  · Chest discomfort with swallowing.  · A feeling of fullness when swallowing.  · Drooling, especially when the throat is blocked.  · Pain while  swallowing.  · Heartburn.  · Coughing or gagging while trying to swallow.    How is this diagnosed?  This condition is diagnosed by:  · Barium X-ray. In this test, you swallow a white substance (contrast medium)that sticks to the inside of your esophagus. X-ray images are then taken.  · Endoscopy. In this test, a flexible telescope is inserted down your throat to look at your esophagus and your stomach.  · CT scans and MRI.    How is this treated?  Treatment for dysphagia depends on the cause of the condition:  · If the dysphagia is caused by acid reflux or infection, medicines may be used. They may include antibiotics and heartburn medicines.  · If the dysphagia is caused by problems with your muscles, swallowing therapy may be used to help you strengthen your swallowing muscles. You may have to do specific exercises to strengthen the muscles or stretch them.  · If the dysphagia is caused by a blockage or mass, procedures to remove the blockage may be done. You may need surgery and a feeding tube.    You may need to make diet changes. Ask your health care provider for specific instructions.  Follow these instructions at home:  Eating and drinking  · Try to eat soft food that is easier to swallow.  · Follow any diet changes as told by your health care provider.  · Cut your food into small pieces and eat slowly.  · Eat and drink only when you are sitting upright.  · Do not drink alcohol or caffeine. If you need help quitting, ask your health care provider.  General instructions  · Check your weight every day to make sure you are not losing weight.  · Take over-the-counter and prescription medicines only as told by your health care provider.  · If you were prescribed an antibiotic medicine, take it as told by your health care provider. Do not stop taking the antibiotic even if you start to feel better.  · Do not use any products that contain nicotine or tobacco, such as cigarettes and e-cigarettes. If you need help  quitting, ask your health care provider.  · Keep all follow-up visits as told by your health care provider. This is important.  Contact a health care provider if:  · You lose weight because you cannot swallow.  · You cough when you drink liquids (aspiration).  · You cough up partially digested food.  Get help right away if:  · You cannot swallow your saliva.  · You have shortness of breath or a fever, or both.  · You have a hoarse voice and also have trouble swallowing.  Summary  · Dysphagia is trouble swallowing. This condition occurs when solids and liquids stick in a person's throat on the way down to the stomach, or when food takes longer to get to the stomach.  · Dysphagia has many possible causes and symptoms.  · Treatment for dysphagia depends on the cause of the condition.  This information is not intended to replace advice given to you by your health care provider. Make sure you discuss any questions you have with your health care provider.  Document Released: 12/15/2001 Document Revised: 12/07/2017 Document Reviewed: 12/07/2017  [employer] Patient Education © 2019 [employer]

## 2019-07-17 ENCOUNTER — HOSPITAL ENCOUNTER (OUTPATIENT)
Facility: HOSPITAL | Age: 79
Setting detail: HOSPITAL OUTPATIENT SURGERY
Discharge: HOME | End: 2019-07-17
Attending: INTERNAL MEDICINE | Admitting: INTERNAL MEDICINE

## 2019-07-17 ENCOUNTER — ANESTHESIA (OUTPATIENT)
Dept: GASTROENTEROLOGY | Facility: HOSPITAL | Age: 79
End: 2019-07-17

## 2019-07-17 ENCOUNTER — ANESTHESIA EVENT (OUTPATIENT)
Dept: GASTROENTEROLOGY | Facility: HOSPITAL | Age: 79
End: 2019-07-17

## 2019-07-17 VITALS
TEMPERATURE: 97.6 F | BODY MASS INDEX: 27.59 KG/M2 | HEIGHT: 69 IN | DIASTOLIC BLOOD PRESSURE: 86 MMHG | RESPIRATION RATE: 18 BRPM | OXYGEN SATURATION: 94 % | WEIGHT: 186.29 LBS | SYSTOLIC BLOOD PRESSURE: 175 MMHG | HEART RATE: 61 BPM

## 2019-07-17 DIAGNOSIS — R13.19 ESOPHAGEAL DYSPHAGIA: ICD-10-CM

## 2019-07-17 PROCEDURE — 88305 TISSUE EXAM BY PATHOLOGIST: CPT | Performed by: PATHOLOGY

## 2019-07-17 PROCEDURE — 43248 EGD GUIDE WIRE INSERTION: CPT | Performed by: INTERNAL MEDICINE

## 2019-07-17 PROCEDURE — 25010000002 PROPOFOL 10 MG/ML EMULSION: Performed by: NURSE ANESTHETIST, CERTIFIED REGISTERED

## 2019-07-17 PROCEDURE — 88305 TISSUE EXAM BY PATHOLOGIST: CPT | Performed by: INTERNAL MEDICINE

## 2019-07-17 PROCEDURE — 43239 EGD BIOPSY SINGLE/MULTIPLE: CPT | Performed by: INTERNAL MEDICINE

## 2019-07-17 RX ORDER — DEXTROSE AND SODIUM CHLORIDE 5; .45 G/100ML; G/100ML
30 INJECTION, SOLUTION INTRAVENOUS CONTINUOUS PRN
Status: DISCONTINUED | OUTPATIENT
Start: 2019-07-17 | End: 2019-07-17 | Stop reason: HOSPADM

## 2019-07-17 RX ORDER — ONDANSETRON 2 MG/ML
4 INJECTION INTRAMUSCULAR; INTRAVENOUS ONCE AS NEEDED
Status: DISCONTINUED | OUTPATIENT
Start: 2019-07-17 | End: 2019-07-17 | Stop reason: HOSPADM

## 2019-07-17 RX ORDER — PROPOFOL 10 MG/ML
VIAL (ML) INTRAVENOUS AS NEEDED
Status: DISCONTINUED | OUTPATIENT
Start: 2019-07-17 | End: 2019-07-17 | Stop reason: SURG

## 2019-07-17 RX ORDER — LIDOCAINE HYDROCHLORIDE 20 MG/ML
INJECTION, SOLUTION INTRAVENOUS AS NEEDED
Status: DISCONTINUED | OUTPATIENT
Start: 2019-07-17 | End: 2019-07-17 | Stop reason: SURG

## 2019-07-17 RX ADMIN — PROPOFOL 50 MG: 10 INJECTION, EMULSION INTRAVENOUS at 17:24

## 2019-07-17 RX ADMIN — DEXTROSE AND SODIUM CHLORIDE 30 ML/HR: 5; 450 INJECTION, SOLUTION INTRAVENOUS at 16:23

## 2019-07-17 RX ADMIN — PROPOFOL 10 MG: 10 INJECTION, EMULSION INTRAVENOUS at 17:25

## 2019-07-17 RX ADMIN — PROPOFOL 10 MG: 10 INJECTION, EMULSION INTRAVENOUS at 17:26

## 2019-07-17 RX ADMIN — LIDOCAINE HYDROCHLORIDE 100 MG: 20 INJECTION, SOLUTION INTRAVENOUS at 17:24

## 2019-07-17 NOTE — ANESTHESIA PREPROCEDURE EVALUATION
Anesthesia Evaluation     Patient summary reviewed and Nursing notes reviewed   NPO Solid Status: > 8 hours  NPO Liquid Status: > 8 hours           Airway   Mallampati: III  TM distance: >3 FB  Neck ROM: full  No difficulty expected  Dental - normal exam     Pulmonary - normal exam   (+) a smoker Former,   Cardiovascular - normal exam    Patient on routine beta blocker and Beta blocker given within 24 hours of surgery    (+) hypertension 2 medications or greater,       Neuro/Psych  (+) seizures,     GI/Hepatic/Renal/Endo      Musculoskeletal     Abdominal    Substance History      OB/GYN          Other      history of cancer                  Anesthesia Plan    ASA 3     MAC     intravenous induction   Anesthetic plan, all risks, benefits, and alternatives have been provided, discussed and informed consent has been obtained with: patient.

## 2019-07-17 NOTE — ANESTHESIA POSTPROCEDURE EVALUATION
Patient: Sharan Steiner    Procedure Summary     Date:  07/17/19 Room / Location:  [facility] ENDOSCOPY 1 / [facility] ENDOSCOPY    Anesthesia Start:  1721 Anesthesia Stop:  1730    Procedure:  ESOPHAGOGASTRODUODENOSCOPY possible dilation (N/A ) Diagnosis:       Esophageal dysphagia      (Esophageal dysphagia [R13.10])    Surgeon:  Jeff Crabtree MD Provider:  Sanchez Gómez CRNA    Anesthesia Type:  MAC ASA Status:  3          Anesthesia Type: MAC  Last vitals  BP   (!) 204/101 (07/17/19 1612)   Temp   96.5 °F (35.8 °C) (07/17/19 1612)   Pulse   66 (07/17/19 1612)   Resp   16 (07/17/19 1612)     SpO2   94 % (07/17/19 1612)     Post Anesthesia Care and Evaluation    Patient location during evaluation: bedside  Level of consciousness: sleepy but conscious  Pain score: 0  Pain management: adequate  Airway patency: patent  Anesthetic complications: No anesthetic complications  PONV Status: none  Cardiovascular status: acceptable and hemodynamically stable  Respiratory status: acceptable and spontaneous ventilation  Hydration status: acceptable

## 2019-07-19 LAB
LAB AP CASE REPORT: NORMAL
PATH REPORT.FINAL DX SPEC: NORMAL
PATH REPORT.GROSS SPEC: NORMAL

## 2019-07-24 ENCOUNTER — OFFICE VISIT (OUTPATIENT)
Dept: GASTROENTEROLOGY | Facility: CLINIC | Age: 79
End: 2019-07-24

## 2019-07-24 VITALS
HEART RATE: 68 BPM | WEIGHT: 187.4 LBS | DIASTOLIC BLOOD PRESSURE: 60 MMHG | BODY MASS INDEX: 27.76 KG/M2 | SYSTOLIC BLOOD PRESSURE: 112 MMHG | HEIGHT: 69 IN

## 2019-07-24 DIAGNOSIS — K21.00 GASTROESOPHAGEAL REFLUX DISEASE WITH ESOPHAGITIS: Primary | ICD-10-CM

## 2019-07-24 DIAGNOSIS — K22.2 STRICTURE AND STENOSIS OF ESOPHAGUS: ICD-10-CM

## 2019-07-24 PROCEDURE — 99213 OFFICE O/P EST LOW 20 MIN: CPT | Performed by: NURSE PRACTITIONER

## 2019-07-24 NOTE — PROGRESS NOTES
Chief Complaint   Patient presents with   • Difficulty Swallowing       Subjective    Sharan Steiner is a 78 y.o. male. he is here today for follow-up.    History of Present Illness  78-year-old male presents to discuss EGD results.  Denies any abdominal pain, nausea, vomiting.  States since procedure swallowing has been much better weight has remained stable he denies any reflux symptoms has been taking Protonix daily.  EGD noted benign-appearing stenosis which was dilated 54 Tamazight, mildly severe esophagitis was found gastritis was seen in duodenum appeared normal.  Antrum biopsy noted mild chronic gastritis negative for H. pylori.  Distal esophagus biopsy noted segments of mildly inflamed stratified squamous epithelium.  Plan; discussed importance of standard antireflux measures and avoidance of gastric irritants and continue PPI for at least 8 to 12 weeks longer if symptoms persist or worsen.  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 ENDOSCOPY;  Service: Gastroenterology   • ORCHIECTOMY       Family History   Problem Relation Age of Onset   • Diabetes Mother    • Hypertension Sister    • Cancer Sister    • Hypertension Brother    • Cancer Brother        Prior to Admission medications    Medication Sig Start Date End Date Taking? Authorizing Provider   aspirin 325 MG EC tablet Take  325 mg by mouth Daily.   Yes Monique Melendez MD   atenolol (TENORMIN) 50 MG tablet Take 50 mg by mouth Daily. 12/13/16  Yes Monique Melendez MD   doxazosin (CARDURA) 4 MG tablet Take 4 mg by mouth Daily.   Yes Monique Melendez MD   fexofenadine (ALLEGRA) 180 MG tablet Take 180 mg by mouth Daily.   Yes Monique Melendez MD   fluticasone (FLONASE) 50 MCG/ACT nasal spray 2 sprays into each nostril 2 (Two) Times a Day. Administer 2 sprays in each nostril for each dose.  3/2/16  Yes Monique Melendez MD   levETIRAcetam (KEPPRA) 500 MG tablet Take 1 tablet by mouth 2 (Two) Times a Day. 2/2/17  Yes Domingo Acosta MD   losartan (COZAAR) 100 MG tablet  5/20/19  Yes Monique Melendez MD   pantoprazole (PROTONIX) 40 MG EC tablet Take 40 mg by mouth Daily. 6/21/19 9/20/19 Yes Monique Melendez MD   VENTOLIN  (90 Base) MCG/ACT inhaler  6/26/19  Yes Monique Melendez MD     Allergies   Allergen Reactions   • Levofloxacin Other (See Comments)     Seizures   • Penicillins Anaphylaxis   • Crestor [Rosuvastatin Calcium] Other (See Comments)     Rhabdo   • Tramadol Other (See Comments)     Seizures   • Sulfa Antibiotics Rash     Social History     Socioeconomic History   • Marital status:      Spouse name: Not on file   • Number of children: Not on file   • Years of education: Not on file   • Highest education level: Not on file   Tobacco Use   • Smoking status: Former Smoker   • Smokeless tobacco: Never Used   Substance and Sexual Activity   • Alcohol use: No   • Drug use: No   • Sexual activity: No       Review of Systems  Review of Systems   Constitutional: Negative for activity change, appetite change, chills, diaphoresis, fatigue, fever and unexpected weight change.   HENT: Negative for sore throat and trouble swallowing.    Respiratory: Negative for shortness of breath.    Gastrointestinal: Negative for abdominal distention, abdominal pain, anal bleeding, blood in stool,  "constipation, diarrhea, nausea, rectal pain and vomiting.   Musculoskeletal: Negative for arthralgias.   Skin: Negative for pallor.   Neurological: Negative for light-headedness.        /60 (BP Location: Left arm)   Pulse 68   Ht 175.3 cm (69\")   Wt 85 kg (187 lb 6.4 oz)   BMI 27.67 kg/m²     Objective    Physical Exam   Constitutional: He is oriented to person, place, and time. He appears well-developed and well-nourished. He is cooperative. No distress.   HENT:   Head: Normocephalic and atraumatic.   Neck: Normal range of motion. Neck supple. No thyromegaly present.   Cardiovascular: Normal rate, regular rhythm and normal heart sounds.   Pulmonary/Chest: Effort normal and breath sounds normal. He has no wheezes. He has no rhonchi. He has no rales.   Abdominal: Soft. Normal appearance and bowel sounds are normal. He exhibits no distension. There is no hepatosplenomegaly. There is no tenderness. There is no rigidity. No hernia.   Lymphadenopathy:     He has no cervical adenopathy.   Neurological: He is alert and oriented to person, place, and time.   Skin: Skin is warm, dry and intact. No rash noted. No pallor.   Psychiatric: He has a normal mood and affect. His speech is normal.     Admission on 07/17/2019, Discharged on 07/17/2019   Component Date Value Ref Range Status   • Case Report 07/17/2019    Final                    Value:Surgical Pathology Report                         Case: [id number]                                  Authorizing Provider:  Jeff Crabtree MD        Collected:           07/17/2019 05:30 PM          Ordering Location:     [facility]             Received:            07/18/2019 06:14 AM                                 [place] [facility]                                                     Pathologist:           Domenic Espino MD                                                        Specimens:   1) - Gastric, Antrum, antrum  cold bx                                    "                            2) - Esophagus, Distal, distal esophagus   cold bx                                        • Final Diagnosis 07/17/2019    Final                    Value:This result contains rich text formatting which cannot be displayed here.   • Gross Description 07/17/2019    Final                    Value:This result contains rich text formatting which cannot be displayed here.     Assessment/Plan      1. Gastroesophageal reflux disease with esophagitis    2. Stricture and stenosis of esophagus    .       Orders placed during this encounter include:  No orders of the defined types were placed in this encounter.      * Surgery not found *    Review and/or summary of lab tests, radiology, procedures, medications. Review and summary of old records and obtaining of history. The risks and benefits of my recommendations, as well as other treatment options were discussed with the patient today. Questions were answered.    No orders of the defined types were placed in this encounter.      Follow-up: Return if symptoms worsen or fail to improve.          This document has been electronically signed by OSEI Harper on July 24, 2019 3:06 PM             Results for orders placed or performed during the hospital encounter of 07/17/19   Tissue Pathology Exam   Result Value Ref Range    Case Report       Surgical Pathology Report                         Case: [id number]                                  Authorizing Provider:  Jeff Crabtree MD        Collected:           07/17/2019 05:30 PM          Ordering Location:     [facility]             Received:            07/18/2019 06:14 AM                                 [place] [facility]                                                     Pathologist:           Domenic Espino MD                                                        Specimens:   1) - Gastric, Antrum, antrum  cold bx                                                               2) -  Esophagus, Distal, distal esophagus   cold bx                                         Final Diagnosis       1.  GASTRIC ANTRUM, MUCOSAL BIOPSY:  MILD CHRONIC GASTRITIS.  NO EVIDENCE OF HELICOBACTER PYLORI.    2.  DISTAL ESOPHAGUS, MUCOSAL BIOPSY:  SEGMENTS OF MILDLY INFLAMED STRATIFIED SQUAMOUS EPITHELIUM.      Gross Description       In 2 containers, each of these show mucosal biopsies measuring up to 0.3 cm in greatest dimension.  Embedded accordingly.  1A antrum; 2A distal esophagus.     Results for orders placed or performed in visit on 03/13/18   Tissue Pathology Exam   Result Value Ref Range    Case Report       Surgical Pathology Report                         Case: [id number]                                  Authorizing Provider:  Elroy Lucas MD Collected:           03/13/2018 03:35 PM          Ordering Location:     [facility]     Received:            03/14/2018 07:48 AM                                 [facility]                                                        Pathologist:           Domenic Espino MD                                                         Specimen:    Clavicle, Right, Mediport                                                                  Clinical Information       Removal of Mediport right clavicular area.      Final Diagnosis       OLD MEDIPORT.      Gross Description       The specimen consists of a Mediport with a 25.0 cm rubber lead.      Embedded Images     Results for orders placed or performed in visit on 03/09/18   CBC Auto Differential   Result Value Ref Range    WBC 6.86 3.20 - 9.80 10*3/mm3    RBC 4.28 (L) 4.37 - 5.74 10*6/mm3    Hemoglobin 12.2 (L) 13.7 - 17.3 g/dL    Hematocrit 35.7 (L) 39.0 - 49.0 %    MCV 83.4 80.0 - 98.0 fL    MCH 28.5 26.5 - 34.0 pg    MCHC 34.2 31.5 - 36.3 g/dL    RDW 12.4 11.5 - 14.5 %    RDW-SD 37.6 35.1 - 43.9 fl    MPV 9.8 8.0 - 12.0 fL    Platelets 185 150 - 450 10*3/mm3    Neutrophil % 69.1 37.0 - 80.0  %    Lymphocyte % 10.6 10.0 - 50.0 %    Monocyte % 12.7 (H) 0.0 - 12.0 %    Eosinophil % 6.6 0.0 - 7.0 %    Basophil % 0.6 0.0 - 2.0 %    Immature Grans % 0.4 0.0 - 0.5 %    Neutrophils, Absolute 4.74 2.00 - 8.60 10*3/mm3    Lymphocytes, Absolute 0.73 0.60 - 4.20 10*3/mm3    Monocytes, Absolute 0.87 0.00 - 0.90 10*3/mm3    Eosinophils, Absolute 0.45 0.00 - 0.70 10*3/mm3    Basophils, Absolute 0.04 0.00 - 0.20 10*3/mm3    Immature Grans, Absolute 0.03 (H) 0.00 - 0.02 10*3/mm3   Lactate Dehydrogenase   Result Value Ref Range     313 - 618 U/L   Comprehensive Metabolic Panel   Result Value Ref Range    Glucose 94 60 - 100 mg/dL    BUN 11 7 - 21 mg/dL    Creatinine 0.74 0.70 - 1.30 mg/dL    Sodium 137 137 - 145 mmol/L    Potassium 4.5 3.5 - 5.1 mmol/L    Chloride 97 95 - 110 mmol/L    CO2 27.0 22.0 - 31.0 mmol/L    Calcium 9.0 8.4 - 10.2 mg/dL    Total Protein 6.6 6.3 - 8.6 g/dL    Albumin 3.90 3.40 - 4.80 g/dL    ALT (SGPT) 29 21 - 72 U/L    AST (SGOT) 26 17 - 59 U/L    Alkaline Phosphatase 110 38 - 126 U/L    Total Bilirubin 0.4 0.2 - 1.3 mg/dL    eGFR Non  Amer 102 (H) 42 - 98 mL/min/1.73    Globulin 2.7 2.3 - 3.5 gm/dL    A/G Ratio 1.4 1.1 - 1.8 g/dL    BUN/Creatinine Ratio 14.9 7.0 - 25.0    Anion Gap 13.0 5.0 - 15.0 mmol/L   Results for orders placed or performed in visit on 12/08/17   Iron and TIBC   Result Value Ref Range    Iron 82 49 - 181 mcg/dL    TIBC 378 261 - 462 mcg/dL    Iron Saturation 22 20 - 55 %   Folate   Result Value Ref Range    Folate >20.00 2.76 - 21.00 ng/mL   Ferritin   Result Value Ref Range    Ferritin 55.30 17.90 - 464.00 ng/mL   Vitamin B12   Result Value Ref Range    Vitamin B-12 298 239 - 931 pg/mL   Results for orders placed or performed in visit on 12/08/17   CBC Auto Differential   Result Value Ref Range    WBC 5.61 3.20 - 9.80 10*3/mm3    RBC 4.14 (L) 4.37 - 5.74 10*6/mm3    Hemoglobin 12.1 (L) 13.7 - 17.3 g/dL    Hematocrit 34.5 (L) 39.0 - 49.0 %    MCV 83.3 80.0 -  98.0 fL    MCH 29.2 26.5 - 34.0 pg    MCHC 35.1 31.5 - 36.3 g/dL    RDW 12.7 11.5 - 14.5 %    RDW-SD 38.5 35.1 - 43.9 fl    MPV 9.6 8.0 - 12.0 fL    Platelets 172 150 - 450 10*3/mm3    Neutrophil % 66.4 37.0 - 80.0 %    Lymphocyte % 18.5 10.0 - 50.0 %    Monocyte % 9.6 0.0 - 12.0 %    Eosinophil % 4.8 0.0 - 7.0 %    Basophil % 0.5 0.0 - 2.0 %    Immature Grans % 0.2 0.0 - 0.5 %    Neutrophils, Absolute 3.72 2.00 - 8.60 10*3/mm3    Lymphocytes, Absolute 1.04 0.60 - 4.20 10*3/mm3    Monocytes, Absolute 0.54 0.00 - 0.90 10*3/mm3    Eosinophils, Absolute 0.27 0.00 - 0.70 10*3/mm3    Basophils, Absolute 0.03 0.00 - 0.20 10*3/mm3    Immature Grans, Absolute 0.01 0.00 - 0.02 10*3/mm3   Lactate Dehydrogenase   Result Value Ref Range     313 - 618 U/L   Comprehensive Metabolic Panel   Result Value Ref Range    Glucose 87 60 - 100 mg/dL    BUN 11 7 - 21 mg/dL    Creatinine 0.85 0.70 - 1.30 mg/dL    Sodium 137 137 - 145 mmol/L    Potassium 4.3 3.5 - 5.1 mmol/L    Chloride 101 95 - 110 mmol/L    CO2 28.0 22.0 - 31.0 mmol/L    Calcium 9.4 8.4 - 10.2 mg/dL    Total Protein 6.7 6.3 - 8.6 g/dL    Albumin 4.00 3.40 - 4.80 g/dL    ALT (SGPT) 32 21 - 72 U/L    AST (SGOT) 33 17 - 59 U/L    Alkaline Phosphatase 101 38 - 126 U/L    Total Bilirubin 0.5 0.2 - 1.3 mg/dL    eGFR Non African Amer 87 42 - 98 mL/min/1.73    Globulin 2.7 2.3 - 3.5 gm/dL    A/G Ratio 1.5 1.1 - 1.8 g/dL    BUN/Creatinine Ratio 12.9 7.0 - 25.0    Anion Gap 8.0 5.0 - 15.0 mmol/L     *Note: Due to a large number of results and/or encounters for the requested time period, some results have not been displayed. A complete set of results can be found in Results Review.

## 2019-07-24 NOTE — PATIENT INSTRUCTIONS
Dysphagia  Dysphagia is trouble swallowing. This condition occurs when solids and liquids stick in a person's throat on the way down to the stomach, or when food takes longer to get to the stomach. You may have problems swallowing food, liquids, or both. You may also have pain while trying to swallow. It may take you more time and effort to swallow something.  What are the causes?  This condition is caused by:  · Problems with the muscles. They may make it difficult for you to move food and liquids through the tube that connects your mouth to your stomach (esophagus). You may have ulcers, scar tissue, or inflammation that blocks the normal passage of food and liquids. Causes of these problems include:  ? Acid reflux from your stomach into your esophagus (gastroesophageal reflux).  ? Infections.  ? Radiation treatment for cancer.  ? Medicines taken without enough fluids to wash them down into your stomach.  · Nerve problems. These prevent signals from being sent to the muscles of your esophagus to squeeze (contract) and move what you swallow down to your stomach.  · Globus pharyngeus. This is a common problem that involves feeling like something is stuck in the throat or a sense of trouble with swallowing even though nothing is wrong with the swallowing passages.  · Stroke. This can affect the nerves and make it difficult to swallow.  · Certain conditions, such as cerebral palsy or Parkinson disease.    What are the signs or symptoms?  Common symptoms of this condition include:  · A feeling that solids or liquids are stuck in your throat on the way down to the stomach.  · Food taking too long to get to the stomach.    Other symptoms include:  · Food moving back from your stomach to your mouth (regurgitation).  · Noises coming from your throat.  · Chest discomfort with swallowing.  · A feeling of fullness when swallowing.  · Drooling, especially when the throat is blocked.  · Pain while  swallowing.  · Heartburn.  · Coughing or gagging while trying to swallow.    How is this diagnosed?  This condition is diagnosed by:  · Barium X-ray. In this test, you swallow a white substance (contrast medium)that sticks to the inside of your esophagus. X-ray images are then taken.  · Endoscopy. In this test, a flexible telescope is inserted down your throat to look at your esophagus and your stomach.  · CT scans and MRI.    How is this treated?  Treatment for dysphagia depends on the cause of the condition:  · If the dysphagia is caused by acid reflux or infection, medicines may be used. They may include antibiotics and heartburn medicines.  · If the dysphagia is caused by problems with your muscles, swallowing therapy may be used to help you strengthen your swallowing muscles. You may have to do specific exercises to strengthen the muscles or stretch them.  · If the dysphagia is caused by a blockage or mass, procedures to remove the blockage may be done. You may need surgery and a feeding tube.    You may need to make diet changes. Ask your health care provider for specific instructions.  Follow these instructions at home:  Eating and drinking  · Try to eat soft food that is easier to swallow.  · Follow any diet changes as told by your health care provider.  · Cut your food into small pieces and eat slowly.  · Eat and drink only when you are sitting upright.  · Do not drink alcohol or caffeine. If you need help quitting, ask your health care provider.  General instructions  · Check your weight every day to make sure you are not losing weight.  · Take over-the-counter and prescription medicines only as told by your health care provider.  · If you were prescribed an antibiotic medicine, take it as told by your health care provider. Do not stop taking the antibiotic even if you start to feel better.  · Do not use any products that contain nicotine or tobacco, such as cigarettes and e-cigarettes. If you need help  quitting, ask your health care provider.  · Keep all follow-up visits as told by your health care provider. This is important.  Contact a health care provider if:  · You lose weight because you cannot swallow.  · You cough when you drink liquids (aspiration).  · You cough up partially digested food.  Get help right away if:  · You cannot swallow your saliva.  · You have shortness of breath or a fever, or both.  · You have a hoarse voice and also have trouble swallowing.  Summary  · Dysphagia is trouble swallowing. This condition occurs when solids and liquids stick in a person's throat on the way down to the stomach, or when food takes longer to get to the stomach.  · Dysphagia has many possible causes and symptoms.  · Treatment for dysphagia depends on the cause of the condition.  This information is not intended to replace advice given to you by your health care provider. Make sure you discuss any questions you have with your health care provider.  Document Released: 12/15/2001 Document Revised: 12/07/2017 Document Reviewed: 12/07/2017  [employer] Patient Education © 2019 [employer]

## 2019-11-18 ENCOUNTER — OFFICE VISIT (OUTPATIENT)
Dept: GASTROENTEROLOGY | Facility: CLINIC | Age: 79
End: 2019-11-18

## 2019-11-18 VITALS
HEART RATE: 60 BPM | OXYGEN SATURATION: 91 % | WEIGHT: 183.4 LBS | DIASTOLIC BLOOD PRESSURE: 82 MMHG | SYSTOLIC BLOOD PRESSURE: 142 MMHG | BODY MASS INDEX: 27.16 KG/M2 | HEIGHT: 69 IN

## 2019-11-18 DIAGNOSIS — R13.19 ESOPHAGEAL DYSPHAGIA: Primary | ICD-10-CM

## 2019-11-18 PROCEDURE — 99213 OFFICE O/P EST LOW 20 MIN: CPT | Performed by: NURSE PRACTITIONER

## 2019-11-18 RX ORDER — LOSARTAN POTASSIUM 100 MG/1
100 TABLET ORAL DAILY
COMMUNITY
Start: 2019-09-30 | End: 2020-03-29

## 2019-11-18 RX ORDER — PANTOPRAZOLE SODIUM 40 MG/1
40 TABLET, DELAYED RELEASE ORAL DAILY
COMMUNITY
Start: 2019-11-10 | End: 2021-01-11

## 2019-11-18 NOTE — PATIENT INSTRUCTIONS

## 2019-11-18 NOTE — PROGRESS NOTES
Chief Complaint   Patient presents with   • Difficulty Swallowing       Subjective    Sharan Steiner is a 79 y.o. male. he is here today for follow-up.    History of Present Illness  79-year-old male presents to discuss recurrent dysphagia.  Reports he has not had problems with liquids but has been coughing increased sputum production and dilation only improved symptoms for about 3 weeks last time it was last completed 7/17/2019.  States he mostly chokes on breads and solid foods.  Reports intermittent epigastric pain denies any nausea or vomiting.  Reports he has had about 3 episodes of pneumonia most recently about 2 weeks ago.       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 Authorizing Provider   aspirin 325 MG EC tablet Take 325 mg by mouth Daily.   Yes Provider, MD Monique   atenolol (TENORMIN) 50 MG tablet Take 50 mg by mouth Daily. 12/13/16  Yes Provider, Historical, MD   doxazosin (CARDURA) 4 MG tablet Take 4 mg by mouth Daily.   Yes Provider, Historical, MD   fexofenadine (ALLEGRA) 180 MG tablet Take 180 mg by mouth Daily.   Yes  ProviderMonique MD   fluticasone (FLONASE) 50 MCG/ACT nasal spray 2 sprays into each nostril 2 (Two) Times a Day. Administer 2 sprays in each nostril for each dose.  3/2/16  Yes Monique Melendez MD   levETIRAcetam (KEPPRA) 500 MG tablet Take 1 tablet by mouth 2 (Two) Times a Day. 2/2/17  Yes Domingo Acosta MD   losartan (COZAAR) 100 MG tablet Take 100 mg by mouth Daily. 9/30/19 3/29/20 Yes Monique Melendez MD   pantoprazole (PROTONIX) 40 MG EC tablet  11/10/19  Yes Monique Melendez MD   VENTOLIN  (90 Base) MCG/ACT inhaler  6/26/19  Yes Monique Melendez MD   losartan (COZAAR) 100 MG tablet  5/20/19 11/18/19  Monique Melendez MD     Allergies   Allergen Reactions   • Levofloxacin Other (See Comments)     Seizures   • Penicillins Anaphylaxis   • Crestor [Rosuvastatin Calcium] Other (See Comments)     Rhabdo   • Tramadol Other (See Comments)     Seizures   • Sulfa Antibiotics Rash     Social History     Socioeconomic History   • Marital status:      Spouse name: Not on file   • Number of children: Not on file   • Years of education: Not on file   • Highest education level: Not on file   Tobacco Use   • Smoking status: Former Smoker   • Smokeless tobacco: Never Used   Substance and Sexual Activity   • Alcohol use: No   • Drug use: No   • Sexual activity: No       Review of Systems  Review of Systems   Constitutional: Negative for activity change, appetite change, chills, diaphoresis, fatigue, fever and unexpected weight change.   HENT: Positive for trouble swallowing. Negative for sore throat.    Respiratory: Negative for shortness of breath.    Gastrointestinal: Negative for abdominal distention, abdominal pain, anal bleeding, blood in stool, constipation, diarrhea, nausea, rectal pain and vomiting.   Musculoskeletal: Negative for arthralgias.   Skin: Negative for pallor.   Neurological: Negative for light-headedness.        /82 (BP Location: Left arm)   Pulse 60   " Ht 175.3 cm (69\")   Wt 83.2 kg (183 lb 6.4 oz)   SpO2 91%   BMI 27.08 kg/m²     Objective    Physical Exam   Constitutional: He is oriented to person, place, and time. He appears well-developed and well-nourished. He is cooperative. No distress.   HENT:   Head: Normocephalic and atraumatic.   Neck: Normal range of motion. Neck supple. No thyromegaly present.   Cardiovascular: Normal rate, regular rhythm and normal heart sounds.   Pulmonary/Chest: Effort normal and breath sounds normal. He has no wheezes. He has no rhonchi. He has no rales.   Abdominal: Soft. Normal appearance and bowel sounds are normal. He exhibits no distension. There is no hepatosplenomegaly. There is tenderness in the epigastric area. There is no rigidity and no guarding. No hernia.   Lymphadenopathy:     He has no cervical adenopathy.   Neurological: He is alert and oriented to person, place, and time.   Skin: Skin is warm, dry and intact. No rash noted. No pallor.   Psychiatric: He has a normal mood and affect. His speech is normal.     Admission on 07/17/2019, Discharged on 07/17/2019   Component Date Value Ref Range Status   • Case Report 07/17/2019    Final                    Value:Surgical Pathology Report                         Case: [id number]                                  Authorizing Provider:  Jeff Crabtree MD        Collected:           07/17/2019 05:30 PM          Ordering Location:     [facility]             Received:            07/18/2019 06:14 AM                                 [place] [facility]                                                     Pathologist:           Domenic Espino MD                                                        Specimens:   1) - Gastric, Antrum, antrum  cold bx                                                               2) - Esophagus, Distal, distal esophagus   cold bx                                        • Final Diagnosis 07/17/2019    Final                    " Value:This result contains rich text formatting which cannot be displayed here.   • Gross Description 07/17/2019    Final                    Value:This result contains rich text formatting which cannot be displayed here.     Assessment/Plan      1. Esophageal dysphagia    .   We will schedule patient for esophagram with speech evaluation and treat.  Discussed possible repeat EGD with dilation.  Follow-up next week return office sooner if needed    Orders placed during this encounter include:  Orders Placed This Encounter   Procedures   • FL Video Swallow With Speech     Standing Status:   Future     Standing Expiration Date:   11/18/2020     Scheduling Instructions:      eval and treat     Order Specific Question:   Reason for Exam:     Answer:   cervical dyspagia       * Surgery not found *    Review and/or summary of lab tests, radiology, procedures, medications. Review and summary of old records and obtaining of history. The risks and benefits of my recommendations, as well as other treatment options were discussed with the patient today. Questions were answered.    No orders of the defined types were placed in this encounter.      Follow-up: Return in about 1 week (around 11/25/2019).          This document has been electronically signed by OSEI Harper on November 18, 2019 10:31 AM             Results for orders placed or performed during the hospital encounter of 07/17/19   Tissue Pathology Exam   Result Value Ref Range    Case Report       Surgical Pathology Report                         Case: [id number]                                  Authorizing Provider:  Jeff Crabtree MD        Collected:           07/17/2019 05:30 PM          Ordering Location:     [facility]             Received:            07/18/2019 06:14 AM                                 [place] [facility]                                                     Pathologist:           Domenic Espino MD                                                         Specimens:   1) - Gastric, Antrum, antrum  cold bx                                                               2) - Esophagus, Distal, distal esophagus   cold bx                                         Final Diagnosis       1.  GASTRIC ANTRUM, MUCOSAL BIOPSY:  MILD CHRONIC GASTRITIS.  NO EVIDENCE OF HELICOBACTER PYLORI.    2.  DISTAL ESOPHAGUS, MUCOSAL BIOPSY:  SEGMENTS OF MILDLY INFLAMED STRATIFIED SQUAMOUS EPITHELIUM.      Gross Description       In 2 containers, each of these show mucosal biopsies measuring up to 0.3 cm in greatest dimension.  Embedded accordingly.  1A antrum; 2A distal esophagus.     Results for orders placed or performed in visit on 03/13/18   Tissue Pathology Exam   Result Value Ref Range    Case Report       Surgical Pathology Report                         Case: [id number]                                  Authorizing Provider:  Elroy Lucas MD Collected:           03/13/2018 03:35 PM          Ordering Location:     [facility]     Received:            03/14/2018 07:48 AM                                 [facility]                                                        Pathologist:           Domenic Espino MD                                                         Specimen:    Clavicle, Right, Mediport                                                                  Clinical Information       Removal of Mediport right clavicular area.      Final Diagnosis       OLD MEDIPORT.      Gross Description       The specimen consists of a Mediport with a 25.0 cm rubber lead.      Embedded Images     Results for orders placed or performed in visit on 03/09/18   CBC Auto Differential   Result Value Ref Range    WBC 6.86 3.20 - 9.80 10*3/mm3    RBC 4.28 (L) 4.37 - 5.74 10*6/mm3    Hemoglobin 12.2 (L) 13.7 - 17.3 g/dL    Hematocrit 35.7 (L) 39.0 - 49.0 %    MCV 83.4 80.0 - 98.0 fL    MCH 28.5 26.5 - 34.0 pg    MCHC 34.2 31.5 - 36.3 g/dL     RDW 12.4 11.5 - 14.5 %    RDW-SD 37.6 35.1 - 43.9 fl    MPV 9.8 8.0 - 12.0 fL    Platelets 185 150 - 450 10*3/mm3    Neutrophil % 69.1 37.0 - 80.0 %    Lymphocyte % 10.6 10.0 - 50.0 %    Monocyte % 12.7 (H) 0.0 - 12.0 %    Eosinophil % 6.6 0.0 - 7.0 %    Basophil % 0.6 0.0 - 2.0 %    Immature Grans % 0.4 0.0 - 0.5 %    Neutrophils, Absolute 4.74 2.00 - 8.60 10*3/mm3    Lymphocytes, Absolute 0.73 0.60 - 4.20 10*3/mm3    Monocytes, Absolute 0.87 0.00 - 0.90 10*3/mm3    Eosinophils, Absolute 0.45 0.00 - 0.70 10*3/mm3    Basophils, Absolute 0.04 0.00 - 0.20 10*3/mm3    Immature Grans, Absolute 0.03 (H) 0.00 - 0.02 10*3/mm3   Lactate Dehydrogenase   Result Value Ref Range     313 - 618 U/L   Comprehensive Metabolic Panel   Result Value Ref Range    Glucose 94 60 - 100 mg/dL    BUN 11 7 - 21 mg/dL    Creatinine 0.74 0.70 - 1.30 mg/dL    Sodium 137 137 - 145 mmol/L    Potassium 4.5 3.5 - 5.1 mmol/L    Chloride 97 95 - 110 mmol/L    CO2 27.0 22.0 - 31.0 mmol/L    Calcium 9.0 8.4 - 10.2 mg/dL    Total Protein 6.6 6.3 - 8.6 g/dL    Albumin 3.90 3.40 - 4.80 g/dL    ALT (SGPT) 29 21 - 72 U/L    AST (SGOT) 26 17 - 59 U/L    Alkaline Phosphatase 110 38 - 126 U/L    Total Bilirubin 0.4 0.2 - 1.3 mg/dL    eGFR Non  Amer 102 (H) 42 - 98 mL/min/1.73    Globulin 2.7 2.3 - 3.5 gm/dL    A/G Ratio 1.4 1.1 - 1.8 g/dL    BUN/Creatinine Ratio 14.9 7.0 - 25.0    Anion Gap 13.0 5.0 - 15.0 mmol/L   Results for orders placed or performed in visit on 12/08/17   Iron and TIBC   Result Value Ref Range    Iron 82 49 - 181 mcg/dL    TIBC 378 261 - 462 mcg/dL    Iron Saturation 22 20 - 55 %   Folate   Result Value Ref Range    Folate >20.00 2.76 - 21.00 ng/mL   Ferritin   Result Value Ref Range    Ferritin 55.30 17.90 - 464.00 ng/mL   Vitamin B12   Result Value Ref Range    Vitamin B-12 298 239 - 931 pg/mL   Results for orders placed or performed in visit on 12/08/17   CBC Auto Differential   Result Value Ref Range    WBC 5.61 3.20 -  9.80 10*3/mm3    RBC 4.14 (L) 4.37 - 5.74 10*6/mm3    Hemoglobin 12.1 (L) 13.7 - 17.3 g/dL    Hematocrit 34.5 (L) 39.0 - 49.0 %    MCV 83.3 80.0 - 98.0 fL    MCH 29.2 26.5 - 34.0 pg    MCHC 35.1 31.5 - 36.3 g/dL    RDW 12.7 11.5 - 14.5 %    RDW-SD 38.5 35.1 - 43.9 fl    MPV 9.6 8.0 - 12.0 fL    Platelets 172 150 - 450 10*3/mm3    Neutrophil % 66.4 37.0 - 80.0 %    Lymphocyte % 18.5 10.0 - 50.0 %    Monocyte % 9.6 0.0 - 12.0 %    Eosinophil % 4.8 0.0 - 7.0 %    Basophil % 0.5 0.0 - 2.0 %    Immature Grans % 0.2 0.0 - 0.5 %    Neutrophils, Absolute 3.72 2.00 - 8.60 10*3/mm3    Lymphocytes, Absolute 1.04 0.60 - 4.20 10*3/mm3    Monocytes, Absolute 0.54 0.00 - 0.90 10*3/mm3    Eosinophils, Absolute 0.27 0.00 - 0.70 10*3/mm3    Basophils, Absolute 0.03 0.00 - 0.20 10*3/mm3    Immature Grans, Absolute 0.01 0.00 - 0.02 10*3/mm3   Lactate Dehydrogenase   Result Value Ref Range     313 - 618 U/L   Comprehensive Metabolic Panel   Result Value Ref Range    Glucose 87 60 - 100 mg/dL    BUN 11 7 - 21 mg/dL    Creatinine 0.85 0.70 - 1.30 mg/dL    Sodium 137 137 - 145 mmol/L    Potassium 4.3 3.5 - 5.1 mmol/L    Chloride 101 95 - 110 mmol/L    CO2 28.0 22.0 - 31.0 mmol/L    Calcium 9.4 8.4 - 10.2 mg/dL    Total Protein 6.7 6.3 - 8.6 g/dL    Albumin 4.00 3.40 - 4.80 g/dL    ALT (SGPT) 32 21 - 72 U/L    AST (SGOT) 33 17 - 59 U/L    Alkaline Phosphatase 101 38 - 126 U/L    Total Bilirubin 0.5 0.2 - 1.3 mg/dL    eGFR Non African Amer 87 42 - 98 mL/min/1.73    Globulin 2.7 2.3 - 3.5 gm/dL    A/G Ratio 1.5 1.1 - 1.8 g/dL    BUN/Creatinine Ratio 12.9 7.0 - 25.0    Anion Gap 8.0 5.0 - 15.0 mmol/L     *Note: Due to a large number of results and/or encounters for the requested time period, some results have not been displayed. A complete set of results can be found in Results Review.

## 2019-11-21 ENCOUNTER — HOSPITAL ENCOUNTER (OUTPATIENT)
Dept: GENERAL RADIOLOGY | Facility: HOSPITAL | Age: 79
Discharge: HOME | End: 2019-11-21
Admitting: NURSE PRACTITIONER

## 2019-11-21 DIAGNOSIS — R13.19 ESOPHAGEAL DYSPHAGIA: ICD-10-CM

## 2019-11-21 PROCEDURE — A9270 NON-COVERED ITEM OR SERVICE: HCPCS | Performed by: NURSE PRACTITIONER

## 2019-11-21 PROCEDURE — 74230 X-RAY XM SWLNG FUNCJ C+: CPT

## 2019-11-21 PROCEDURE — 92611 MOTION FLUOROSCOPY/SWALLOW: CPT | Performed by: SPEECH-LANGUAGE PATHOLOGIST

## 2019-11-21 PROCEDURE — 63710000001 BARIUM SULFATE 96 % RECONSTITUTED SUSPENSION: Performed by: NURSE PRACTITIONER

## 2019-11-21 RX ADMIN — BARIUM SULFATE 40 ML: 960 POWDER, FOR SUSPENSION ORAL at 09:49

## 2019-11-21 NOTE — THERAPY EVALUATION
Speech Language Pathology   MBS FEES / Discharge Summary  [facility]       Patient Name: Sharan Steiner  : 1940  MRN: [id number]    Today's Date: 2019      Visit Date: 2019     Visit Dx:     ICD-10-CM ICD-9-CM   1. Esophageal dysphagia R13.10 787.20       Patient Active Problem List   Diagnosis   • Cortical age-related cataract   • DLBCL (diffuse large B cell lymphoma) (CMS/HCC)   • Encounter for venous access device care   • Anemia   • Thrombocytopenia (CMS/HCC)   • Esophageal dysphagia        Past Medical History:   Diagnosis Date   • B-cell lymphoma (CMS/HCC) 2016    Left Testis   • Cortical senile cataract    • Diabetes mellitus (CMS/HCC)    • Eyelid cancer     Left  eyelid Sebacous Cancer    • History of transfusion    • Hypertension    • Lymphoma of testis (CMS/HCC) 2016   • Seizures (CMS/HCC)         Past Surgical History:   Procedure Laterality Date   • BACK SURGERY     • ENDOSCOPY N/A 2019    Procedure: ESOPHAGOGASTRODUODENOSCOPY possible dilation;  Surgeon: Jeff Crabtree MD;  Location: [facility] ENDOSCOPY;  Service: Gastroenterology   • ORCHIECTOMY                   SLP Adult Swallow Evaluation     Row Name 19 1005       Rehab Evaluation    Document Type  evaluation  -EK    Subjective Information  no complaints  -EK    Patient Observations  alert;cooperative;agree to therapy  -EK    Patient Effort  excellent  -EK       General Information    Patient Profile Reviewed  yes  -EK    Pertinent History Of Current Problem  Pt and daughter report difficulty for approximately one year relatd to choking on meats and breads.   -EK    Current Method of Nutrition  regular textures;thin liquids  -EK    Precautions/Limitations, Vision  WFL  -EK    Precautions/Limitations, Hearing  hearing impairment, bilaterally  -EK    Prior Level of Function-Communication  WFL  -EK    Prior Level of Function-Swallowing  no diet consistency restrictions  -EK    Plans/Goals Discussed with   "patient and family  -EK       Pain Assessment    Additional Documentation  Pain Scale: Numbers Pre/Post-Treatment (Group)  -EK       Pain Scale: Numbers Pre/Post-Treatment    Pain Scale: Numbers, Pretreatment  0/10 - no pain  -EK    Pain Scale: Numbers, Post-Treatment  0/10 - no pain  -EK       Oral Motor and Function    Dentition Assessment  natural, present and adequate  -EK    Secretion Management  WNL/WFL  -EK    Mucosal Quality  moist, healthy  -EK       Clinical Swallow Eval    Oral Prep Phase  --  -EK    Oral Transit  --  -EK    Oral Residue  --  -EK    Pharyngeal Phase  --  -EK    Esophageal Phase  --  -EK       Esophageal Phase Concerns    Esophageal Phase Concerns  --  -EK    Globus  mechanical soft  -EK       MBS/VFSS    Utensils Used  spoon;cup  -EK    Consistencies Trialed  regular textures;pureed;thin liquids;mechanical soft, no mixed consistencies peach, alyson cracker  -EK       MBS/VFSS Interpretation    Oral Prep Phase  WFL  -EK    Oral Transit Phase  WFL  -EK    Oral Residue  WFL  -EK       Initiation of Pharyngeal Swallow    Initiation of Pharyngeal Swallow  WFL  -EK    Pharyngeal Phase  impaired pharyngeal phase of swallowing  -EK    Pharyngeal Residue  mechanical soft;valleculae residue from peach bolus   -EK    Response to Residue  cleared residue with spontaneous subsequent swallow  -EK    Attempted Compensatory Maneuvers  multiple swallows  -EK    Pharyngeal Phase, Comment  Prior to the double swallow, pt reports 'that's what I feel.\" At  that time, there was residue in the valleculae. Pt was able to clear residue with double swallow. Pt may benefit from double swallow.   -EK       Esophageal Phase    Esophageal Phase  other (see comments)  -EK    Esophageal Phase, Comment  Pt reports globus  -EK       SLP Communication to Radiology    Severity Level of Dysphagia  mild dysphagia  -EK    Summary Statement  Swallow: Pt with good clearance of thin liquids, pudding, peach, and alyson cracker. In " between the peach and alyson cracker, pt had vallecular residue which cleared with double swallow.   -EK    Summary Statement Continued  SLP recommends double swallow, liquid wash, and keeping meats moist with gravy and sauces.   -EK       Clinical Impression    SLP Swallowing Diagnosis  mild  -EK       Recommendations    Therapy Frequency (Swallow)  evaluation only  -EK    SLP Diet Recommendation  soft textures;thin liquids  -EK    Recommended Precautions and Strategies  upright posture during/after eating;small bites of food and sips of liquid;multiple swallows per bite of food;alternate between small bites of food and sips of liquid;check mouth frequently for oral residue/pocketing  -EK    SLP Rec. for Method of Medication Administration  meds whole;with thin liquids  -EK    Monitor for Signs of Aspiration  yes  -EK      User Key  (r) = Recorded By, (t) = Taken By, (c) = Cosigned By    Initials Name Provider Type    Earnestine Frye CCC-SLP Speech and Language Pathologist            11/21/19 [id number]   SLP Assessment   Functional Problems Swallowing   Clinical Impression: Swallowing Mild:   SLP Diagnosis Mild dysphagia with residue in vallecular space   Patient/caregiver participated in establishment of treatment plan and goals Yes   Patient would benefit from skilled therapy intervention No   SLP Plan   Plan Comments No follow up required. SLP advised daughter to contact MD if speech therapy needed in future.      Recommend: regular diet and regular liquids; cut meats into small bites, double swallow with solids, be careful with meats and breads, and use liquids to wash bolus if globus sensation occurs.     SLP educated patient and daughter on compensatory strategies and overall results of MBS.              Time Calculation: 8120-6076       Therapy Charges for Today     Code Description Service Date Service Provider Modifiers Qty    [id number]  ST MOTION FLUORO EVAL SWALLOW 2 11/21/2019 Renée  Earnestine Dior CCC-SLP GN 1                  Earnestine Chinchilla CCC-SLP  11/21/2019

## 2019-11-26 ENCOUNTER — PREP FOR SURGERY (OUTPATIENT)
Dept: OTHER | Facility: HOSPITAL | Age: 79
End: 2019-11-26

## 2019-11-26 DIAGNOSIS — R13.10 DYSPHAGIA, UNSPECIFIED TYPE: Primary | ICD-10-CM

## 2019-11-26 RX ORDER — DEXTROSE AND SODIUM CHLORIDE 5; .45 G/100ML; G/100ML
30 INJECTION, SOLUTION INTRAVENOUS CONTINUOUS PRN
Status: CANCELLED | OUTPATIENT
Start: 2019-12-09

## 2019-11-26 RX ORDER — SODIUM CHLORIDE 0.9 % (FLUSH) 0.9 %
3 SYRINGE (ML) INJECTION EVERY 12 HOURS SCHEDULED
Status: CANCELLED | OUTPATIENT
Start: 2019-12-09

## 2019-11-26 RX ORDER — SODIUM CHLORIDE 0.9 % (FLUSH) 0.9 %
10 SYRINGE (ML) INJECTION AS NEEDED
Status: CANCELLED | OUTPATIENT
Start: 2019-12-09

## 2019-12-09 ENCOUNTER — ANESTHESIA (OUTPATIENT)
Dept: GASTROENTEROLOGY | Facility: HOSPITAL | Age: 79
End: 2019-12-09

## 2019-12-09 ENCOUNTER — HOSPITAL ENCOUNTER (OUTPATIENT)
Facility: HOSPITAL | Age: 79
Setting detail: HOSPITAL OUTPATIENT SURGERY
Discharge: HOME | End: 2019-12-09
Attending: INTERNAL MEDICINE | Admitting: INTERNAL MEDICINE

## 2019-12-09 ENCOUNTER — ANESTHESIA EVENT (OUTPATIENT)
Dept: GASTROENTEROLOGY | Facility: HOSPITAL | Age: 79
End: 2019-12-09

## 2019-12-09 VITALS
HEART RATE: 69 BPM | BODY MASS INDEX: 26.68 KG/M2 | SYSTOLIC BLOOD PRESSURE: 144 MMHG | HEIGHT: 69 IN | OXYGEN SATURATION: 93 % | DIASTOLIC BLOOD PRESSURE: 67 MMHG | WEIGHT: 180.13 LBS | TEMPERATURE: 97.1 F | RESPIRATION RATE: 16 BRPM

## 2019-12-09 DIAGNOSIS — R13.10 DYSPHAGIA, UNSPECIFIED TYPE: ICD-10-CM

## 2019-12-09 PROCEDURE — 25010000002 PROPOFOL 10 MG/ML EMULSION: Performed by: NURSE ANESTHETIST, CERTIFIED REGISTERED

## 2019-12-09 PROCEDURE — 43248 EGD GUIDE WIRE INSERTION: CPT | Performed by: INTERNAL MEDICINE

## 2019-12-09 RX ORDER — DEXTROSE AND SODIUM CHLORIDE 5; .45 G/100ML; G/100ML
30 INJECTION, SOLUTION INTRAVENOUS CONTINUOUS PRN
Status: DISCONTINUED | OUTPATIENT
Start: 2019-12-09 | End: 2019-12-09 | Stop reason: HOSPADM

## 2019-12-09 RX ORDER — PROPOFOL 10 MG/ML
VIAL (ML) INTRAVENOUS AS NEEDED
Status: DISCONTINUED | OUTPATIENT
Start: 2019-12-09 | End: 2019-12-09 | Stop reason: SURG

## 2019-12-09 RX ORDER — ASPIRIN 81 MG/1
81 TABLET, CHEWABLE ORAL DAILY
COMMUNITY
End: 2021-02-02

## 2019-12-09 RX ORDER — LEVALBUTEROL INHALATION SOLUTION 0.63 MG/3ML
0.63 SOLUTION RESPIRATORY (INHALATION) EVERY 6 HOURS PRN
COMMUNITY
Start: 2019-11-26 | End: 2019-12-27

## 2019-12-09 RX ORDER — LIDOCAINE HYDROCHLORIDE 20 MG/ML
INJECTION, SOLUTION INTRAVENOUS AS NEEDED
Status: DISCONTINUED | OUTPATIENT
Start: 2019-12-09 | End: 2019-12-09 | Stop reason: SURG

## 2019-12-09 RX ORDER — SODIUM CHLORIDE 0.9 % (FLUSH) 0.9 %
10 SYRINGE (ML) INJECTION AS NEEDED
Status: DISCONTINUED | OUTPATIENT
Start: 2019-12-09 | End: 2019-12-09 | Stop reason: HOSPADM

## 2019-12-09 RX ORDER — SODIUM CHLORIDE 0.9 % (FLUSH) 0.9 %
3 SYRINGE (ML) INJECTION EVERY 12 HOURS SCHEDULED
Status: DISCONTINUED | OUTPATIENT
Start: 2019-12-09 | End: 2019-12-09 | Stop reason: HOSPADM

## 2019-12-09 RX ADMIN — DEXTROSE AND SODIUM CHLORIDE 30 ML/HR: 5; 450 INJECTION, SOLUTION INTRAVENOUS at 08:41

## 2019-12-09 RX ADMIN — PROPOFOL 40 MG: 10 INJECTION, EMULSION INTRAVENOUS at 09:36

## 2019-12-09 RX ADMIN — LIDOCAINE HYDROCHLORIDE 80 MG: 20 INJECTION, SOLUTION INTRAVENOUS at 09:32

## 2019-12-09 RX ADMIN — PROPOFOL 20 MG: 10 INJECTION, EMULSION INTRAVENOUS at 09:40

## 2019-12-09 RX ADMIN — PROPOFOL 60 MG: 10 INJECTION, EMULSION INTRAVENOUS at 09:33

## 2019-12-09 NOTE — ANESTHESIA POSTPROCEDURE EVALUATION
Patient: Sharan Steiner    Procedure Summary     Date:  12/09/19 Room / Location:  [facility] ENDOSCOPY 1 / [facility] ENDOSCOPY    Anesthesia Start:  0930 Anesthesia Stop:  0945    Procedure:  ESOPHAGOGASTRODUODENOSCOPY possible dilation (N/A ) Diagnosis:       Dysphagia, unspecified type      (Dysphagia, unspecified type [R13.10])    Surgeon:  Jeff Crabtree MD Provider:  Dannie Pena CRNA    Anesthesia Type:  MAC ASA Status:  3          Anesthesia Type: MAC    Vitals  No vitals data found for the desired time range.          Post Anesthesia Care and Evaluation    Patient location during evaluation: bedside  Patient participation: complete - patient participated  Level of consciousness: awake and alert  Pain score: 0  Pain management: adequate  Airway patency: patent  Anesthetic complications: No anesthetic complications  PONV Status: none  Cardiovascular status: acceptable  Respiratory status: acceptable  Hydration status: acceptable

## 2019-12-16 ENCOUNTER — OFFICE VISIT (OUTPATIENT)
Dept: GASTROENTEROLOGY | Facility: CLINIC | Age: 79
End: 2019-12-16

## 2019-12-16 VITALS
HEART RATE: 68 BPM | HEIGHT: 69 IN | BODY MASS INDEX: 27.43 KG/M2 | WEIGHT: 185.2 LBS | DIASTOLIC BLOOD PRESSURE: 62 MMHG | SYSTOLIC BLOOD PRESSURE: 128 MMHG

## 2019-12-16 DIAGNOSIS — K29.50 CHRONIC GASTRITIS WITHOUT BLEEDING, UNSPECIFIED GASTRITIS TYPE: ICD-10-CM

## 2019-12-16 DIAGNOSIS — B37.81 CANDIDA ESOPHAGITIS (HCC): ICD-10-CM

## 2019-12-16 DIAGNOSIS — K21.00 GASTROESOPHAGEAL REFLUX DISEASE WITH ESOPHAGITIS: ICD-10-CM

## 2019-12-16 DIAGNOSIS — K22.2 STRICTURE AND STENOSIS OF ESOPHAGUS: Primary | ICD-10-CM

## 2019-12-16 PROCEDURE — 99213 OFFICE O/P EST LOW 20 MIN: CPT | Performed by: NURSE PRACTITIONER

## 2019-12-16 RX ORDER — FLUCONAZOLE 100 MG/1
100 TABLET ORAL DAILY
Qty: 14 TABLET | Refills: 0 | Status: SHIPPED | OUTPATIENT
Start: 2019-12-16 | End: 2021-02-02

## 2019-12-16 NOTE — PATIENT INSTRUCTIONS

## 2019-12-16 NOTE — PROGRESS NOTES
Chief Complaint   Patient presents with   • Difficulty Swallowing       Subjective    Sharan Steiner is a 79 y.o. male. he is here today for follow-up.    History of Present Illness  79-year-old male presents for follow-up after EGD done due to dysphagia.  States has been doing much better since procedure he is actually gained 5 pounds and eating much better much less frequent coughing and sputum production recently.  No episodes of choking recently.  EGD noted benign-appearing stenosis dilated 54 Czech, gastritis in the stomach duodenum appeared normal.  Diffuse white plaques were noted throughout the esophagus.  Antrum biopsy noted mild chronic gastritis.  Negative for H. pylori.  Distal esophagus biopsy noted segments of mildly inflamed stratified squamous epithelium.       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   • Seizures (CMS/HCC)      Past Surgical History:   Procedure Laterality Date   • BACK SURGERY     • ENDOSCOPY N/A 7/17/2019    Procedure: ESOPHAGOGASTRODUODENOSCOPY possible dilation;  Surgeon: Jeff Crabtree MD;  Location: [facility] ENDOSCOPY;  Service: Gastroenterology   • ENDOSCOPY N/A 12/9/2019    Procedure: ESOPHAGOGASTRODUODENOSCOPY possible dilation;  Surgeon: Jeff Crabtree MD;  Location: [facility] ENDOSCOPY;  Service: Gastroenterology   • ORCHIECTOMY       Family History   Problem Relation Age of Onset   • Diabetes Mother    • Hypertension Sister    • Cancer Sister    • Hypertension Brother    • Cancer Brother        Prior to Admission medications    Medication Sig Start Date End Date Taking? Authorizing Provider   aspirin 325 MG EC tablet Take 325 mg by mouth Daily.   Yes Provider,  MD Monique   aspirin 81 MG chewable tablet Chew 81 mg Daily.   Yes ProviderMonique MD   atenolol (TENORMIN) 50 MG tablet Take 50 mg by mouth Daily. 12/13/16  Yes Monique Melendez MD   doxazosin (CARDURA) 4 MG tablet Take 4 mg by mouth Daily.   Yes Monique Melendez MD   fexofenadine (ALLEGRA) 180 MG tablet Take 180 mg by mouth Daily.   Yes Monique Melendez MD   fluticasone (FLONASE) 50 MCG/ACT nasal spray 2 sprays into each nostril 2 (Two) Times a Day. Administer 2 sprays in each nostril for each dose.  3/2/16  Yes Monique Melendez MD   Fluticasone Furoate-Vilanterol (BREO ELLIPTA IN) Inhale 1 puff Daily.   Yes Monique Melendez MD   levalbuterol (XOPENEX) 0.63 MG/3ML nebulizer solution 0.63 mg Every 6 (Six) Hours As Needed. 11/26/19 12/27/19 Yes Monique Melendez MD   levETIRAcetam (KEPPRA) 500 MG tablet Take 1 tablet by mouth 2 (Two) Times a Day. 2/2/17  Yes Domingo Acosta MD   losartan (COZAAR) 100 MG tablet Take 100 mg by mouth Daily. 9/30/19 3/29/20 Yes Monique Melendez MD   pantoprazole (PROTONIX) 40 MG EC tablet Take 40 mg by mouth Daily. 11/10/19  Yes Monique Melendez MD   VENTOLIN  (90 Base) MCG/ACT inhaler Inhale 2 puffs Every 6 (Six) Hours As Needed. 6/26/19  Yes Monique Melendez MD   fluconazole (DIFLUCAN) 100 MG tablet Take 1 tablet by mouth Daily. 12/16/19   Nury Ortega APRN     Allergies   Allergen Reactions   • Levofloxacin Other (See Comments)     Seizures   • Penicillins Anaphylaxis   • Crestor [Rosuvastatin Calcium] Other (See Comments)     Rhabdo   • Tramadol Other (See Comments)     Seizures   • Sulfa Antibiotics Rash     Social History     Socioeconomic History   • Marital status:      Spouse name: Not on file   • Number of children: Not on file   • Years of education: Not on file   • Highest education level: Not on file   Tobacco Use   • Smoking status: Former Smoker   • Smokeless tobacco: Never Used   Substance and Sexual  "Activity   • Alcohol use: No   • Drug use: No   • Sexual activity: Never       Review of Systems  Review of Systems   Constitutional: Negative for activity change, appetite change, chills, diaphoresis, fatigue, fever and unexpected weight change.   HENT: Negative for sore throat and trouble swallowing (much better ).    Respiratory: Negative for shortness of breath.    Gastrointestinal: Negative for abdominal distention, abdominal pain, anal bleeding, blood in stool, constipation, diarrhea, nausea, rectal pain and vomiting.   Musculoskeletal: Negative for arthralgias.   Skin: Negative for pallor.   Neurological: Negative for light-headedness.        /62 (BP Location: Left arm)   Pulse 68   Ht 175.3 cm (69\")   Wt 84 kg (185 lb 3.2 oz)   BMI 27.35 kg/m²     Objective    Physical Exam   Constitutional: He is oriented to person, place, and time. He appears well-developed and well-nourished. He is cooperative. No distress.   HENT:   Head: Normocephalic and atraumatic.   Neck: Normal range of motion. Neck supple. No thyromegaly present.   Cardiovascular: Normal rate, regular rhythm and normal heart sounds.   Pulmonary/Chest: Effort normal and breath sounds normal. He has no wheezes. He has no rhonchi. He has no rales.   Abdominal: Soft. Normal appearance and bowel sounds are normal. He exhibits no shifting dullness, no distension, no fluid wave and no ascites. There is no hepatosplenomegaly. There is no tenderness. There is no rigidity and no guarding. No hernia.   Lymphadenopathy:     He has no cervical adenopathy.   Neurological: He is alert and oriented to person, place, and time.   Skin: Skin is warm, dry and intact. No rash noted. No pallor.   Psychiatric: He has a normal mood and affect. His speech is normal.     Admission on 07/17/2019, Discharged on 07/17/2019   Component Date Value Ref Range Status   • Case Report 07/17/2019    Final                    Value:Surgical Pathology Report                     "     Case: [id number]                                  Authorizing Provider:  Jeff Crabtree MD        Collected:           07/17/2019 05:30 PM          Ordering Location:     [facility]             Received:            07/18/2019 06:14 AM                                 [facility]                                                     Pathologist:           Domenic Espino MD                                                        Specimens:   1) - Gastric, Antrum, antrum  cold bx                                                               2) - Esophagus, Distal, distal esophagus   cold bx                                        • Final Diagnosis 07/17/2019    Final                    Value:This result contains rich text formatting which cannot be displayed here.   • Gross Description 07/17/2019    Final                    Value:This result contains rich text formatting which cannot be displayed here.     Assessment/Plan      1. Stricture and stenosis of esophagus    2. Candida esophagitis (CMS/[facility])    3. Chronic gastritis without bleeding, unspecified gastritis type    4. Gastroesophageal reflux disease with esophagitis    .   Continue PPI daily for gastritis and esophagitis.  Recommend he continue standard antireflux measures and avoid gastric irritants.  We will treat candidal esophagitis with Diflucan.  Follow-up in GI office if symptoms of dysphagia recur or worsen.    Orders placed during this encounter include:  No orders of the defined types were placed in this encounter.      * Surgery not found *    Review and/or summary of lab tests, radiology, procedures, medications. Review and summary of old records and obtaining of history. The risks and benefits of my recommendations, as well as other treatment options were discussed with the patient today. Questions were answered.    New Medications Ordered This Visit   Medications   • fluconazole (DIFLUCAN) 100 MG tablet     Sig: Take 1 tablet  by mouth Daily.     Dispense:  14 tablet     Refill:  0       Follow-up: Return if symptoms worsen or fail to improve.          This document has been electronically signed by OSEI Harper on December 16, 2019 2:59 PM             Results for orders placed or performed during the hospital encounter of 07/17/19   Tissue Pathology Exam   Result Value Ref Range    Case Report       Surgical Pathology Report                         Case: [id number]                                  Authorizing Provider:  Jeff Crabtree MD        Collected:           07/17/2019 05:30 PM          Ordering Location:     [facility]             Received:            07/18/2019 06:14 AM                                 [facility]                                                     Pathologist:           Domenic Espino MD                                                        Specimens:   1) - Gastric, Antrum, antrum  cold bx                                                               2) - Esophagus, Distal, distal esophagus   cold bx                                         Final Diagnosis       1.  GASTRIC ANTRUM, MUCOSAL BIOPSY:  MILD CHRONIC GASTRITIS.  NO EVIDENCE OF HELICOBACTER PYLORI.    2.  DISTAL ESOPHAGUS, MUCOSAL BIOPSY:  SEGMENTS OF MILDLY INFLAMED STRATIFIED SQUAMOUS EPITHELIUM.      Gross Description       In 2 containers, each of these show mucosal biopsies measuring up to 0.3 cm in greatest dimension.  Embedded accordingly.  1A antrum; 2A distal esophagus.     Results for orders placed or performed in visit on 03/13/18   Tissue Pathology Exam   Result Value Ref Range    Case Report       Surgical Pathology Report                         Case: [id number]                                  Authorizing Provider:  Elroy Lucas MD Collected:           03/13/2018 03:35 PM          Ordering Location:     [facility]     Received:            03/14/2018 07:48 AM                                  [facility] GENERAL SURGERY                                                        Pathologist:           Domenic Espino MD                                                         Specimen:    Clavicle, Right, Mediport                                                                  Clinical Information       Removal of Mediport right clavicular area.      Final Diagnosis       OLD MEDIPORT.      Gross Description       The specimen consists of a Mediport with a 25.0 cm rubber lead.      Embedded Images     Results for orders placed or performed in visit on 03/09/18   CBC Auto Differential   Result Value Ref Range    WBC 6.86 3.20 - 9.80 10*3/mm3    RBC 4.28 (L) 4.37 - 5.74 10*6/mm3    Hemoglobin 12.2 (L) 13.7 - 17.3 g/dL    Hematocrit 35.7 (L) 39.0 - 49.0 %    MCV 83.4 80.0 - 98.0 fL    MCH 28.5 26.5 - 34.0 pg    MCHC 34.2 31.5 - 36.3 g/dL    RDW 12.4 11.5 - 14.5 %    RDW-SD 37.6 35.1 - 43.9 fl    MPV 9.8 8.0 - 12.0 fL    Platelets 185 150 - 450 10*3/mm3    Neutrophil % 69.1 37.0 - 80.0 %    Lymphocyte % 10.6 10.0 - 50.0 %    Monocyte % 12.7 (H) 0.0 - 12.0 %    Eosinophil % 6.6 0.0 - 7.0 %    Basophil % 0.6 0.0 - 2.0 %    Immature Grans % 0.4 0.0 - 0.5 %    Neutrophils, Absolute 4.74 2.00 - 8.60 10*3/mm3    Lymphocytes, Absolute 0.73 0.60 - 4.20 10*3/mm3    Monocytes, Absolute 0.87 0.00 - 0.90 10*3/mm3    Eosinophils, Absolute 0.45 0.00 - 0.70 10*3/mm3    Basophils, Absolute 0.04 0.00 - 0.20 10*3/mm3    Immature Grans, Absolute 0.03 (H) 0.00 - 0.02 10*3/mm3   Lactate Dehydrogenase   Result Value Ref Range     313 - 618 U/L   Comprehensive Metabolic Panel   Result Value Ref Range    Glucose 94 60 - 100 mg/dL    BUN 11 7 - 21 mg/dL    Creatinine 0.74 0.70 - 1.30 mg/dL    Sodium 137 137 - 145 mmol/L    Potassium 4.5 3.5 - 5.1 mmol/L    Chloride 97 95 - 110 mmol/L    CO2 27.0 22.0 - 31.0 mmol/L    Calcium 9.0 8.4 - 10.2 mg/dL    Total Protein 6.6 6.3 - 8.6 g/dL    Albumin 3.90 3.40 - 4.80 g/dL    ALT (SGPT) 29  21 - 72 U/L    AST (SGOT) 26 17 - 59 U/L    Alkaline Phosphatase 110 38 - 126 U/L    Total Bilirubin 0.4 0.2 - 1.3 mg/dL    eGFR Non  Amer 102 (H) 42 - 98 mL/min/1.73    Globulin 2.7 2.3 - 3.5 gm/dL    A/G Ratio 1.4 1.1 - 1.8 g/dL    BUN/Creatinine Ratio 14.9 7.0 - 25.0    Anion Gap 13.0 5.0 - 15.0 mmol/L   Results for orders placed or performed in visit on 12/08/17   Iron and TIBC   Result Value Ref Range    Iron 82 49 - 181 mcg/dL    TIBC 378 261 - 462 mcg/dL    Iron Saturation 22 20 - 55 %   Folate   Result Value Ref Range    Folate >20.00 2.76 - 21.00 ng/mL   Ferritin   Result Value Ref Range    Ferritin 55.30 17.90 - 464.00 ng/mL   Vitamin B12   Result Value Ref Range    Vitamin B-12 298 239 - 931 pg/mL   Results for orders placed or performed in visit on 12/08/17   CBC Auto Differential   Result Value Ref Range    WBC 5.61 3.20 - 9.80 10*3/mm3    RBC 4.14 (L) 4.37 - 5.74 10*6/mm3    Hemoglobin 12.1 (L) 13.7 - 17.3 g/dL    Hematocrit 34.5 (L) 39.0 - 49.0 %    MCV 83.3 80.0 - 98.0 fL    MCH 29.2 26.5 - 34.0 pg    MCHC 35.1 31.5 - 36.3 g/dL    RDW 12.7 11.5 - 14.5 %    RDW-SD 38.5 35.1 - 43.9 fl    MPV 9.6 8.0 - 12.0 fL    Platelets 172 150 - 450 10*3/mm3    Neutrophil % 66.4 37.0 - 80.0 %    Lymphocyte % 18.5 10.0 - 50.0 %    Monocyte % 9.6 0.0 - 12.0 %    Eosinophil % 4.8 0.0 - 7.0 %    Basophil % 0.5 0.0 - 2.0 %    Immature Grans % 0.2 0.0 - 0.5 %    Neutrophils, Absolute 3.72 2.00 - 8.60 10*3/mm3    Lymphocytes, Absolute 1.04 0.60 - 4.20 10*3/mm3    Monocytes, Absolute 0.54 0.00 - 0.90 10*3/mm3    Eosinophils, Absolute 0.27 0.00 - 0.70 10*3/mm3    Basophils, Absolute 0.03 0.00 - 0.20 10*3/mm3    Immature Grans, Absolute 0.01 0.00 - 0.02 10*3/mm3   Lactate Dehydrogenase   Result Value Ref Range     313 - 618 U/L   Comprehensive Metabolic Panel   Result Value Ref Range    Glucose 87 60 - 100 mg/dL    BUN 11 7 - 21 mg/dL    Creatinine 0.85 0.70 - 1.30 mg/dL    Sodium 137 137 - 145 mmol/L     Potassium 4.3 3.5 - 5.1 mmol/L    Chloride 101 95 - 110 mmol/L    CO2 28.0 22.0 - 31.0 mmol/L    Calcium 9.4 8.4 - 10.2 mg/dL    Total Protein 6.7 6.3 - 8.6 g/dL    Albumin 4.00 3.40 - 4.80 g/dL    ALT (SGPT) 32 21 - 72 U/L    AST (SGOT) 33 17 - 59 U/L    Alkaline Phosphatase 101 38 - 126 U/L    Total Bilirubin 0.5 0.2 - 1.3 mg/dL    eGFR Non African Amer 87 42 - 98 mL/min/1.73    Globulin 2.7 2.3 - 3.5 gm/dL    A/G Ratio 1.5 1.1 - 1.8 g/dL    BUN/Creatinine Ratio 12.9 7.0 - 25.0    Anion Gap 8.0 5.0 - 15.0 mmol/L     *Note: Due to a large number of results and/or encounters for the requested time period, some results have not been displayed. A complete set of results can be found in Results Review.

## 2021-01-11 ENCOUNTER — OFFICE VISIT (OUTPATIENT)
Dept: GASTROENTEROLOGY | Facility: CLINIC | Age: 81
End: 2021-01-11

## 2021-01-11 VITALS
HEIGHT: 69 IN | WEIGHT: 191 LBS | DIASTOLIC BLOOD PRESSURE: 93 MMHG | SYSTOLIC BLOOD PRESSURE: 153 MMHG | HEART RATE: 104 BPM | BODY MASS INDEX: 28.29 KG/M2

## 2021-01-11 DIAGNOSIS — K21.00 GASTROESOPHAGEAL REFLUX DISEASE WITH ESOPHAGITIS WITHOUT HEMORRHAGE: ICD-10-CM

## 2021-01-11 DIAGNOSIS — R13.19 ESOPHAGEAL DYSPHAGIA: Primary | ICD-10-CM

## 2021-01-11 PROBLEM — J44.9 CHRONIC OBSTRUCTIVE PULMONARY DISEASE (HCC): Status: ACTIVE | Noted: 2020-09-03

## 2021-01-11 PROBLEM — R35.0 INCREASED FREQUENCY OF URINATION: Status: ACTIVE | Noted: 2020-01-15

## 2021-01-11 PROBLEM — N39.0 BACTERIAL UTI: Status: ACTIVE | Noted: 2020-02-06

## 2021-01-11 PROBLEM — K21.9 GASTROESOPHAGEAL REFLUX DISEASE WITHOUT ESOPHAGITIS: Status: ACTIVE | Noted: 2020-09-03

## 2021-01-11 PROBLEM — N30.01 ACUTE CYSTITIS WITH HEMATURIA: Status: ACTIVE | Noted: 2020-01-15

## 2021-01-11 PROBLEM — R39.15 URINARY URGENCY: Status: ACTIVE | Noted: 2020-02-06

## 2021-01-11 PROBLEM — A49.9 BACTERIAL UTI: Status: ACTIVE | Noted: 2020-02-06

## 2021-01-11 PROBLEM — R06.02 SHORTNESS OF BREATH: Status: ACTIVE | Noted: 2019-11-26

## 2021-01-11 PROBLEM — R52 GENERALIZED BODY ACHES: Status: ACTIVE | Noted: 2020-01-15

## 2021-01-11 PROBLEM — I10 BENIGN ESSENTIAL HTN: Status: ACTIVE | Noted: 2020-09-03

## 2021-01-11 PROCEDURE — 99214 OFFICE O/P EST MOD 30 MIN: CPT | Performed by: NURSE PRACTITIONER

## 2021-01-11 RX ORDER — LOSARTAN POTASSIUM 100 MG/1
100 TABLET ORAL DAILY
COMMUNITY
Start: 2021-01-05

## 2021-01-11 RX ORDER — DEXLANSOPRAZOLE 60 MG/1
60 CAPSULE, DELAYED RELEASE ORAL DAILY
Qty: 30 CAPSULE | Refills: 5 | Status: SHIPPED | OUTPATIENT
Start: 2021-01-11 | End: 2021-01-13 | Stop reason: SDUPTHER

## 2021-01-11 RX ORDER — DEXTROSE AND SODIUM CHLORIDE 5; .45 G/100ML; G/100ML
30 INJECTION, SOLUTION INTRAVENOUS CONTINUOUS PRN
Status: CANCELLED | OUTPATIENT
Start: 2021-02-10

## 2021-01-11 NOTE — PATIENT INSTRUCTIONS
Upper Endoscopy, Adult  Upper endoscopy is a procedure to look inside the upper GI (gastrointestinal) tract. The upper GI tract is made up of:  · The part of the body that moves food from your mouth to your stomach (esophagus).  · The stomach.  · The first part of your small intestine (duodenum).  This procedure is also called esophagogastroduodenoscopy (EGD) or gastroscopy. In this procedure, your health care provider passes a thin, flexible tube (endoscope) through your mouth and down your esophagus into your stomach. A small camera is attached to the end of the tube. Images from the camera appear on a monitor in the exam room. During this procedure, your health care provider may also remove a small piece of tissue to be sent to a lab and examined under a microscope (biopsy).  Your health care provider may do an upper endoscopy to diagnose cancers of the upper GI tract. You may also have this procedure to find the cause of other conditions, such as:  · Stomach pain.  · Heartburn.  · Pain or problems when swallowing.  · Nausea and vomiting.  · Stomach bleeding.  · Stomach ulcers.  Tell a health care provider about:  · Any allergies you have.  · All medicines you are taking, including vitamins, herbs, eye drops, creams, and over-the-counter medicines.  · Any problems you or family members have had with anesthetic medicines.  · Any blood disorders you have.  · Any surgeries you have had.  · Any medical conditions you have.  · Whether you are pregnant or may be pregnant.  What are the risks?  Generally, this is a safe procedure. However, problems may occur, including:  · Infection.  · Bleeding.  · Allergic reactions to medicines.  · A tear or hole (perforation) in the esophagus, stomach, or duodenum.  What happens before the procedure?  Staying hydrated  Follow instructions from your health care provider about hydration, which may include:  · Up to 2 hours before the procedure - you may continue to drink clear  liquids, such as water, clear fruit juice, black coffee, and plain tea.    Eating and drinking restrictions  Follow instructions from your health care provider about eating and drinking, which may include:  · 8 hours before the procedure - stop eating heavy meals or foods, such as meat, fried foods, or fatty foods.  · 6 hours before the procedure - stop eating light meals or foods, such as toast or cereal.  · 6 hours before the procedure - stop drinking milk or drinks that contain milk.  · 2 hours before the procedure - stop drinking clear liquids.  Medicines  Ask your health care provider about:  · Changing or stopping your regular medicines. This is especially important if you are taking diabetes medicines or blood thinners.  · Taking medicines such as aspirin and ibuprofen. These medicines can thin your blood. Do not take these medicines unless your health care provider tells you to take them.  · Taking over-the-counter medicines, vitamins, herbs, and supplements.  General instructions  · Plan to have someone take you home from the hospital or clinic.  · If you will be going home right after the procedure, plan to have someone with you for 24 hours.  · Ask your health care provider what steps will be taken to help prevent infection.  What happens during the procedure?    · An IV will be inserted into one of your veins.  · You may be given one or more of the following:  ? A medicine to help you relax (sedative).  ? A medicine to numb the throat (local anesthetic).  · You will lie on your left side on an exam table.  · Your health care provider will pass the endoscope through your mouth and down your esophagus.  · Your health care provider will use the scope to check the inside of your esophagus, stomach, and duodenum. Biopsies may be taken.  · The endoscope will be removed.  The procedure may vary among health care providers and hospitals.  What happens after the procedure?  · Your blood pressure, heart rate,  breathing rate, and blood oxygen level will be monitored until you leave the hospital or clinic.  · Do not drive for 24 hours if you were given a sedative during your procedure.  · When your throat is no longer numb, you may be given some fluids to drink.  · It is up to you to get the results of your procedure. Ask your health care provider, or the department that is doing the procedure, when your results will be ready.  Summary  · Upper endoscopy is a procedure to look inside the upper GI tract.  · During the procedure, an IV will be inserted into one of your veins. You may be given a medicine to help you relax.  · A medicine will be used to numb your throat.  · The endoscope will be passed through your mouth and down your esophagus.  This information is not intended to replace advice given to you by your health care provider. Make sure you discuss any questions you have with your health care provider.  Document Revised: 06/12/2019 Document Reviewed: 05/20/2019  [employer] Patient Education © 2020 [employer]

## 2021-01-11 NOTE — PROGRESS NOTES
"Chief Complaint  Difficulty Swallowing    Subjective          Sharan Steiner presents to [facility] for   Difficulty Swallowing  This is a recurrent problem. The current episode started more than 1 month ago. The problem occurs intermittently. The problem has been gradually worsening. Pertinent negatives include no abdominal pain, anorexia, arthralgias, change in bowel habit, chest pain, chills, congestion, coughing, diaphoresis, fatigue, fever, headaches, joint swelling, myalgias, nausea, neck pain or numbness. The symptoms are aggravated by swallowing. The treatment provided mild relief.   Plan; schedule patient for EGD due to possible recurrent esophageal stricture we will start patient on Dexilant due to uncontrolled reflux.  Follow-up after test return office sooner.    Objective   Vital Signs:   /93 (BP Location: Left arm)   Pulse 104   Ht 175.3 cm (69\")   Wt 86.6 kg (191 lb)   BMI 28.21 kg/m²     Physical Exam  Constitutional:       General: He is not in acute distress.     Appearance: Normal appearance. He is well-developed.   HENT:      Head: Normocephalic and atraumatic.      Ears:      Comments: Hard of hearing   Neck:      Musculoskeletal: Normal range of motion and neck supple.      Thyroid: No thyromegaly.   Cardiovascular:      Rate and Rhythm: Normal rate and regular rhythm.      Heart sounds: Normal heart sounds.   Pulmonary:      Effort: Pulmonary effort is normal.      Breath sounds: Normal breath sounds. No wheezing, rhonchi or rales.   Abdominal:      General: Bowel sounds are normal. There is no distension.      Palpations: Abdomen is soft. Abdomen is not rigid.      Tenderness: There is abdominal tenderness in the epigastric area. There is no guarding.      Hernia: No hernia is present.   Lymphadenopathy:      Cervical: No cervical adenopathy.   Skin:     General: Skin is warm and dry.      Coloration: Skin is not pale.      Findings: No rash. "   Neurological:      Mental Status: He is alert and oriented to person, place, and time.   Psychiatric:         Speech: Speech normal.         Behavior: Behavior is cooperative.        Result Review :                 Assessment and Plan    Problem List Items Addressed This Visit        Other    Esophageal dysphagia - Primary    Overview     Added automatically from request for surgery [id number]         Relevant Orders    Case Request (Completed)    Gastroesophageal reflux disease with esophagitis without hemorrhage    Overview     Added automatically from request for surgery [id number]         Relevant Orders    Case Request (Completed)        I spent 25 minutes caring for Sharan on this date of service. This time includes time spent by me in the following activities:preparing for the visit, reviewing tests, obtaining and/or reviewing a separately obtained history, performing a medically appropriate examination and/or evaluation , counseling and educating the patient/family/caregiver, ordering medications, tests, or procedures and documenting information in the medical record  Follow Up   Return in about 4 weeks (around 2/8/2021) for Recheck, After test.  Patient was given instructions and counseling regarding his condition or for health maintenance advice. Please see specific information pulled into the AVS if appropriate.

## 2021-01-14 RX ORDER — DEXLANSOPRAZOLE 60 MG/1
60 CAPSULE, DELAYED RELEASE ORAL DAILY
Qty: 30 CAPSULE | Refills: 5 | Status: SHIPPED | OUTPATIENT
Start: 2021-01-14 | End: 2021-02-16

## 2021-02-07 ENCOUNTER — LAB (OUTPATIENT)
Dept: LAB | Facility: HOSPITAL | Age: 81
End: 2021-02-07

## 2021-02-07 DIAGNOSIS — Z01.818 PREOP TESTING: Primary | ICD-10-CM

## 2021-02-07 PROCEDURE — C9803 HOPD COVID-19 SPEC COLLECT: HCPCS

## 2021-02-07 PROCEDURE — U0004 COV-19 TEST NON-CDC HGH THRU: HCPCS

## 2021-02-08 LAB — SARS-COV-2 ORF1AB RESP QL NAA+PROBE: NOT DETECTED

## 2021-02-10 ENCOUNTER — ANESTHESIA (OUTPATIENT)
Dept: GASTROENTEROLOGY | Facility: HOSPITAL | Age: 81
End: 2021-02-10

## 2021-02-10 ENCOUNTER — HOSPITAL ENCOUNTER (OUTPATIENT)
Facility: HOSPITAL | Age: 81
Setting detail: HOSPITAL OUTPATIENT SURGERY
Discharge: HOME | End: 2021-02-10
Attending: INTERNAL MEDICINE | Admitting: INTERNAL MEDICINE

## 2021-02-10 ENCOUNTER — ANESTHESIA EVENT (OUTPATIENT)
Dept: GASTROENTEROLOGY | Facility: HOSPITAL | Age: 81
End: 2021-02-10

## 2021-02-10 VITALS
RESPIRATION RATE: 20 BRPM | HEIGHT: 69 IN | TEMPERATURE: 97.3 F | DIASTOLIC BLOOD PRESSURE: 74 MMHG | BODY MASS INDEX: 28.14 KG/M2 | WEIGHT: 190 LBS | OXYGEN SATURATION: 93 % | SYSTOLIC BLOOD PRESSURE: 154 MMHG | HEART RATE: 75 BPM

## 2021-02-10 DIAGNOSIS — K21.00 GASTROESOPHAGEAL REFLUX DISEASE WITH ESOPHAGITIS WITHOUT HEMORRHAGE: ICD-10-CM

## 2021-02-10 DIAGNOSIS — R13.19 ESOPHAGEAL DYSPHAGIA: ICD-10-CM

## 2021-02-10 PROCEDURE — 25010000002 PROPOFOL 10 MG/ML EMULSION: Performed by: NURSE ANESTHETIST, CERTIFIED REGISTERED

## 2021-02-10 PROCEDURE — 43248 EGD GUIDE WIRE INSERTION: CPT | Performed by: INTERNAL MEDICINE

## 2021-02-10 RX ORDER — DEXTROSE AND SODIUM CHLORIDE 5; .45 G/100ML; G/100ML
30 INJECTION, SOLUTION INTRAVENOUS CONTINUOUS PRN
Status: DISCONTINUED | OUTPATIENT
Start: 2021-02-10 | End: 2021-02-10 | Stop reason: HOSPADM

## 2021-02-10 RX ORDER — LIDOCAINE HYDROCHLORIDE 20 MG/ML
INJECTION, SOLUTION INTRAVENOUS AS NEEDED
Status: DISCONTINUED | OUTPATIENT
Start: 2021-02-10 | End: 2021-02-10 | Stop reason: SURG

## 2021-02-10 RX ORDER — PROPOFOL 10 MG/ML
VIAL (ML) INTRAVENOUS AS NEEDED
Status: DISCONTINUED | OUTPATIENT
Start: 2021-02-10 | End: 2021-02-10 | Stop reason: SURG

## 2021-02-10 RX ADMIN — PROPOFOL 80 MG: 10 INJECTION, EMULSION INTRAVENOUS at 13:53

## 2021-02-10 RX ADMIN — DEXTROSE AND SODIUM CHLORIDE 30 ML/HR: 5; 450 INJECTION, SOLUTION INTRAVENOUS at 13:34

## 2021-02-10 RX ADMIN — LIDOCAINE HYDROCHLORIDE 80 MG: 20 INJECTION, SOLUTION INTRAVENOUS at 13:53

## 2021-02-10 NOTE — ANESTHESIA PREPROCEDURE EVALUATION
Anesthesia Evaluation     Patient summary reviewed and Nursing notes reviewed   NPO Solid Status: > 8 hours  NPO Liquid Status: > 8 hours           Airway   Mallampati: II  TM distance: >3 FB  Neck ROM: full  No difficulty expected  Dental    (+) lower dentures and upper dentures    Pulmonary - normal exam   (+) COPD, shortness of breath,   Cardiovascular - normal exam    (+) hypertension well controlled 2 medications or greater,       Neuro/Psych  (+) seizures well controlled,     GI/Hepatic/Renal/Endo    (+)  GERD,      Musculoskeletal (-) negative ROS    Abdominal  - normal exam   Substance History - negative use     OB/GYN negative ob/gyn ROS         Other      history of cancer                    Anesthesia Plan    ASA 3     MAC     intravenous induction     Anesthetic plan, all risks, benefits, and alternatives have been provided, discussed and informed consent has been obtained with: patient.

## 2021-02-10 NOTE — ANESTHESIA POSTPROCEDURE EVALUATION
Patient: Sharan Steiner    Procedure Summary     Date: 02/10/21 Room / Location: [facility] ENDOSCOPY 3 / [facility] ENDOSCOPY    Anesthesia Start: 1351 Anesthesia Stop: 1402    Procedure: ESOPHAGOGASTRODUODENOSCOPY possible dilation Monday appt (N/A ) Diagnosis:       Esophageal dysphagia      Gastroesophageal reflux disease with esophagitis without hemorrhage      (Esophageal dysphagia [R13.10])      (Gastroesophageal reflux disease with esophagitis without hemorrhage [K21.00])    Surgeon: Jeff Crabtree MD Provider: Georgie Salmeron CRNA    Anesthesia Type: MAC ASA Status: 3          Anesthesia Type: MAC    Vitals  No vitals data found for the desired time range.          Post Anesthesia Care and Evaluation    Patient location during evaluation: bedside  Patient participation: waiting for patient participation  Level of consciousness: sleepy but conscious  Pain score: 0  Pain management: adequate  Airway patency: patent  Anesthetic complications: No anesthetic complications  PONV Status: none  Cardiovascular status: acceptable  Respiratory status: acceptable  Hydration status: acceptable

## 2021-02-16 ENCOUNTER — OFFICE VISIT (OUTPATIENT)
Dept: GASTROENTEROLOGY | Facility: CLINIC | Age: 81
End: 2021-02-16

## 2021-02-16 DIAGNOSIS — K22.2 STRICTURE AND STENOSIS OF ESOPHAGUS: ICD-10-CM

## 2021-02-16 DIAGNOSIS — B37.81 CANDIDA ESOPHAGITIS (HCC): Primary | ICD-10-CM

## 2021-02-16 DIAGNOSIS — K21.00 GASTROESOPHAGEAL REFLUX DISEASE WITH ESOPHAGITIS WITHOUT HEMORRHAGE: ICD-10-CM

## 2021-02-16 PROCEDURE — 99442 PR PHYS/QHP TELEPHONE EVALUATION 11-20 MIN: CPT | Performed by: NURSE PRACTITIONER

## 2021-02-16 RX ORDER — ESOMEPRAZOLE MAGNESIUM 40 MG/1
40 CAPSULE, DELAYED RELEASE ORAL DAILY
COMMUNITY
Start: 2021-01-14 | End: 2021-02-16 | Stop reason: SDUPTHER

## 2021-02-16 RX ORDER — ESOMEPRAZOLE MAGNESIUM 40 MG/1
40 CAPSULE, DELAYED RELEASE ORAL DAILY
Qty: 30 CAPSULE | Refills: 11 | Status: SHIPPED | OUTPATIENT
Start: 2021-02-16 | End: 2021-06-17

## 2021-02-16 RX ORDER — FLUCONAZOLE 100 MG/1
TABLET ORAL
Qty: 15 TABLET | Refills: 0 | Status: SHIPPED | OUTPATIENT
Start: 2021-02-16 | End: 2021-09-28

## 2021-02-16 NOTE — PROGRESS NOTES
Chief Complaint  Difficulty Swallowing    Subjective          Sharan Steiner presents to [facility]STR[facility]OLOG[facility]  History of Present Illness    80-year-old male presents via telephone visit for follow-up after EGD.  Reports since procedure swallowing has been much better and has overall done very well.  EGD noted benign-appearing stenosis dilated 54 Bulgarian gastritis.  Normal duodenum.  Diffuse yellow plaques were found throughout the entire esophagus.  No specimens were collected.  He reports reflux is well controlled Nexium he was not able to obtain Dexilant due to cost.    Review of Systems   Constitutional: Negative for activity change, appetite change, chills, diaphoresis, fatigue and fever.   HENT: Negative for congestion, dental problem, drooling, ear discharge, ear pain, facial swelling, hearing loss, mouth sores, nosebleeds, postnasal drip, rhinorrhea, sinus pressure, sneezing, sore throat, tinnitus, trouble swallowing (improved since EGD ) and voice change.      Objective   Vital Signs:   There were no vitals taken for this visit.    Physical Exam   Result Review :                 Assessment and Plan    Diagnoses and all orders for this visit:    1. Candida esophagitis (CMS/[facility]) (Primary)    2. Gastroesophageal reflux disease with esophagitis without hemorrhage    3. Stricture and stenosis of esophagus    Other orders  -     fluconazole (Diflucan) 100 MG tablet; Take 2 tablets on day one then 1 tablet daily  Dispense: 15 tablet; Refill: 0  -     esomeprazole (nexIUM) 40 MG capsule; Take 1 capsule by mouth Daily.  Dispense: 30 capsule; Refill: 11    Will treat continual esophagitis with Diflucan.  Follow-up if symptoms persist or worsen.  Continue Nexium daily for GERD follow-up in office if symptoms of dysphagia recur or worsen may need repeat dilation.    I spent 20 minutes caring for Sharan on this date of service. This time includes time spent by me in the following  activities:preparing for the visit, reviewing tests, obtaining and/or reviewing a separately obtained history, counseling and educating the patient/family/caregiver, ordering medications, tests, or procedures, documenting information in the medical record and care coordination  Follow Up   Return if symptoms worsen or fail to improve.  Patient was given instructions and counseling regarding his condition or for health maintenance advice. Please see specific information pulled into the AVS if appropriate.

## 2021-02-16 NOTE — PATIENT INSTRUCTIONS
Esophageal Stricture    Esophageal stricture is a narrowing (stricture) of the esophagus. The esophagus is the part of the body that moves food and liquid from your mouth to your stomach. The esophagus can become narrow because of disease or damage to the area. This condition can make swallowing difficult, painful, or even impossible. It also makes choking more likely.  What are the causes?  The most common cause of this condition is gastroesophageal reflux disease (GERD). Normally, food travels down the esophagus and stays in the stomach to be digested. In GERD, food and stomach acid move back up into the esophagus. Over time, this causes scar tissue and leads to narrowing.  Other causes of esophageal stricture include:  · Scarring from swallowing (ingesting) a harmful substance.  · Damage from medical instruments used in the esophagus.  · Radiation therapy.  · Cancer.  · Inflammation of the esophagus.  What increases the risk?  You are more likely to develop an esophageal stricture if you have GERD or esophageal cancer.  What are the signs or symptoms?  Symptoms of this condition include:  · Difficulty swallowing.  · Pain when swallowing.  · Burning pain or discomfort in the throat or chest (heartburn).  · Vomiting or spitting up (regurgitating) food or liquids.  · Unexplained weight loss.  How is this diagnosed?  This condition may be diagnosed based on:  · Your symptoms and a physical exam.  · Tests, such as:  ? Upper endoscopy. Your health care provider will insert a flexible tube with a tiny camera on it (endoscope) into your esophagus to check for a stricture. A tissue sample (biopsy) may also be taken to be examined under a microscope.  ? Esophageal pH monitoring. This test involves using a tube to collect acid in the esophagus to determine how much stomach acid is entering the esophagus.  ? Barium swallow test. For this test, you will drink a chalky liquid (barium solution) that coats the lining of the  esophagus. Then you will have an X-ray taken. The barium solution helps to show if there is a stricture.  How is this treated?  Treatment for esophageal stricture depends on what is causing your condition and how severe your condition is. Treatment options include:  · Esophageal dilation. In this procedure, a health care provider inserts an endoscope or a tool called a dilator into the esophagus to gently stretch it and make the opening wider.  · Stents. In some cases, a health care provider may place a small device (stent) in the esophagus to keep it open.  · Acid-blocking medicines. Taking these can help you manage GERD symptoms after an esophageal stricture. Controlling your GERD symptoms or being free of them can prevent the stricture from returning.  Follow these instructions at home:  Eating and drinking         · Follow instructions from your health care provider about any diet changes.  · Cut your food into small pieces, chew well, and eat slowly  · Try to eat soft food that is easier to swallow.  · Eat and drink only when you are sitting upright.  · Do not drink alcohol. If you need help quitting, ask your health care provider.  · Do not eat during the 3 hours before bedtime.  · Do not overeat at meals.  · Do not eat foods that can make reflux worse. These include:  ? Fatty foods, such as red meat and processed foods.  ? Spicy foods.  ? Soda.  ? Tomato products.  ? Chocolate.  General instructions  · Take over-the-counter and prescription medicines only as told by your health care provider.  · Do not use any products that contain nicotine or tobacco, such as cigarettes and e-cigarettes. If you need help quitting, ask your health care provider.  · Lose weight if you are overweight.  · Wear loose, comfortable clothing.  · When lying in bed, raise (elevate) your head with pillows. This will help to prevent your stomach contents from backing up into your esophagus while you sleep.  · Keep all follow-up visits  as told by your health care provider. This is important.  Contact a health care provider if:  · You have problems eating or swallowing.  · You regurgitate food and liquid.  · Your symptoms do not improve with treatment.  Get help right away if:  · You can no longer keep down any food, drinks, or your saliva.  Summary  · Esophageal stricture is a narrowing of the part of the body that moves food and liquid from your mouth to your stomach (esophagus).  · The esophagus can become narrow because of disease or damage to the area. This can make swallowing difficult, painful, or even impossible.  · Treatment for esophageal stricture depends on what is causing your condition and how severe your condition is. In some cases, procedures may be done to make the opening of the esophagus wider or to place a stent in the esophagus to keep it open.  · Do not drink alcohol, overeat at meals, or eat foods that can make reflux worse.  This information is not intended to replace advice given to you by your health care provider. Make sure you discuss any questions you have with your health care provider.  Document Revised: 03/15/2019 Document Reviewed: 08/24/2018  [employer] Patient Education © 2020 [employer]

## 2021-05-05 ENCOUNTER — OFFICE VISIT (OUTPATIENT)
Dept: GASTROENTEROLOGY | Facility: CLINIC | Age: 81
End: 2021-05-05

## 2021-05-05 ENCOUNTER — TELEPHONE (OUTPATIENT)
Dept: GASTROENTEROLOGY | Facility: CLINIC | Age: 81
End: 2021-05-05

## 2021-05-05 VITALS
SYSTOLIC BLOOD PRESSURE: 132 MMHG | BODY MASS INDEX: 27.88 KG/M2 | HEIGHT: 69 IN | WEIGHT: 188.2 LBS | HEART RATE: 74 BPM | DIASTOLIC BLOOD PRESSURE: 69 MMHG

## 2021-05-05 DIAGNOSIS — K21.00 GASTROESOPHAGEAL REFLUX DISEASE WITH ESOPHAGITIS WITHOUT HEMORRHAGE: Primary | ICD-10-CM

## 2021-05-05 DIAGNOSIS — Z87.19 HISTORY OF ESOPHAGEAL STRICTURE: ICD-10-CM

## 2021-05-05 DIAGNOSIS — R13.19 ESOPHAGEAL DYSPHAGIA: ICD-10-CM

## 2021-05-05 PROCEDURE — 99213 OFFICE O/P EST LOW 20 MIN: CPT | Performed by: NURSE PRACTITIONER

## 2021-05-05 RX ORDER — DEXTROSE AND SODIUM CHLORIDE 5; .45 G/100ML; G/100ML
30 INJECTION, SOLUTION INTRAVENOUS CONTINUOUS PRN
Status: CANCELLED | OUTPATIENT
Start: 2021-05-26

## 2021-05-05 RX ORDER — PANTOPRAZOLE SODIUM 40 MG/1
40 TABLET, DELAYED RELEASE ORAL DAILY
COMMUNITY
Start: 2021-03-24 | End: 2021-05-05

## 2021-05-05 NOTE — PROGRESS NOTES
Chief Complaint   Patient presents with   • Difficulty Swallowing       Subjective    Sharan Steiner is a 80 y.o. male. he is here today for follow-up.  80-year-old male presents for recheck regarding recurrent dysphagia.  He underwent EGD with dilation in February.  Continual esophagitis was noted and we treated him with Diflucan.  Reflux is well controlled Nexium he would like to continue that but states recently he has had more issues with swallowing and persistent acid reflux.  In 2019 he had similar onset of symptoms and required dilation x2 before symptoms fully resolved.    Difficulty Swallowing  Associated symptoms include fatigue. Pertinent negatives include no abdominal pain, arthralgias, chest pain, chills, coughing, diaphoresis, fever, nausea, sore throat or vomiting.   Heartburn  He complains of dysphagia, early satiety, heartburn and a hoarse voice. He reports no abdominal pain, no belching, no chest pain, no choking, no coughing, no globus sensation, no nausea or no sore throat. The current episode started more than 1 month ago. The problem occurs frequently. The problem has been gradually worsening. The heartburn duration is several minutes. The heartburn is of moderate intensity. Exacerbated by: swallowing anything occ juices and teas  Associated symptoms include fatigue. He has tried a PPI (on nexium, protonix did not help ) for the symptoms. Past procedures include an EGD.     Plan; schedule patient for EGD with dilation due to recurrent dysphagia worsening reflux recommend he continue Nexium daily avoid gastric irritants and discussing swallowing measures.       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   • Cortical senile cataract    • Eyelid cancer     Left  eyelid Sebacous Cancer    • History of transfusion    • Hypertension    • Lymphoma of testis  (CMS/[facility]) 11/2016   • Seizures (CMS/[facility])      Past Surgical History:   Procedure Laterality Date   • BACK SURGERY     • ENDOSCOPY N/A 7/17/2019    Procedure: ESOPHAGOGASTRODUODENOSCOPY possible dilation;  Surgeon: Jeff Crabtree MD;  Location: [facility] ENDOSCOPY;  Service: Gastroenterology   • ENDOSCOPY N/A 12/9/2019    Procedure: ESOPHAGOGASTRODUODENOSCOPY possible dilation;  Surgeon: Jeff Crabtree MD;  Location: [facility] ENDOSCOPY;  Service: Gastroenterology   • ENDOSCOPY N/A 2/10/2021    Procedure: ESOPHAGOGASTRODUODENOSCOPY possible dilation Monday appt;  Surgeon: Jeff Crabtree MD;  Location: [facility] ENDOSCOPY;  Service: Gastroenterology;  Laterality: N/A;  savory dilation 48-54 fr   • ORCHIECTOMY     • UPPER GASTROINTESTINAL ENDOSCOPY  02/10/2021     Family History   Problem Relation Age of Onset   • Diabetes Mother    • Hypertension Sister    • Cancer Sister    • Hypertension Brother    • Cancer Brother        Prior to Admission medications    Medication Sig Start Date End Date Taking? Authorizing Provider   aspirin 325 MG EC tablet Take 325 mg by mouth Daily.   Yes Monique Melendez MD   atenolol (TENORMIN) 50 MG tablet Take 50 mg by mouth Daily. 12/13/16  Yes Monique Melendez MD   doxazosin (CARDURA) 4 MG tablet Take 4 mg by mouth Daily.   Yes Monique Melendez MD   fexofenadine (ALLEGRA) 180 MG tablet Take 180 mg by mouth Daily.   Yes Monique Melendez MD   fluconazole (Diflucan) 100 MG tablet Take 2 tablets on day one then 1 tablet daily 2/16/21  Yes Nury Ortega APRN   fluticasone (FLONASE) 50 MCG/ACT nasal spray 2 sprays into each nostril 2 (Two) Times a Day. Administer 2 sprays in each nostril for each dose.  3/2/16  Yes Monique Melendez MD   Fluticasone Furoate-Vilanterol (BREO ELLIPTA IN) Inhale 1 puff Daily.   Yes Monique Melendez MD   levETIRAcetam (KEPPRA) 500 MG tablet Take 1 tablet by mouth 2 (Two) Times a Day. 2/2/17  Yes Domingo Acosta MD   losartan (COZAAR)  100 MG tablet Take 100 mg by mouth Daily. 21  Yes Provider, MD Monique   VENTOLIN  (90 Base) MCG/ACT inhaler Inhale 2 puffs Every 6 (Six) Hours As Needed. 19  Yes Provider, MD Monique   pantoprazole (PROTONIX) 40 MG EC tablet Take 40 mg by mouth Daily. 3/24/21 5/5/21 Yes ProviderMonique MD   esomeprazole (nexIUM) 40 MG capsule Take 1 capsule by mouth Daily. 21   Nury Ortega APRN     Allergies   Allergen Reactions   • Levofloxacin Other (See Comments)     Seizures   • Penicillins Anaphylaxis   • Crestor [Rosuvastatin Calcium] Other (See Comments)     Rhabdo   • Tramadol Other (See Comments)     Seizures   • Sulfa Antibiotics Rash     Social History     Socioeconomic History   • Marital status:      Spouse name: Not on file   • Number of children: Not on file   • Years of education: Not on file   • Highest education level: Not on file   Tobacco Use   • Smoking status: Former Smoker     Quit date:      Years since quittin.3   • Smokeless tobacco: Never Used   Vaping Use   • Vaping Use: Never used   Substance and Sexual Activity   • Alcohol use: No   • Drug use: No   • Sexual activity: Never       Review of Systems  Review of Systems   Constitutional: Positive for fatigue. Negative for activity change, appetite change, chills, diaphoresis, fever and unexpected weight change.   HENT: Positive for hoarse voice. Negative for sore throat and trouble swallowing.    Respiratory: Positive for shortness of breath. Negative for cough and choking.    Cardiovascular: Negative for chest pain.   Gastrointestinal: Positive for dysphagia and heartburn. Negative for abdominal distention, abdominal pain, anal bleeding, blood in stool, constipation, diarrhea, nausea, rectal pain and vomiting.   Musculoskeletal: Negative for arthralgias.   Skin: Negative for pallor.   Neurological: Positive for dizziness (chronic vertigo ). Negative for light-headedness.        /69 (BP Location:  "Right arm)   Pulse 74   Ht 175.3 cm (69\")   Wt 85.4 kg (188 lb 3.2 oz)   BMI 27.79 kg/m²     Objective    Physical Exam  Constitutional:       General: He is not in acute distress.     Appearance: Normal appearance. He is well-developed.   HENT:      Head: Normocephalic and atraumatic.   Neck:      Thyroid: No thyromegaly.   Cardiovascular:      Rate and Rhythm: Normal rate and regular rhythm.      Heart sounds: Normal heart sounds.   Pulmonary:      Effort: Pulmonary effort is normal.      Breath sounds: Normal breath sounds. No wheezing, rhonchi or rales.   Abdominal:      General: Bowel sounds are normal. There is no distension.      Palpations: Abdomen is soft. Abdomen is not rigid.      Tenderness: There is abdominal tenderness in the epigastric area.   Musculoskeletal:      Cervical back: Normal range of motion and neck supple.   Lymphadenopathy:      Cervical: No cervical adenopathy.   Skin:     General: Skin is warm and dry.      Coloration: Skin is not pale.      Findings: No rash.   Neurological:      Mental Status: He is alert and oriented to person, place, and time.   Psychiatric:         Speech: Speech normal.         Behavior: Behavior is cooperative.       Lab on 02/07/2021   Component Date Value Ref Range Status   • COVID19 02/07/2021 Not Detected  Not Detected - Ref. Range Final     Assessment/Plan      1. Gastroesophageal reflux disease with esophagitis without hemorrhage    2. History of esophageal stricture    3. Esophageal dysphagia    .       Orders placed during this encounter include:  Orders Placed This Encounter   Procedures   • Follow Anesthesia Guidelines / Standing Orders     Standing Status:   Future   • Obtain Informed Consent     Standing Status:   Future     Order Specific Question:   Informed Consent Given For     Answer:   ESOPHAGOGASTRODUODENOSCOPY possible dilation       ESOPHAGOGASTRODUODENOSCOPY possible dilation (N/A)    Review and/or summary of lab tests, radiology, " procedures, medications. Review and summary of old records and obtaining of history. The risks and benefits of my recommendations, as well as other treatment options were discussed with the patient today. Questions were answered.    No orders of the defined types were placed in this encounter.      Follow-up: Return in about 4 weeks (around 6/2/2021) for Recheck, After test.          This document has been electronically signed by OSEI Harper on May 5, 2021 14:20 CDT           I spent 22 minutes caring for Sharan on this date of service. This time includes time spent by me in the following activities:preparing for the visit, reviewing tests, obtaining and/or reviewing a separately obtained history, performing a medically appropriate examination and/or evaluation , counseling and educating the patient/family/caregiver, ordering medications, tests, or procedures, referring and communicating with other health care professionals , documenting information in the medical record and care coordination    Results for orders placed or performed in visit on 02/07/21   COVID-19,[facility] PANTHER,[facility] IN-[facility], NP/OP SWAB IN UTM/VTM/[facility] TRANSPORT MEDIA,24 HR TAT - Swab, Nasopharynx    Specimen: Nasopharynx; Swab   Result Value Ref Range    COVID19 Not Detected Not Detected - Ref. Range   Results for orders placed or performed during the hospital encounter of 07/17/19   Tissue Pathology Exam    Specimen: A: Gastric, Antrum; Tissue    B: Esophagus, Distal; Tissue   Result Value Ref Range    Case Report       Surgical Pathology Report                         Case: [id number]                                  Authorizing Provider:  Jeff Crabtree MD        Collected:           07/17/2019 05:30 PM          Ordering Location:     [facility]             Received:            07/18/2019 06:14 AM                                 [facility]                                                     Pathologist:            Srinivas, Domenic WOO MD                                                        Specimens:   1) - Gastric, Antrum, antrum  cold bx                                                               2) - Esophagus, Distal, distal esophagus   cold bx                                         Final Diagnosis       1.  GASTRIC ANTRUM, MUCOSAL BIOPSY:  MILD CHRONIC GASTRITIS.  NO EVIDENCE OF HELICOBACTER PYLORI.    2.  DISTAL ESOPHAGUS, MUCOSAL BIOPSY:  SEGMENTS OF MILDLY INFLAMED STRATIFIED SQUAMOUS EPITHELIUM.      Gross Description       In 2 containers, each of these show mucosal biopsies measuring up to 0.3 cm in greatest dimension.  Embedded accordingly.  1A antrum; 2A distal esophagus.     Results for orders placed or performed in visit on 03/13/18   Tissue Pathology Exam    Specimen: Clavicle, Right; Hardware / Foreign Body   Result Value Ref Range    Case Report       Surgical Pathology Report                         Case: [id number]                                  Authorizing Provider:  Elroy Lucas MD Collected:           03/13/2018 03:35 PM          Ordering Location:     [facility]     Received:            03/14/2018 07:48 AM                                 [facility]                                                        Pathologist:           Domenic Espino MD                                                         Specimen:    Clavicle, Right, Mediport                                                                  Clinical Information       Removal of Mediport right clavicular area.      Final Diagnosis       OLD MEDIPORT.      Gross Description       The specimen consists of a Mediport with a 25.0 cm rubber lead.      Embedded Images     Results for orders placed or performed in visit on 03/09/18   CBC Auto Differential    Specimen: Blood   Result Value Ref Range    WBC 6.86 3.20 - 9.80 10*3/mm3    RBC 4.28 (L) 4.37 - 5.74 10*6/mm3    Hemoglobin 12.2 (L) 13.7 - 17.3 g/dL     Hematocrit 35.7 (L) 39.0 - 49.0 %    MCV 83.4 80.0 - 98.0 fL    MCH 28.5 26.5 - 34.0 pg    MCHC 34.2 31.5 - 36.3 g/dL    RDW 12.4 11.5 - 14.5 %    RDW-SD 37.6 35.1 - 43.9 fl    MPV 9.8 8.0 - 12.0 fL    Platelets 185 150 - 450 10*3/mm3    Neutrophil % 69.1 37.0 - 80.0 %    Lymphocyte % 10.6 10.0 - 50.0 %    Monocyte % 12.7 (H) 0.0 - 12.0 %    Eosinophil % 6.6 0.0 - 7.0 %    Basophil % 0.6 0.0 - 2.0 %    Immature Grans % 0.4 0.0 - 0.5 %    Neutrophils, Absolute 4.74 2.00 - 8.60 10*3/mm3    Lymphocytes, Absolute 0.73 0.60 - 4.20 10*3/mm3    Monocytes, Absolute 0.87 0.00 - 0.90 10*3/mm3    Eosinophils, Absolute 0.45 0.00 - 0.70 10*3/mm3    Basophils, Absolute 0.04 0.00 - 0.20 10*3/mm3    Immature Grans, Absolute 0.03 (H) 0.00 - 0.02 10*3/mm3   Lactate Dehydrogenase    Specimen: Blood   Result Value Ref Range     313 - 618 U/L   Comprehensive Metabolic Panel    Specimen: Blood   Result Value Ref Range    Glucose 94 60 - 100 mg/dL    BUN 11 7 - 21 mg/dL    Creatinine 0.74 0.70 - 1.30 mg/dL    Sodium 137 137 - 145 mmol/L    Potassium 4.5 3.5 - 5.1 mmol/L    Chloride 97 95 - 110 mmol/L    CO2 27.0 22.0 - 31.0 mmol/L    Calcium 9.0 8.4 - 10.2 mg/dL    Total Protein 6.6 6.3 - 8.6 g/dL    Albumin 3.90 3.40 - 4.80 g/dL    ALT (SGPT) 29 21 - 72 U/L    AST (SGOT) 26 17 - 59 U/L    Alkaline Phosphatase 110 38 - 126 U/L    Total Bilirubin 0.4 0.2 - 1.3 mg/dL    eGFR Non  Amer 102 (H) 42 - 98 mL/min/1.73    Globulin 2.7 2.3 - 3.5 gm/dL    A/G Ratio 1.4 1.1 - 1.8 g/dL    BUN/Creatinine Ratio 14.9 7.0 - 25.0    Anion Gap 13.0 5.0 - 15.0 mmol/L   Results for orders placed or performed in visit on 12/08/17   Iron and TIBC    Specimen: Blood   Result Value Ref Range    Iron 82 49 - 181 mcg/dL    TIBC 378 261 - 462 mcg/dL    Iron Saturation 22 20 - 55 %   Folate    Specimen: Blood   Result Value Ref Range    Folate >20.00 2.76 - 21.00 ng/mL   Ferritin    Specimen: Blood   Result Value Ref Range    Ferritin 55.30 17.90 -  464.00 ng/mL   Vitamin B12    Specimen: Blood   Result Value Ref Range    Vitamin B-12 298 239 - 931 pg/mL   Results for orders placed or performed in visit on 12/08/17   CBC Auto Differential    Specimen: Blood   Result Value Ref Range    WBC 5.61 3.20 - 9.80 10*3/mm3    RBC 4.14 (L) 4.37 - 5.74 10*6/mm3    Hemoglobin 12.1 (L) 13.7 - 17.3 g/dL    Hematocrit 34.5 (L) 39.0 - 49.0 %    MCV 83.3 80.0 - 98.0 fL    MCH 29.2 26.5 - 34.0 pg    MCHC 35.1 31.5 - 36.3 g/dL    RDW 12.7 11.5 - 14.5 %    RDW-SD 38.5 35.1 - 43.9 fl    MPV 9.6 8.0 - 12.0 fL    Platelets 172 150 - 450 10*3/mm3    Neutrophil % 66.4 37.0 - 80.0 %    Lymphocyte % 18.5 10.0 - 50.0 %    Monocyte % 9.6 0.0 - 12.0 %    Eosinophil % 4.8 0.0 - 7.0 %    Basophil % 0.5 0.0 - 2.0 %    Immature Grans % 0.2 0.0 - 0.5 %    Neutrophils, Absolute 3.72 2.00 - 8.60 10*3/mm3    Lymphocytes, Absolute 1.04 0.60 - 4.20 10*3/mm3    Monocytes, Absolute 0.54 0.00 - 0.90 10*3/mm3    Eosinophils, Absolute 0.27 0.00 - 0.70 10*3/mm3    Basophils, Absolute 0.03 0.00 - 0.20 10*3/mm3    Immature Grans, Absolute 0.01 0.00 - 0.02 10*3/mm3   Lactate Dehydrogenase    Specimen: Blood   Result Value Ref Range     313 - 618 U/L   Comprehensive Metabolic Panel    Specimen: Blood   Result Value Ref Range    Glucose 87 60 - 100 mg/dL    BUN 11 7 - 21 mg/dL    Creatinine 0.85 0.70 - 1.30 mg/dL    Sodium 137 137 - 145 mmol/L    Potassium 4.3 3.5 - 5.1 mmol/L    Chloride 101 95 - 110 mmol/L    CO2 28.0 22.0 - 31.0 mmol/L    Calcium 9.4 8.4 - 10.2 mg/dL    Total Protein 6.7 6.3 - 8.6 g/dL    Albumin 4.00 3.40 - 4.80 g/dL    ALT (SGPT) 32 21 - 72 U/L    AST (SGOT) 33 17 - 59 U/L    Alkaline Phosphatase 101 38 - 126 U/L    Total Bilirubin 0.5 0.2 - 1.3 mg/dL    eGFR Non African Amer 87 42 - 98 mL/min/1.73    Globulin 2.7 2.3 - 3.5 gm/dL    A/G Ratio 1.5 1.1 - 1.8 g/dL    BUN/Creatinine Ratio 12.9 7.0 - 25.0    Anion Gap 8.0 5.0 - 15.0 mmol/L     *Note: Due to a large number of results  and/or encounters for the requested time period, some results have not been displayed. A complete set of results can be found in Results Review.

## 2021-05-05 NOTE — TELEPHONE ENCOUNTER
Contacted patients daughter Elizabeth, regarding instructions prior to EGD. After speaking with Dr Crabtree, Elizabeth was instructed to stop the aspirin the day of the procedure. Dr Crabtree stated it would be fine to continue it up until the day of procedure. Elizabeth voiced understanding and also confirmed date of covid test.

## 2021-05-25 ENCOUNTER — LAB (OUTPATIENT)
Dept: LAB | Facility: HOSPITAL | Age: 81
End: 2021-05-25

## 2021-05-25 DIAGNOSIS — Z01.818 PREOP TESTING: ICD-10-CM

## 2021-05-25 LAB — SARS-COV-2 N GENE RESP QL NAA+PROBE: NOT DETECTED

## 2021-05-25 PROCEDURE — C9803 HOPD COVID-19 SPEC COLLECT: HCPCS

## 2021-05-25 PROCEDURE — 87635 SARS-COV-2 COVID-19 AMP PRB: CPT

## 2021-05-26 ENCOUNTER — HOSPITAL ENCOUNTER (OUTPATIENT)
Facility: HOSPITAL | Age: 81
Setting detail: HOSPITAL OUTPATIENT SURGERY
Discharge: HOME | End: 2021-05-26
Attending: INTERNAL MEDICINE | Admitting: INTERNAL MEDICINE

## 2021-05-26 ENCOUNTER — ANESTHESIA (OUTPATIENT)
Dept: GASTROENTEROLOGY | Facility: HOSPITAL | Age: 81
End: 2021-05-26

## 2021-05-26 ENCOUNTER — ANESTHESIA EVENT (OUTPATIENT)
Dept: GASTROENTEROLOGY | Facility: HOSPITAL | Age: 81
End: 2021-05-26

## 2021-05-26 VITALS
DIASTOLIC BLOOD PRESSURE: 86 MMHG | HEART RATE: 69 BPM | HEIGHT: 69 IN | TEMPERATURE: 97.1 F | SYSTOLIC BLOOD PRESSURE: 167 MMHG | OXYGEN SATURATION: 3 % | RESPIRATION RATE: 16 BRPM | BODY MASS INDEX: 27.13 KG/M2 | WEIGHT: 183.2 LBS

## 2021-05-26 DIAGNOSIS — Z87.19 HISTORY OF ESOPHAGEAL STRICTURE: ICD-10-CM

## 2021-05-26 DIAGNOSIS — K21.00 GASTROESOPHAGEAL REFLUX DISEASE WITH ESOPHAGITIS WITHOUT HEMORRHAGE: ICD-10-CM

## 2021-05-26 DIAGNOSIS — R13.19 ESOPHAGEAL DYSPHAGIA: ICD-10-CM

## 2021-05-26 PROCEDURE — 43248 EGD GUIDE WIRE INSERTION: CPT | Performed by: INTERNAL MEDICINE

## 2021-05-26 PROCEDURE — 25010000002 PROPOFOL 10 MG/ML EMULSION: Performed by: NURSE ANESTHETIST, CERTIFIED REGISTERED

## 2021-05-26 RX ORDER — LIDOCAINE HYDROCHLORIDE 20 MG/ML
INJECTION, SOLUTION EPIDURAL; INFILTRATION; INTRACAUDAL; PERINEURAL AS NEEDED
Status: DISCONTINUED | OUTPATIENT
Start: 2021-05-26 | End: 2021-05-26 | Stop reason: SURG

## 2021-05-26 RX ORDER — DEXTROSE AND SODIUM CHLORIDE 5; .45 G/100ML; G/100ML
30 INJECTION, SOLUTION INTRAVENOUS CONTINUOUS PRN
Status: DISCONTINUED | OUTPATIENT
Start: 2021-05-26 | End: 2021-05-26 | Stop reason: HOSPADM

## 2021-05-26 RX ORDER — PROPOFOL 10 MG/ML
VIAL (ML) INTRAVENOUS AS NEEDED
Status: DISCONTINUED | OUTPATIENT
Start: 2021-05-26 | End: 2021-05-26 | Stop reason: SURG

## 2021-05-26 RX ADMIN — PROPOFOL 70 MG: 10 INJECTION, EMULSION INTRAVENOUS at 10:08

## 2021-05-26 RX ADMIN — DEXTROSE AND SODIUM CHLORIDE 30 ML/HR: 5; 450 INJECTION, SOLUTION INTRAVENOUS at 09:09

## 2021-05-26 RX ADMIN — LIDOCAINE HYDROCHLORIDE 50 MG: 20 INJECTION, SOLUTION EPIDURAL; INFILTRATION; INTRACAUDAL; PERINEURAL at 10:08

## 2021-05-26 NOTE — ANESTHESIA PREPROCEDURE EVALUATION
Anesthesia Evaluation     Patient summary reviewed and Nursing notes reviewed   no history of anesthetic complications:  NPO Solid Status: > 8 hours  NPO Liquid Status: > 8 hours           Airway   Mallampati: II  TM distance: >3 FB  Neck ROM: full  No difficulty expected  Dental    (+) lower dentures and upper dentures    Pulmonary - normal exam   (+) COPD moderate, shortness of breath,   Cardiovascular - normal exam    ECG reviewed  Patient on routine beta blocker    (+) hypertension well controlled 2 medications or greater,     ROS comment: 2017  · Left Ventricle: Left ventricular function is normal. Estimated EF appears to be in the range of greater than 70%  · The left ventricular cavity is mildly dilated.  · Left ventricular wall thickness is consistent with severe concentric hypertrophy.  · Left ventricular diastolic dysfunction is noted (grade I) consistent with impaired relaxation. Normal left atrial pressure  · Right Ventricle: Normal cavity size, wall thickness, systolic function and septal motion noted.  · Mild aortic valve regurgitation is present.  · Mild to moderate tricuspid valve regurgitation is present  · Estimated right ventricular systolic pressure from tricuspid regurgitation is moderately elevated (45-55 mmHg).  · Mild pulmonary hypertension is present.  · There is no evidence of pericardial effusion.    Neuro/Psych  (+) seizures well controlled,     GI/Hepatic/Renal/Endo    (+)  GERD well controlled,      Musculoskeletal (-) negative ROS    Abdominal  - normal exam   Substance History - negative use     OB/GYN          Other      history of cancer remission                      Anesthesia Plan    ASA 3     MAC     intravenous induction     Anesthetic plan, all risks, benefits, and alternatives have been provided, discussed and informed consent has been obtained with: patient.

## 2021-05-26 NOTE — ANESTHESIA POSTPROCEDURE EVALUATION
Patient: Sharan Steiner    Procedure Summary     Date: 05/26/21 Room / Location: [facility] ENDOSCOPY 3 / [facility] ENDOSCOPY    Anesthesia Start: 1003 Anesthesia Stop: 1019    Procedure: ESOPHAGOGASTRODUODENOSCOPY possible dilation (N/A ) Diagnosis:       Gastroesophageal reflux disease with esophagitis without hemorrhage      History of esophageal stricture      Esophageal dysphagia      (Gastroesophageal reflux disease with esophagitis without hemorrhage [K21.00])      (History of esophageal stricture [Z87.19])      (Esophageal dysphagia [R13.10])    Surgeons: Jeff Crabtree MD Provider: Marilee Rubi CRNA    Anesthesia Type: MAC ASA Status: 3          Anesthesia Type: MAC    Vitals  No vitals data found for the desired time range.          Post Anesthesia Care and Evaluation    Patient location during evaluation: bedside  Patient participation: complete - patient participated  Level of consciousness: sleepy but conscious  Pain management: adequate  Airway patency: patent  Anesthetic complications: No anesthetic complications  PONV Status: none  Cardiovascular status: acceptable  Respiratory status: acceptable  Hydration status: acceptable

## 2021-06-14 ENCOUNTER — OFFICE VISIT (OUTPATIENT)
Dept: GASTROENTEROLOGY | Facility: CLINIC | Age: 81
End: 2021-06-14

## 2021-06-14 VITALS
HEIGHT: 69 IN | BODY MASS INDEX: 27.78 KG/M2 | HEART RATE: 71 BPM | WEIGHT: 187.6 LBS | DIASTOLIC BLOOD PRESSURE: 76 MMHG | SYSTOLIC BLOOD PRESSURE: 132 MMHG

## 2021-06-14 DIAGNOSIS — K22.2 STRICTURE AND STENOSIS OF ESOPHAGUS: Primary | ICD-10-CM

## 2021-06-14 DIAGNOSIS — K21.00 GASTROESOPHAGEAL REFLUX DISEASE WITH ESOPHAGITIS WITHOUT HEMORRHAGE: ICD-10-CM

## 2021-06-14 PROCEDURE — 99213 OFFICE O/P EST LOW 20 MIN: CPT | Performed by: NURSE PRACTITIONER

## 2021-06-14 NOTE — PATIENT INSTRUCTIONS
Food Choices for Gastroesophageal Reflux Disease, Adult  When you have gastroesophageal reflux disease (GERD), the foods you eat and your eating habits are very important. Choosing the right foods can help ease your discomfort. Think about working with a food expert (dietitian) to help you make good choices.  What are tips for following this plan?  Reading food labels  · Read the label for foods that are low in saturated fat. Foods that may help with your symptoms include:  ? Foods that have less than 5% of daily value (DV) of fat.  ? Foods that have 0 grams of trans fat.  Cooking  · Do not peñaloza your food. Cook your food by baking, steaming, grilling, or broiling. These are all methods that do not need a lot of fat for cooking.  · To add flavor, try to use herbs that are low in spice and acidity.  Meal planning    · Choose healthy foods that are low in fat, such as:  ? Fruits and vegetables.  ? Whole grains.  ? Low-fat dairy products.  ? Lean meats, fish, and poultry.  · Eat small meals often instead of eating 3 large meals each day. Eat your meals slowly in a place where you are relaxed. Avoid bending over or lying down until 2-3 hours after eating.  · Limit high-fat foods such as fatty meats or fried foods.  · Limit your intake of oils, butter, and shortening to less than 8 teaspoons each day.  · Avoid the following:  ? Foods that cause symptoms. These may be different for different people. Keep a food diary to keep track of foods that cause symptoms.  ? Alcohol.  ? Drinking a lot of liquid with meals.  ? Eating meals during the 2-3 hours before bed.  Lifestyle  · Stay at a healthy weight. Ask your doctor what weight is healthy for you. If you need to lose weight, work with your doctor to do so safely.  · Exercise for at least 30 minutes on 5 or more days each week, or as told by your doctor.  · Wear loose-fitting clothes.  · Do not use any products that contain nicotine or tobacco, such as cigarettes,  e-cigarettes, and chewing tobacco. If you need help quitting, ask your doctor.  · Sleep with the head of your bed higher than your feet. Use a wedge under the mattress or blocks under the bed frame to raise the head of the bed.  What foods should eat?    Eat a healthy, well-balanced diet of fruits, vegetables, whole grains, low-fat dairy products, lean meats, fish, and poultry. Each person is different. Foods that may cause symptoms in one person may not cause any symptoms in another person. Work with your doctor to find foods that are safe for you.  The items listed above may not be a complete list of foods and beverages you can eat. Contact a dietitian for more information.  What foods should I avoid?  Limiting some of these foods may help in managing the symptoms of GERD. Everyone is different. Talk with a food expert or your doctor to help you find the exact foods to avoid, if any.  Fruits  Any fruits prepared with added fat. Any fruits that cause symptoms. For some people, this may include citrus fruits, such as oranges, grapefruit, pineapple, and zabrina.  Vegetables  Deep-fried vegetables. French fries. Any vegetables prepared with added fat. Any vegetables that cause symptoms. For some people, this may include tomatoes and tomato products, chili peppers, onions and garlic, and horseradish.  Grains  Pastries or quick breads with added fat.  Meats and other proteins  High-fat meats, such as fatty beef or pork, hot dogs, ribs, ham, sausage, salami, and beal. Fried meat or protein, including fried fish and fried chicken. Nuts and nut butters.  Dairy  Whole milk and chocolate milk. Sour cream. Cream. Ice cream. Cream cheese. Milkshakes.  Fats and oils  Butter. Margarine. Shortening. Ghee.  Beverages  Coffee and tea, with or without caffeine. Carbonated beverages. Sodas. Energy drinks. Fruit juice made with acidic fruits (such as orange or grapefruit). Tomato juice. Alcoholic drinks.  Sweets and  desserts  Chocolate and cocoa. Donuts.  Seasonings and condiments  Pepper. Peppermint and spearmint. Added salt. Any condiments, herbs, or seasonings that cause symptoms. For some people, this may include thomson, hot sauce, or vinegar-based salad dressings.  The items listed above may not be a complete list of foods and beverages you should avoid. Contact a dietitian for more information.  Questions to ask your doctor  Diet and lifestyle changes are often the first steps that are taken to manage symptoms of GERD. If diet and lifestyle changes do not help, talk with your doctor about taking medicines.  Where to find more information  · [employer]: aboutgerd.org  Summary  · When you have GERD, food and lifestyle choices are very important in easing your symptoms.  · Eat small meals often instead of 3 large meals a day. Eat your meals slowly and in a place where you are relaxed.  · Avoid bending over or lying down until 2-3 hours after eating.  · Limit high-fat foods such as fatty meat or fried foods.  This information is not intended to replace advice given to you by your health care provider. Make sure you discuss any questions you have with your health care provider.  Document Revised: 10/12/2020 Document Reviewed: 10/12/2020  Elsevier Patient Education © 2021 [employer]

## 2021-06-14 NOTE — PROGRESS NOTES
Chief Complaint   Patient presents with   • Difficulty Swallowing   • Heartburn       Subjective    Sharan Steiner is a 80 y.o. male. he is here today for follow-up.    History of Present Illness  80-year-old male presents with daughter to discuss EGD.  Reports he has done much better with swallowing since procedure but has choked on bread he occasionally wakes up at night with phlegm and cough but overall has done much better.  He denies any nausea vomiting or abdominal pain.  Denies any change in bowel movements or blood within his stool.  EGD was completed 5/26/2021 noted benign-appearing esophageal stenosis which was dilated 54 Trin[place] gastritis no specimens were collected.       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   • Seizures (CMS/HCC)      Past Surgical History:   Procedure Laterality Date   • BACK SURGERY     • ENDOSCOPY N/A 7/17/2019    Procedure: ESOPHAGOGASTRODUODENOSCOPY possible dilation;  Surgeon: Jeff Crabtree MD;  Location: [facility] ENDOSCOPY;  Service: Gastroenterology   • ENDOSCOPY N/A 12/9/2019    Procedure: ESOPHAGOGASTRODUODENOSCOPY possible dilation;  Surgeon: Jeff Crabtree MD;  Location: [facility] ENDOSCOPY;  Service: Gastroenterology   • ENDOSCOPY N/A 2/10/2021    Procedure: ESOPHAGOGASTRODUODENOSCOPY possible dilation Monday appt;  Surgeon: Jeff Crabtree MD;  Location: [facility] ENDOSCOPY;  Service: Gastroenterology;  Laterality: N/A;  savory dilation 48-54 fr   • ENDOSCOPY N/A 5/26/2021    Procedure: ESOPHAGOGASTRODUODENOSCOPY possible dilation;  Surgeon: Jeff Crabtree MD;  Location: [facility] ENDOSCOPY;  Service: Gastroenterology;  Laterality: N/A;   • ORCHIECTOMY     •  UPPER GASTROINTESTINAL ENDOSCOPY  02/10/2021     Family History   Problem Relation Age of Onset   • Diabetes Mother    • Hypertension Sister    • Cancer Sister    • Hypertension Brother    • Cancer Brother        Prior to Admission medications    Medication Sig Start Date End Date Taking? Authorizing Provider   aspirin 325 MG EC tablet Take 325 mg by mouth Daily.   Yes Monique Melendez MD   atenolol (TENORMIN) 50 MG tablet Take 50 mg by mouth Daily. 12/13/16  Yes Monique Melendez MD   doxazosin (CARDURA) 4 MG tablet Take 4 mg by mouth Daily.   Yes Monique Melendez MD   esomeprazole (nexIUM) 40 MG capsule Take 1 capsule by mouth Daily. 2/16/21  Yes Nury Ortega APRN   fexofenadine (ALLEGRA) 180 MG tablet Take 180 mg by mouth Daily.   Yes Monique Melendez MD   fluconazole (Diflucan) 100 MG tablet Take 2 tablets on day one then 1 tablet daily 2/16/21  Yes Nury Ortega APRN   fluticasone (FLONASE) 50 MCG/ACT nasal spray 2 sprays into each nostril 2 (Two) Times a Day. Administer 2 sprays in each nostril for each dose.  3/2/16  Yes Monique Melendez MD   Fluticasone Furoate-Vilanterol (BREO ELLIPTA IN) Inhale 1 puff Daily.   Yes Monique Melendez MD   levETIRAcetam (KEPPRA) 500 MG tablet Take 1 tablet by mouth 2 (Two) Times a Day. 2/2/17  Yes Domingo Acosta MD   losartan (COZAAR) 100 MG tablet Take 100 mg by mouth Daily. 1/5/21  Yes Monique Melendez MD   VENTOLIN  (90 Base) MCG/ACT inhaler Inhale 2 puffs Every 6 (Six) Hours As Needed. 6/26/19  Yes Monique Melendez MD     Allergies   Allergen Reactions   • Levofloxacin Other (See Comments)     Seizures   • Penicillins Anaphylaxis   • Crestor [Rosuvastatin Calcium] Other (See Comments)     Rhabdo   • Tramadol Other (See Comments)     Seizures   • Sulfa Antibiotics Rash     Social History     Socioeconomic History   • Marital status:      Spouse name: Not on file   • Number of children: Not on file   • Years of  "education: Not on file   • Highest education level: Not on file   Tobacco Use   • Smoking status: Former Smoker     Quit date: 1980     Years since quittin.4   • Smokeless tobacco: Never Used   Vaping Use   • Vaping Use: Never used   Substance and Sexual Activity   • Alcohol use: No   • Drug use: No   • Sexual activity: Never       Review of Systems  Review of Systems   Constitutional: Negative for activity change, appetite change, chills, diaphoresis, fatigue, fever and unexpected weight change.   HENT: Positive for trouble swallowing (some with bread overall better).    Respiratory: Negative for cough and shortness of breath.    Gastrointestinal: Negative for abdominal distention, abdominal pain, anal bleeding, blood in stool, constipation, diarrhea, nausea, rectal pain and vomiting.        /76 (BP Location: Left arm)   Pulse 71   Ht 175.3 cm (69\")   Wt 85.1 kg (187 lb 9.6 oz)   BMI 27.70 kg/m²     Objective    Physical Exam  Constitutional:       General: He is not in acute distress.     Appearance: Normal appearance. He is well-developed.   HENT:      Head: Normocephalic and atraumatic.   Neck:      Thyroid: No thyromegaly.   Pulmonary:      Effort: Pulmonary effort is normal.   Abdominal:      General: Bowel sounds are normal. There is no distension.      Palpations: Abdomen is soft. Abdomen is not rigid.      Tenderness: There is no abdominal tenderness. There is no guarding.      Hernia: No hernia is present.   Musculoskeletal:      Cervical back: Normal range of motion and neck supple.   Skin:     General: Skin is warm and dry.      Coloration: Skin is not pale.      Findings: No rash.   Neurological:      Mental Status: He is alert and oriented to person, place, and time.   Psychiatric:         Speech: Speech normal.         Behavior: Behavior is cooperative.       Lab on 2021   Component Date Value Ref Range Status   • COVID19 2021 Not Detected  Not Detected - Ref. Range Final "     Assessment/Plan      1. Stricture and stenosis of esophagus    2. Gastroesophageal reflux disease with esophagitis without hemorrhage    .   Discussed importance of safe swallowing measures recommend he continue Nexium daily and avoid gastric irritants.  Follow-up as needed     Orders placed during this encounter include:  No orders of the defined types were placed in this encounter.      * Surgery not found *    Review and/or summary of lab tests, radiology, procedures, medications. Review and summary of old records and obtaining of history. The risks and benefits of my recommendations, as well as other treatment options were discussed with the patient today. Questions were answered.    No orders of the defined types were placed in this encounter.      Follow-up: Return if symptoms worsen or fail to improve, for Recheck, After test.          This document has been electronically signed by OSIE Harper on June 14, 2021 14:34 CDT           I spent 16 minutes caring for Sharan on this date of service. This time includes time spent by me in the following activities:preparing for the visit, reviewing tests, obtaining and/or reviewing a separately obtained history, performing a medically appropriate examination and/or evaluation , counseling and educating the patient/family/caregiver, ordering medications, tests, or procedures, referring and communicating with other health care professionals , documenting information in the medical record and care coordination    Results for orders placed or performed in visit on 05/25/21   COVID-19, [facility] IN-HOUSE, NP SWAB IN TRANSPORT MEDIA 8-10 HR TAT - Swab, Nasopharynx    Specimen: Nasopharynx; Swab   Result Value Ref Range    COVID19 Not Detected Not Detected - Ref. Range   Results for orders placed or performed in visit on 02/07/21   COVID-19,[facility]SE, NP/OP SWAB IN UTM/VTM/SALINE TRANSPORT MEDIA,24 HR TAT - Swab, Nasopharynx    Specimen: Nasopharynx;  Swab   Result Value Ref Range    COVID19 Not Detected Not Detected - Ref. Range   Results for orders placed or performed during the hospital encounter of 07/17/19   Tissue Pathology Exam    Specimen: A: Gastric, Antrum; Tissue    B: Esophagus, Distal; Tissue   Result Value Ref Range    Case Report       Surgical Pathology Report                         Case: [id number]                                  Authorizing Provider:  Jeff Crabtree MD        Collected:           07/17/2019 05:30 PM          Ordering Location:     [facility]             Received:            07/18/2019 06:14 AM                                 [facility]                                                     Pathologist:           Domenic Espino MD                                                        Specimens:   1) - Gastric, Antrum, antrum  cold bx                                                               2) - Esophagus, Distal, distal esophagus   cold bx                                         Final Diagnosis       1.  GASTRIC ANTRUM, MUCOSAL BIOPSY:  MILD CHRONIC GASTRITIS.  NO EVIDENCE OF HELICOBACTER PYLORI.    2.  DISTAL ESOPHAGUS, MUCOSAL BIOPSY:  SEGMENTS OF MILDLY INFLAMED STRATIFIED SQUAMOUS EPITHELIUM.      Gross Description       In 2 containers, each of these show mucosal biopsies measuring up to 0.3 cm in greatest dimension.  Embedded accordingly.  1A antrum; 2A distal esophagus.     Results for orders placed or performed in visit on 03/13/18   Tissue Pathology Exam    Specimen: Clavicle, Right; Hardware / Foreign Body   Result Value Ref Range    Case Report       Surgical Pathology Report                         Case: [id number]                                  Authorizing Provider:  Elroy Lucas MD Collected:           03/13/2018 03:35 PM          Ordering Location:     [facility]     Received:            03/14/2018 07:48 AM                                 [facility] GENERAL SURGERY                                                         Pathologist:           Domenic Espino MD                                                         Specimen:    Clavicle, Right, Mediport                                                                  Clinical Information       Removal of Mediport right clavicular area.      Final Diagnosis       OLD MEDIPORT.      Gross Description       The specimen consists of a Mediport with a 25.0 cm rubber lead.      Embedded Images     Results for orders placed or performed in visit on 03/09/18   CBC Auto Differential    Specimen: Blood   Result Value Ref Range    WBC 6.86 3.20 - 9.80 10*3/mm3    RBC 4.28 (L) 4.37 - 5.74 10*6/mm3    Hemoglobin 12.2 (L) 13.7 - 17.3 g/dL    Hematocrit 35.7 (L) 39.0 - 49.0 %    MCV 83.4 80.0 - 98.0 fL    MCH 28.5 26.5 - 34.0 pg    MCHC 34.2 31.5 - 36.3 g/dL    RDW 12.4 11.5 - 14.5 %    RDW-SD 37.6 35.1 - 43.9 fl    MPV 9.8 8.0 - 12.0 fL    Platelets 185 150 - 450 10*3/mm3    Neutrophil % 69.1 37.0 - 80.0 %    Lymphocyte % 10.6 10.0 - 50.0 %    Monocyte % 12.7 (H) 0.0 - 12.0 %    Eosinophil % 6.6 0.0 - 7.0 %    Basophil % 0.6 0.0 - 2.0 %    Immature Grans % 0.4 0.0 - 0.5 %    Neutrophils, Absolute 4.74 2.00 - 8.60 10*3/mm3    Lymphocytes, Absolute 0.73 0.60 - 4.20 10*3/mm3    Monocytes, Absolute 0.87 0.00 - 0.90 10*3/mm3    Eosinophils, Absolute 0.45 0.00 - 0.70 10*3/mm3    Basophils, Absolute 0.04 0.00 - 0.20 10*3/mm3    Immature Grans, Absolute 0.03 (H) 0.00 - 0.02 10*3/mm3   Lactate Dehydrogenase    Specimen: Blood   Result Value Ref Range     313 - 618 U/L   Comprehensive Metabolic Panel    Specimen: Blood   Result Value Ref Range    Glucose 94 60 - 100 mg/dL    BUN 11 7 - 21 mg/dL    Creatinine 0.74 0.70 - 1.30 mg/dL    Sodium 137 137 - 145 mmol/L    Potassium 4.5 3.5 - 5.1 mmol/L    Chloride 97 95 - 110 mmol/L    CO2 27.0 22.0 - 31.0 mmol/L    Calcium 9.0 8.4 - 10.2 mg/dL    Total Protein 6.6 6.3 - 8.6 g/dL    Albumin 3.90 3.40  - 4.80 g/dL    ALT (SGPT) 29 21 - 72 U/L    AST (SGOT) 26 17 - 59 U/L    Alkaline Phosphatase 110 38 - 126 U/L    Total Bilirubin 0.4 0.2 - 1.3 mg/dL    eGFR Non  Amer 102 (H) 42 - 98 mL/min/1.73    Globulin 2.7 2.3 - 3.5 gm/dL    A/G Ratio 1.4 1.1 - 1.8 g/dL    BUN/Creatinine Ratio 14.9 7.0 - 25.0    Anion Gap 13.0 5.0 - 15.0 mmol/L   Results for orders placed or performed in visit on 12/08/17   Iron and TIBC    Specimen: Blood   Result Value Ref Range    Iron 82 49 - 181 mcg/dL    TIBC 378 261 - 462 mcg/dL    Iron Saturation 22 20 - 55 %   Folate    Specimen: Blood   Result Value Ref Range    Folate >20.00 2.76 - 21.00 ng/mL   Ferritin    Specimen: Blood   Result Value Ref Range    Ferritin 55.30 17.90 - 464.00 ng/mL   Vitamin B12    Specimen: Blood   Result Value Ref Range    Vitamin B-12 298 239 - 931 pg/mL   Results for orders placed or performed in visit on 12/08/17   CBC Auto Differential    Specimen: Blood   Result Value Ref Range    WBC 5.61 3.20 - 9.80 10*3/mm3    RBC 4.14 (L) 4.37 - 5.74 10*6/mm3    Hemoglobin 12.1 (L) 13.7 - 17.3 g/dL    Hematocrit 34.5 (L) 39.0 - 49.0 %    MCV 83.3 80.0 - 98.0 fL    MCH 29.2 26.5 - 34.0 pg    MCHC 35.1 31.5 - 36.3 g/dL    RDW 12.7 11.5 - 14.5 %    RDW-SD 38.5 35.1 - 43.9 fl    MPV 9.6 8.0 - 12.0 fL    Platelets 172 150 - 450 10*3/mm3    Neutrophil % 66.4 37.0 - 80.0 %    Lymphocyte % 18.5 10.0 - 50.0 %    Monocyte % 9.6 0.0 - 12.0 %    Eosinophil % 4.8 0.0 - 7.0 %    Basophil % 0.5 0.0 - 2.0 %    Immature Grans % 0.2 0.0 - 0.5 %    Neutrophils, Absolute 3.72 2.00 - 8.60 10*3/mm3    Lymphocytes, Absolute 1.04 0.60 - 4.20 10*3/mm3    Monocytes, Absolute 0.54 0.00 - 0.90 10*3/mm3    Eosinophils, Absolute 0.27 0.00 - 0.70 10*3/mm3    Basophils, Absolute 0.03 0.00 - 0.20 10*3/mm3    Immature Grans, Absolute 0.01 0.00 - 0.02 10*3/mm3   Lactate Dehydrogenase    Specimen: Blood   Result Value Ref Range     313 - 618 U/L   Comprehensive Metabolic Panel    Specimen:  Blood   Result Value Ref Range    Glucose 87 60 - 100 mg/dL    BUN 11 7 - 21 mg/dL    Creatinine 0.85 0.70 - 1.30 mg/dL    Sodium 137 137 - 145 mmol/L    Potassium 4.3 3.5 - 5.1 mmol/L    Chloride 101 95 - 110 mmol/L    CO2 28.0 22.0 - 31.0 mmol/L    Calcium 9.4 8.4 - 10.2 mg/dL    Total Protein 6.7 6.3 - 8.6 g/dL    Albumin 4.00 3.40 - 4.80 g/dL    ALT (SGPT) 32 21 - 72 U/L    AST (SGOT) 33 17 - 59 U/L    Alkaline Phosphatase 101 38 - 126 U/L    Total Bilirubin 0.5 0.2 - 1.3 mg/dL    eGFR Non African Amer 87 42 - 98 mL/min/1.73    Globulin 2.7 2.3 - 3.5 gm/dL    A/G Ratio 1.5 1.1 - 1.8 g/dL    BUN/Creatinine Ratio 12.9 7.0 - 25.0    Anion Gap 8.0 5.0 - 15.0 mmol/L     *Note: Due to a large number of results and/or encounters for the requested time period, some results have not been displayed. A complete set of results can be found in Results Review.

## 2021-06-17 RX ORDER — ESOMEPRAZOLE MAGNESIUM 40 MG/1
CAPSULE, DELAYED RELEASE ORAL
Qty: 30 CAPSULE | Refills: 11 | Status: SHIPPED | OUTPATIENT
Start: 2021-06-17 | End: 2021-07-15 | Stop reason: SDUPTHER

## 2021-07-15 RX ORDER — ESOMEPRAZOLE MAGNESIUM 40 MG/1
40 CAPSULE, DELAYED RELEASE ORAL DAILY
Qty: 30 CAPSULE | Refills: 11 | Status: SHIPPED | OUTPATIENT
Start: 2021-07-15 | End: 2022-06-20

## 2021-08-26 ENCOUNTER — OFFICE VISIT (OUTPATIENT)
Dept: GASTROENTEROLOGY | Facility: CLINIC | Age: 81
End: 2021-08-26

## 2021-08-26 VITALS
SYSTOLIC BLOOD PRESSURE: 120 MMHG | DIASTOLIC BLOOD PRESSURE: 70 MMHG | HEART RATE: 63 BPM | WEIGHT: 184.2 LBS | HEIGHT: 69 IN | BODY MASS INDEX: 27.28 KG/M2

## 2021-08-26 DIAGNOSIS — K21.00 GASTROESOPHAGEAL REFLUX DISEASE WITH ESOPHAGITIS WITHOUT HEMORRHAGE: ICD-10-CM

## 2021-08-26 DIAGNOSIS — R13.19 ESOPHAGEAL DYSPHAGIA: ICD-10-CM

## 2021-08-26 DIAGNOSIS — Z87.19 HISTORY OF ESOPHAGEAL STRICTURE: Primary | ICD-10-CM

## 2021-08-26 PROCEDURE — 99213 OFFICE O/P EST LOW 20 MIN: CPT | Performed by: NURSE PRACTITIONER

## 2021-08-26 RX ORDER — DEXTROSE AND SODIUM CHLORIDE 5; .45 G/100ML; G/100ML
30 INJECTION, SOLUTION INTRAVENOUS CONTINUOUS PRN
Status: CANCELLED | OUTPATIENT
Start: 2021-09-29

## 2021-08-26 NOTE — PROGRESS NOTES
Chief Complaint   Patient presents with   • Difficulty Swallowing   • Heartburn     ENDO: EGD with Dilation- recurrent esophageal stricture(dysphagia)     Subjective    Sharan Steiner is a 81 y.o. male. he is here today for follow-up.    81-year-old male presents to discuss recurrent dysphagia.  States dilation may seem to really help symptoms but he has recently started choking a lot more not eating as much due to choking episodes that occur with most any intake.  He has had more coughing and phlegm that wake him up at night overall reflux does seem to be better with Nexium.    Difficulty Swallowing  This is a recurrent problem. The current episode started 1 to 4 weeks ago (about 4 weeks ago ). The problem occurs intermittently. The problem has been waxing and waning. Associated symptoms include anorexia (decreased intake due to choking with eating then loses appetite ), coughing (excessive phlegm ) and fatigue. Pertinent negatives include no abdominal pain, arthralgias, change in bowel habit, chest pain, chills, congestion, diaphoresis, fever, headaches, joint swelling, myalgias, nausea, neck pain or numbness.   Heartburn  He complains of belching, coughing (excessive phlegm ), early satiety and globus sensation. He reports no abdominal pain, no chest pain, no choking, no dysphagia, no heartburn, no hoarse voice or no nausea. Associated symptoms include fatigue.     Plan; schedule patient for EGD due to concern for possible recurrent esophageal stricture.  Discussed importance of safe swallowing measures.       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 ENDOSCOPY;  Service: Gastroenterology   • ENDOSCOPY N/A 12/9/2019    Procedure: ESOPHAGOGASTRODUODENOSCOPY possible dilation;  Surgeon: Jeff Crabtree MD;  Location: [facility] ENDOSCOPY;  Service: Gastroenterology   • ENDOSCOPY N/A 2/10/2021    Procedure: ESOPHAGOGASTRODUODENOSCOPY possible dilation Monday appt;  Surgeon: Jeff Crabtree MD;  Location: [facility] ENDOSCOPY;  Service: Gastroenterology;  Laterality: N/A;  savory dilation 48-54 fr   • ENDOSCOPY N/A 5/26/2021    Procedure: ESOPHAGOGASTRODUODENOSCOPY possible dilation;  Surgeon: Jeff Crabtree MD;  Location: [facility] ENDOSCOPY;  Service: Gastroenterology;  Laterality: N/A;   • ORCHIECTOMY     • UPPER GASTROINTESTINAL ENDOSCOPY  02/10/2021     Family History   Problem Relation Age of Onset   • Diabetes Mother    • Hypertension Sister    • Cancer Sister    • Hypertension Brother    • Cancer Brother        Prior to Admission medications    Medication Sig Start Date End Date Taking? Authorizing Provider   aspirin 325 MG EC tablet Take 325 mg by mouth Daily.   Yes Monique Melendez MD   atenolol (TENORMIN) 50 MG tablet Take 50 mg by mouth Daily. 12/13/16  Yes Monique Melendez MD   doxazosin (CARDURA) 4 MG tablet Take 4 mg by mouth Daily.   Yes Monique Melendez MD   esomeprazole (nexIUM) 40 MG capsule Take 1 capsule by mouth Daily. 7/15/21  Yes Nury Ortega APRN   fexofenadine (ALLEGRA) 180 MG tablet Take 180 mg by mouth Daily.   Yes Monique Melendez MD   fluconazole (Diflucan) 100 MG tablet Take 2 tablets on day one then 1 tablet daily 2/16/21  Yes Nury Ortega APRN   fluticasone (FLONASE) 50 MCG/ACT nasal spray 2 sprays into each nostril 2 (Two) Times a Day. Administer 2 sprays in each nostril for each dose.  3/2/16  Yes Monique Melendez MD   Fluticasone  "Furoate-Vilanterol (BREO ELLIPTA IN) Inhale 1 puff Daily.   Yes ProviderMonique MD   levETIRAcetam (KEPPRA) 500 MG tablet Take 1 tablet by mouth 2 (Two) Times a Day. 17  Yes Domingo Acosta MD   losartan (COZAAR) 100 MG tablet Take 100 mg by mouth Daily. 21  Yes Monique Melendez MD   VENTOLIN  (90 Base) MCG/ACT inhaler Inhale 2 puffs Every 6 (Six) Hours As Needed. 19  Yes Monique Melendez MD     Allergies   Allergen Reactions   • Levofloxacin Other (See Comments)     Seizures   • Penicillins Anaphylaxis   • Crestor [Rosuvastatin Calcium] Other (See Comments)     Rhabdo   • Tramadol Other (See Comments)     Seizures   • Sulfa Antibiotics Rash     Social History     Socioeconomic History   • Marital status:      Spouse name: Not on file   • Number of children: Not on file   • Years of education: Not on file   • Highest education level: Not on file   Tobacco Use   • Smoking status: Former Smoker     Quit date:      Years since quittin.6   • Smokeless tobacco: Never Used   Vaping Use   • Vaping Use: Never used   Substance and Sexual Activity   • Alcohol use: No   • Drug use: No   • Sexual activity: Never       Review of Systems  Review of Systems   Constitutional: Positive for fatigue. Negative for chills, diaphoresis and fever.   HENT: Negative for congestion and hoarse voice.    Respiratory: Positive for cough (excessive phlegm ). Negative for choking.    Cardiovascular: Negative for chest pain.   Gastrointestinal: Positive for anorexia (decreased intake due to choking with eating then loses appetite ). Negative for abdominal pain, change in bowel habit, dysphagia, heartburn and nausea.   Musculoskeletal: Negative for arthralgias, joint swelling, myalgias and neck pain.   Neurological: Negative for numbness and headaches.        /70 (BP Location: Left arm)   Pulse 63   Ht 175.3 cm (69\")   Wt 83.6 kg (184 lb 3.2 oz)   BMI 27.20 kg/m²     Objective  "   Physical Exam  Constitutional:       General: He is not in acute distress.     Appearance: Normal appearance. He is normal weight. He is not ill-appearing.   HENT:      Head: Normocephalic and atraumatic.   Pulmonary:      Effort: Pulmonary effort is normal.   Abdominal:      General: Abdomen is flat. Bowel sounds are normal. There is no distension.      Palpations: Abdomen is soft. There is no mass.      Tenderness: There is no abdominal tenderness.   Neurological:      Mental Status: He is alert.       Lab on 05/25/2021   Component Date Value Ref Range Status   • COVID19 05/25/2021 Not Detected  Not Detected - Ref. Range Final     Assessment/Plan      1. History of esophageal stricture    2. Gastroesophageal reflux disease with esophagitis without hemorrhage    3. Esophageal dysphagia    .       Orders placed during this encounter include:  Orders Placed This Encounter   Procedures   • Follow Anesthesia Guidelines / Standing Orders     Standing Status:   Future   • Obtain Informed Consent     Standing Status:   Future     Order Specific Question:   Informed Consent Given For     Answer:   ESOPHAGOGASTRODUODENOSCOPY possible dilation       ESOPHAGOGASTRODUODENOSCOPY possible dilation (N/A)    Review and/or summary of lab tests, radiology, procedures, medications. Review and summary of old records and obtaining of history. The risks and benefits of my recommendations, as well as other treatment options were discussed with the patient today. Questions were answered.    No orders of the defined types were placed in this encounter.      Follow-up: Return in about 4 weeks (around 9/23/2021) for Recheck, After test.          This document has been electronically signed by OSEI Harper on August 26, 2021 15:22 CDT           I spent 16 minutes caring for Sharan on this date of service. This time includes time spent by me in the following activities:preparing for the visit, reviewing tests, obtaining and/or  reviewing a separately obtained history, performing a medically appropriate examination and/or evaluation , counseling and educating the patient/family/caregiver, ordering medications, tests, or procedures, referring and communicating with other health care professionals , documenting information in the medical record and care coordination    Results for orders placed or performed in visit on 05/25/21   COVID-19, [facility] IN-HOUSE, NP SWAB IN TRANSPORT MEDIA 8-10 HR TAT - Swab, Nasopharynx    Specimen: Nasopharynx; Swab   Result Value Ref Range    COVID19 Not Detected Not Detected - Ref. Range   Results for orders placed or performed in visit on 02/07/21   COVID-19,[facility] IN-HOUSE, NP/OP SWAB IN UTM/VTM/SALINE TRANSPORT MEDIA,24 HR TAT - Swab, Nasopharynx    Specimen: Nasopharynx; Swab   Result Value Ref Range    COVID19 Not Detected Not Detected - Ref. Range   Results for orders placed or performed during the hospital encounter of 07/17/19   Tissue Pathology Exam    Specimen: A: Gastric, Antrum; Tissue    B: Esophagus, Distal; Tissue   Result Value Ref Range    Case Report       Surgical Pathology Report                         Case: [id number]                                  Authorizing Provider:  Jeff Crabtree MD        Collected:           07/17/2019 05:30 PM          Ordering Location:     [facility]             Received:            07/18/2019 06:14 AM                                 [place] [facility]                                                     Pathologist:           Domenic Espino MD                                                        Specimens:   1) - Gastric, Antrum, antrum  cold bx                                                               2) - Esophagus, Distal, distal esophagus   cold bx                                         Final Diagnosis       1.  GASTRIC ANTRUM, MUCOSAL BIOPSY:  MILD CHRONIC GASTRITIS.  NO EVIDENCE OF HELICOBACTER PYLORI.    2.  DISTAL  ESOPHAGUS, MUCOSAL BIOPSY:  SEGMENTS OF MILDLY INFLAMED STRATIFIED SQUAMOUS EPITHELIUM.      Gross Description       In 2 containers, each of these show mucosal biopsies measuring up to 0.3 cm in greatest dimension.  Embedded accordingly.  1A antrum; 2A distal esophagus.     Results for orders placed or performed in visit on 03/13/18   Tissue Pathology Exam    Specimen: Clavicle, Right; Hardware / Foreign Body   Result Value Ref Range    Case Report       Surgical Pathology Report                         Case: [id number]                                  Authorizing Provider:  Elroy Lucas MD Collected:           03/13/2018 03:35 PM          Ordering Location:     [facility]     Received:            03/14/2018 07:48 AM                                 [facility]                                                        Pathologist:           Domenic Espino MD                                                         Specimen:    Clavicle, Right, Mediport                                                                  Clinical Information       Removal of Mediport right clavicular area.      Final Diagnosis       OLD MEDIPORT.      Gross Description       The specimen consists of a Mediport with a 25.0 cm rubber lead.      Embedded Images     Results for orders placed or performed in visit on 03/09/18   CBC Auto Differential    Specimen: Blood   Result Value Ref Range    WBC 6.86 3.20 - 9.80 10*3/mm3    RBC 4.28 (L) 4.37 - 5.74 10*6/mm3    Hemoglobin 12.2 (L) 13.7 - 17.3 g/dL    Hematocrit 35.7 (L) 39.0 - 49.0 %    MCV 83.4 80.0 - 98.0 fL    MCH 28.5 26.5 - 34.0 pg    MCHC 34.2 31.5 - 36.3 g/dL    RDW 12.4 11.5 - 14.5 %    RDW-SD 37.6 35.1 - 43.9 fl    MPV 9.8 8.0 - 12.0 fL    Platelets 185 150 - 450 10*3/mm3    Neutrophil % 69.1 37.0 - 80.0 %    Lymphocyte % 10.6 10.0 - 50.0 %    Monocyte % 12.7 (H) 0.0 - 12.0 %    Eosinophil % 6.6 0.0 - 7.0 %    Basophil % 0.6 0.0 - 2.0 %    Immature  Grans % 0.4 0.0 - 0.5 %    Neutrophils, Absolute 4.74 2.00 - 8.60 10*3/mm3    Lymphocytes, Absolute 0.73 0.60 - 4.20 10*3/mm3    Monocytes, Absolute 0.87 0.00 - 0.90 10*3/mm3    Eosinophils, Absolute 0.45 0.00 - 0.70 10*3/mm3    Basophils, Absolute 0.04 0.00 - 0.20 10*3/mm3    Immature Grans, Absolute 0.03 (H) 0.00 - 0.02 10*3/mm3   Lactate Dehydrogenase    Specimen: Blood   Result Value Ref Range     313 - 618 U/L   Comprehensive Metabolic Panel    Specimen: Blood   Result Value Ref Range    Glucose 94 60 - 100 mg/dL    BUN 11 7 - 21 mg/dL    Creatinine 0.74 0.70 - 1.30 mg/dL    Sodium 137 137 - 145 mmol/L    Potassium 4.5 3.5 - 5.1 mmol/L    Chloride 97 95 - 110 mmol/L    CO2 27.0 22.0 - 31.0 mmol/L    Calcium 9.0 8.4 - 10.2 mg/dL    Total Protein 6.6 6.3 - 8.6 g/dL    Albumin 3.90 3.40 - 4.80 g/dL    ALT (SGPT) 29 21 - 72 U/L    AST (SGOT) 26 17 - 59 U/L    Alkaline Phosphatase 110 38 - 126 U/L    Total Bilirubin 0.4 0.2 - 1.3 mg/dL    eGFR Non  Amer 102 (H) 42 - 98 mL/min/1.73    Globulin 2.7 2.3 - 3.5 gm/dL    A/G Ratio 1.4 1.1 - 1.8 g/dL    BUN/Creatinine Ratio 14.9 7.0 - 25.0    Anion Gap 13.0 5.0 - 15.0 mmol/L   Results for orders placed or performed in visit on 12/08/17   Iron and TIBC    Specimen: Blood   Result Value Ref Range    Iron 82 49 - 181 mcg/dL    TIBC 378 261 - 462 mcg/dL    Iron Saturation 22 20 - 55 %   Folate    Specimen: Blood   Result Value Ref Range    Folate >20.00 2.76 - 21.00 ng/mL   Ferritin    Specimen: Blood   Result Value Ref Range    Ferritin 55.30 17.90 - 464.00 ng/mL   Vitamin B12    Specimen: Blood   Result Value Ref Range    Vitamin B-12 298 239 - 931 pg/mL   Results for orders placed or performed in visit on 12/08/17   CBC Auto Differential    Specimen: Blood   Result Value Ref Range    WBC 5.61 3.20 - 9.80 10*3/mm3    RBC 4.14 (L) 4.37 - 5.74 10*6/mm3    Hemoglobin 12.1 (L) 13.7 - 17.3 g/dL    Hematocrit 34.5 (L) 39.0 - 49.0 %    MCV 83.3 80.0 - 98.0 fL    MCH  29.2 26.5 - 34.0 pg    MCHC 35.1 31.5 - 36.3 g/dL    RDW 12.7 11.5 - 14.5 %    RDW-SD 38.5 35.1 - 43.9 fl    MPV 9.6 8.0 - 12.0 fL    Platelets 172 150 - 450 10*3/mm3    Neutrophil % 66.4 37.0 - 80.0 %    Lymphocyte % 18.5 10.0 - 50.0 %    Monocyte % 9.6 0.0 - 12.0 %    Eosinophil % 4.8 0.0 - 7.0 %    Basophil % 0.5 0.0 - 2.0 %    Immature Grans % 0.2 0.0 - 0.5 %    Neutrophils, Absolute 3.72 2.00 - 8.60 10*3/mm3    Lymphocytes, Absolute 1.04 0.60 - 4.20 10*3/mm3    Monocytes, Absolute 0.54 0.00 - 0.90 10*3/mm3    Eosinophils, Absolute 0.27 0.00 - 0.70 10*3/mm3    Basophils, Absolute 0.03 0.00 - 0.20 10*3/mm3    Immature Grans, Absolute 0.01 0.00 - 0.02 10*3/mm3   Lactate Dehydrogenase    Specimen: Blood   Result Value Ref Range     313 - 618 U/L   Comprehensive Metabolic Panel    Specimen: Blood   Result Value Ref Range    Glucose 87 60 - 100 mg/dL    BUN 11 7 - 21 mg/dL    Creatinine 0.85 0.70 - 1.30 mg/dL    Sodium 137 137 - 145 mmol/L    Potassium 4.3 3.5 - 5.1 mmol/L    Chloride 101 95 - 110 mmol/L    CO2 28.0 22.0 - 31.0 mmol/L    Calcium 9.4 8.4 - 10.2 mg/dL    Total Protein 6.7 6.3 - 8.6 g/dL    Albumin 4.00 3.40 - 4.80 g/dL    ALT (SGPT) 32 21 - 72 U/L    AST (SGOT) 33 17 - 59 U/L    Alkaline Phosphatase 101 38 - 126 U/L    Total Bilirubin 0.5 0.2 - 1.3 mg/dL    eGFR Non African Amer 87 42 - 98 mL/min/1.73    Globulin 2.7 2.3 - 3.5 gm/dL    A/G Ratio 1.5 1.1 - 1.8 g/dL    BUN/Creatinine Ratio 12.9 7.0 - 25.0    Anion Gap 8.0 5.0 - 15.0 mmol/L     *Note: Due to a large number of results and/or encounters for the requested time period, some results have not been displayed. A complete set of results can be found in Results Review.

## 2021-09-27 ENCOUNTER — LAB (OUTPATIENT)
Dept: LAB | Facility: HOSPITAL | Age: 81
End: 2021-09-27

## 2021-09-27 DIAGNOSIS — Z01.818 PREOP TESTING: Primary | ICD-10-CM

## 2021-09-27 LAB — SARS-COV-2 N GENE RESP QL NAA+PROBE: NOT DETECTED

## 2021-09-27 PROCEDURE — 87635 SARS-COV-2 COVID-19 AMP PRB: CPT

## 2021-09-27 PROCEDURE — C9803 HOPD COVID-19 SPEC COLLECT: HCPCS

## 2021-09-29 ENCOUNTER — ANESTHESIA EVENT (OUTPATIENT)
Dept: GASTROENTEROLOGY | Facility: HOSPITAL | Age: 81
End: 2021-09-29

## 2021-09-29 ENCOUNTER — ANESTHESIA (OUTPATIENT)
Dept: GASTROENTEROLOGY | Facility: HOSPITAL | Age: 81
End: 2021-09-29

## 2021-09-29 ENCOUNTER — HOSPITAL ENCOUNTER (OUTPATIENT)
Facility: HOSPITAL | Age: 81
Setting detail: HOSPITAL OUTPATIENT SURGERY
Discharge: HOME | End: 2021-09-29
Attending: INTERNAL MEDICINE | Admitting: INTERNAL MEDICINE

## 2021-09-29 VITALS
WEIGHT: 176 LBS | HEIGHT: 69 IN | HEART RATE: 81 BPM | BODY MASS INDEX: 26.07 KG/M2 | RESPIRATION RATE: 18 BRPM | SYSTOLIC BLOOD PRESSURE: 141 MMHG | DIASTOLIC BLOOD PRESSURE: 73 MMHG | OXYGEN SATURATION: 97 % | TEMPERATURE: 97.9 F

## 2021-09-29 DIAGNOSIS — Z87.19 HISTORY OF ESOPHAGEAL STRICTURE: ICD-10-CM

## 2021-09-29 DIAGNOSIS — R13.19 ESOPHAGEAL DYSPHAGIA: ICD-10-CM

## 2021-09-29 DIAGNOSIS — K21.00 GASTROESOPHAGEAL REFLUX DISEASE WITH ESOPHAGITIS WITHOUT HEMORRHAGE: ICD-10-CM

## 2021-09-29 PROCEDURE — 43248 EGD GUIDE WIRE INSERTION: CPT | Performed by: INTERNAL MEDICINE

## 2021-09-29 PROCEDURE — 25010000002 PROPOFOL 10 MG/ML EMULSION: Performed by: NURSE ANESTHETIST, CERTIFIED REGISTERED

## 2021-09-29 PROCEDURE — C1769 GUIDE WIRE: HCPCS | Performed by: INTERNAL MEDICINE

## 2021-09-29 RX ORDER — PROPOFOL 10 MG/ML
VIAL (ML) INTRAVENOUS AS NEEDED
Status: DISCONTINUED | OUTPATIENT
Start: 2021-09-29 | End: 2021-09-29 | Stop reason: SURG

## 2021-09-29 RX ORDER — DEXTROSE AND SODIUM CHLORIDE 5; .45 G/100ML; G/100ML
30 INJECTION, SOLUTION INTRAVENOUS CONTINUOUS PRN
Status: DISCONTINUED | OUTPATIENT
Start: 2021-09-29 | End: 2021-09-29 | Stop reason: HOSPADM

## 2021-09-29 RX ORDER — LIDOCAINE HYDROCHLORIDE 20 MG/ML
INJECTION, SOLUTION INTRAVENOUS AS NEEDED
Status: DISCONTINUED | OUTPATIENT
Start: 2021-09-29 | End: 2021-09-29 | Stop reason: SURG

## 2021-09-29 RX ADMIN — PROPOFOL 100 MG: 10 INJECTION, EMULSION INTRAVENOUS at 09:32

## 2021-09-29 RX ADMIN — LIDOCAINE HYDROCHLORIDE 50 MG: 20 INJECTION, SOLUTION INTRAVENOUS at 09:32

## 2021-09-29 RX ADMIN — DEXTROSE AND SODIUM CHLORIDE 30 ML/HR: 5; 450 INJECTION, SOLUTION INTRAVENOUS at 08:54

## 2021-09-29 NOTE — ANESTHESIA POSTPROCEDURE EVALUATION
Patient: Sharan Steiner    Procedure Summary     Date: 09/29/21 Room / Location: [facility] ENDOSCOPY 1 / [facility] ENDOSCOPY    Anesthesia Start: 0931 Anesthesia Stop: 0943    Procedure: ESOPHAGOGASTRODUODENOSCOPY possible dilation (N/A ) Diagnosis:       History of esophageal stricture      Gastroesophageal reflux disease with esophagitis without hemorrhage      Esophageal dysphagia      (History of esophageal stricture [Z87.19])      (Gastroesophageal reflux disease with esophagitis without hemorrhage [K21.00])      (Esophageal dysphagia [R13.10])    Surgeons: Jeff Crabtree MD Provider: Gregory Hess CRNA    Anesthesia Type: MAC ASA Status: 3          Anesthesia Type: MAC    Vitals  No vitals data found for the desired time range.          Post Anesthesia Care and Evaluation    Patient location during evaluation: bedside  Patient participation: complete - patient participated  Level of consciousness: sleepy but conscious  Pain score: 0  Pain management: adequate  Airway patency: patent  Anesthetic complications: No anesthetic complications  PONV Status: none  Cardiovascular status: acceptable  Respiratory status: acceptable  Hydration status: acceptable    Comments: ---------------------------               09/29/21 0944       ---------------------------   BP:          172/84         Pulse:         77           Resp:          18           Temp:   97.2 °F (36.2 °C)   SpO2:          91%         ---------------------------

## 2021-10-06 ENCOUNTER — OFFICE VISIT (OUTPATIENT)
Dept: GASTROENTEROLOGY | Facility: CLINIC | Age: 81
End: 2021-10-06

## 2021-10-06 VITALS
DIASTOLIC BLOOD PRESSURE: 77 MMHG | WEIGHT: 181 LBS | HEIGHT: 69 IN | HEART RATE: 63 BPM | SYSTOLIC BLOOD PRESSURE: 135 MMHG | BODY MASS INDEX: 26.81 KG/M2

## 2021-10-06 DIAGNOSIS — K22.2 STRICTURE AND STENOSIS OF ESOPHAGUS: Primary | ICD-10-CM

## 2021-10-06 DIAGNOSIS — K21.00 GASTROESOPHAGEAL REFLUX DISEASE WITH ESOPHAGITIS WITHOUT HEMORRHAGE: ICD-10-CM

## 2021-10-06 PROCEDURE — 99213 OFFICE O/P EST LOW 20 MIN: CPT | Performed by: NURSE PRACTITIONER

## 2021-10-06 RX ORDER — CETIRIZINE HYDROCHLORIDE 10 MG/1
10 TABLET ORAL DAILY
COMMUNITY

## 2021-10-06 RX ORDER — VALPROIC ACID 250 MG/1
250 CAPSULE, LIQUID FILLED ORAL
COMMUNITY
Start: 2021-09-09 | End: 2022-03-09

## 2021-10-06 NOTE — PATIENT INSTRUCTIONS
Esophageal Stricture    Esophageal stricture is a narrowing of the esophagus. The esophagus is the part of the body that moves food and liquid from your mouth to your stomach. The esophagus can become narrow because of disease or damage to the area. This condition can make swallowing difficult, painful, or even impossible. It also makes choking more likely.  What are the causes?  The most common cause of this condition is gastroesophageal reflux disease (GERD). Normally, food travels down the esophagus and stays in the stomach to be digested. In GERD, food and stomach acid move back up into the esophagus. Over time, this causes scar tissue and leads to narrowing.  Other causes of esophageal stricture include:  · Scarring from swallowing a harmful substance.  · Damage from medical instruments used in the esophagus.  · Radiation therapy.  · Cancer.  · Inflammation of the esophagus.  What increases the risk?  You are more likely to develop an esophageal stricture if you have GERD or esophageal cancer.  What are the signs or symptoms?  Symptoms of this condition include:  · Difficulty swallowing.  · Pain when swallowing.  · Burning pain or discomfort in the throat or chest (heartburn).  · Vomiting or spitting up food or liquids.  · Unexplained weight loss.  How is this diagnosed?  This condition may be diagnosed based on:  · Your symptoms and a physical exam.  · Tests, such as:  ? Upper endoscopy. Your health care provider will insert a flexible tube with a tiny camera on it (endoscope) into your esophagus to check for a stricture. A tissue sample may also be taken to be examined under a microscope (biopsy).  ? Esophageal pH monitoring. This test involves using a tube to collect acid in the esophagus to determine how much stomach acid is entering the esophagus.  ? Barium swallow test. For this test, you will drink a chalky liquid (barium solution) that coats the lining of the esophagus. Then you will have an X-ray  taken. The barium solution helps to show if there is a stricture.  How is this treated?  Treatment for esophageal stricture depends on what is causing your condition and how severe your condition is. Treatment options include:  · Esophageal dilation. In this procedure, a health care provider inserts an endoscope or a tool called a dilator into the esophagus to gently stretch it and make the opening wider.  · Stents. In some cases, a health care provider may place a small device (stent) in the esophagus to keep it open.  · Acid-blocking medicines. Taking these can help you manage GERD symptoms after an esophageal stricture. Controlling your GERD symptoms or being free of them can prevent the stricture from returning.  Follow these instructions at home:  Eating and drinking  · Follow instructions from your health care provider about eating or drinking restrictions.  · Cut your food into small pieces, chew well, and eat slowly.  · Try to eat soft food that is easier to swallow.  · Eat and drink only when you are sitting upright.  · Do not drink alcohol. If you need help quitting, ask your health care provider.  · Do not eat during the 3 hours before bedtime.  · Do not overeat at meals.  · Do not eat foods that can make reflux worse. These include:  ? Fatty foods, such as red meat and processed foods.  ? Spicy foods.  ? Soda.  ? Tomato products.  ? Chocolate.  General instructions  · Take over-the-counter and prescription medicines only as told by your health care provider.  · Do not use any products that contain nicotine or tobacco, such as cigarettes, e-cigarettes, and chewing tobacco. If you need help quitting, ask your health care provider.  · Lose weight if you are overweight.  · Wear loose, comfortable clothing.  · When lying in bed, raise your head with pillows. This will help to prevent your stomach contents from backing up into your esophagus while you sleep.  · Keep all follow-up visits. This is  important.  Contact a health care provider if:  · You have problems eating or swallowing.  · You vomit or spit up food and liquid.  · Your symptoms do not improve with treatment.  Get help right away if:  · You can no longer keep down any food, drink, or your saliva.  Summary  · Esophageal stricture is a narrowing of the part of the body that moves food and liquid from your mouth to your stomach (esophagus).  · The esophagus can become narrow because of disease or damage to the area. This can make swallowing difficult, painful, or even impossible.  · Treatment for esophageal stricture depends on what is causing your condition and how severe your condition is. In some cases, procedures may be done to make the opening of the esophagus wider or to place a stent in the esophagus to keep it open.  · Do not drink alcohol, overeat at meals, or eat foods that can make reflux worse.  This information is not intended to replace advice given to you by your health care provider. Make sure you discuss any questions you have with your health care provider.  Document Revised: 05/05/2021 Document Reviewed: 05/05/2021  [employer] Patient Education © 2021 [employer]

## 2021-10-06 NOTE — PROGRESS NOTES
Chief Complaint   Patient presents with   • EGD Performed 09/29/2021       Subjective    Sharan Steiner is a 81 y.o. male. he is here today for follow-up.    History of Present Illness  81-year-old male with history of recurrent esophageal stricture presents for follow-up after EGD.  States since dilation he has been swallowing and eating much better denies any abdominal pain nausea vomiting or changes in his bowel habits.  Denies any melena or hematochezia.  Reports reflux is much better controlled with Nexium daily.  He still has intermittent coughing episodes much less frequent since dilation.  EGD noted benign-appearing esophageal stenosis which was dilated to 54 [place] gastritis was seen duodenum appeared normal no specimens were collected.       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   • Seizures (HCC)      Past Surgical History:   Procedure Laterality Date   • BACK SURGERY     • COLONOSCOPY  01/13/2017   • ENDOSCOPY N/A 7/17/2019    Procedure: ESOPHAGOGASTRODUODENOSCOPY possible dilation;  Surgeon: Jeff Crabtree MD;  Location: [facility] ENDOSCOPY;  Service: Gastroenterology   • ENDOSCOPY N/A 12/9/2019    Procedure: ESOPHAGOGASTRODUODENOSCOPY possible dilation;  Surgeon: Jeff Crabtree MD;  Location: [facility] ENDOSCOPY;  Service: Gastroenterology   • ENDOSCOPY N/A 2/10/2021    Procedure: ESOPHAGOGASTRODUODENOSCOPY possible dilation Monday appt;  Surgeon: Jeff Crabtree MD;  Location: [facility] ENDOSCOPY;  Service: Gastroenterology;  Laterality: N/A;  savory dilation 48-54 fr   • ENDOSCOPY N/A 5/26/2021    Procedure: ESOPHAGOGASTRODUODENOSCOPY possible dilation;  Surgeon: Jeff Crabtree MD;  Location:   [facility] ENDOSCOPY;  Service: Gastroenterology;  Laterality: N/A;   • ENDOSCOPY N/A 9/29/2021    Procedure: ESOPHAGOGASTRODUODENOSCOPY possible dilation;  Surgeon: Jeff Crabtree MD;  Location: [facility] ENDOSCOPY;  Service: Gastroenterology;  Laterality: N/A;   • ORCHIECTOMY     • UPPER GASTROINTESTINAL ENDOSCOPY  02/10/2021   • UPPER GASTROINTESTINAL ENDOSCOPY  09/29/2021     Family History   Problem Relation Age of Onset   • Diabetes Mother    • Hypertension Sister    • Cancer Sister    • Hypertension Brother    • Cancer Brother        Prior to Admission medications    Medication Sig Start Date End Date Taking? Authorizing Provider   aspirin 325 MG EC tablet Take 325 mg by mouth Daily.   Yes Monique Melendez MD   atenolol (TENORMIN) 50 MG tablet Take 50 mg by mouth Daily. 12/13/16  Yes Monique Melendez MD   Breo Ellipta 100-25 MCG/INH inhaler 1 puff Daily. 9/15/21  Yes Monique Melendez MD   cetirizine (zyrTEC) 10 MG tablet Take 10 mg by mouth Daily.   Yes ProviderMonique MD   doxazosin (CARDURA) 4 MG tablet Take 4 mg by mouth Daily.   Yes Provider, MD Monique   esomeprazole (nexIUM) 40 MG capsule Take 1 capsule by mouth Daily. 7/15/21  Yes Nury Ortega APRN   fexofenadine (ALLEGRA) 180 MG tablet Take 180 mg by mouth Daily.   Yes ProviderMonique MD   fluticasone (FLONASE) 50 MCG/ACT nasal spray 2 sprays into each nostril 2 (Two) Times a Day. Administer 2 sprays in each nostril for each dose.  3/2/16  Yes Monique Melendez MD   losartan (COZAAR) 100 MG tablet Take 100 mg by mouth Daily. 1/5/21  Yes Monique Melendez MD   valproic acid (DEPAKENE) 250 MG capsule Take 250 mg by mouth. 9/9/21 3/9/22 Yes Monique Melendez MD   VENTOLIN  (90 Base) MCG/ACT inhaler Inhale 2 puffs Every 6 (Six) Hours As Needed. 6/26/19  Yes Monique Melendez MD   Fluticasone Furoate-Vilanterol (BREO ELLIPTA IN) Inhale 1 puff Daily.  10/6/21  Monique Melendez MD   levETIRAcetam (KEPPRA)  "500 MG tablet Take 1 tablet by mouth 2 (Two) Times a Day. 2/2/17 10/6/21  Domingo Acosta MD     Allergies   Allergen Reactions   • Levofloxacin Other (See Comments)     Seizures   • Penicillins Anaphylaxis   • Tramadol Other (See Comments)     Seizures   • Crestor [Rosuvastatin Calcium] Other (See Comments)     Rhabdo   • Sulfa Antibiotics Rash     Social History     Socioeconomic History   • Marital status:      Spouse name: Not on file   • Number of children: Not on file   • Years of education: Not on file   • Highest education level: Not on file   Tobacco Use   • Smoking status: Former Smoker     Types: Cigarettes     Quit date:      Years since quittin.7   • Smokeless tobacco: Never Used   Vaping Use   • Vaping Use: Never used   Substance and Sexual Activity   • Alcohol use: No   • Drug use: No   • Sexual activity: Defer       Review of Systems  Review of Systems   Constitutional: Negative for activity change, appetite change, chills, diaphoresis, fatigue, fever and unexpected weight change.   HENT: Positive for hearing loss. Negative for sore throat and trouble swallowing (much better ).    Respiratory: Negative for shortness of breath.    Gastrointestinal: Negative for abdominal distention, abdominal pain, anal bleeding, blood in stool, constipation, diarrhea, nausea, rectal pain and vomiting.   Musculoskeletal: Negative for arthralgias.   Skin: Negative for pallor.   Neurological: Negative for light-headedness.        /77   Pulse 63   Ht 175.3 cm (69\")   Wt 82.1 kg (181 lb)   BMI 26.73 kg/m²     Objective    Physical Exam  Constitutional:       Appearance: Normal appearance. He is normal weight.   HENT:      Head: Normocephalic and atraumatic.   Pulmonary:      Effort: Pulmonary effort is normal.   Abdominal:      General: Bowel sounds are normal. There is no distension.      Palpations: Abdomen is soft. There is no mass.      Tenderness: There is no abdominal tenderness. "   Neurological:      Mental Status: He is alert.       Lab on 09/27/2021   Component Date Value Ref Range Status   • COVID19 09/27/2021 Not Detected  Not Detected - Ref. Range Final     Assessment/Plan      1. Stricture and stenosis of esophagus    2. Gastroesophageal reflux disease with esophagitis without hemorrhage    .       Orders placed during this encounter include:  No orders of the defined types were placed in this encounter.      * Surgery not found *    Review and/or summary of lab tests, radiology, procedures, medications. Review and summary of old records and obtaining of history. The risks and benefits of my recommendations, as well as other treatment options were discussed with the patient today. Questions were answered.    No orders of the defined types were placed in this encounter.      Follow-up: Return in about 6 months (around 4/6/2022) for Recheck.          This document has been electronically signed by OSEI Harper on October 6, 2021 14:09 CDT           I spent 15 minutes caring for Sharan on this date of service. This time includes time spent by me in the following activities:preparing for the visit, reviewing tests, obtaining and/or reviewing a separately obtained history, performing a medically appropriate examination and/or evaluation , counseling and educating the patient/family/caregiver, ordering medications, tests, or procedures, referring and communicating with other health care professionals , documenting information in the medical record, independently interpreting results and communicating that information with the patient/family/caregiver and care coordination    Results for orders placed or performed in visit on 09/27/21   COVID-19, [facility] IN-HOUSE, NP SWAB IN TRANSPORT MEDIA 8-10 HR TAT - Swab, Nasopharynx    Specimen: Nasopharynx; Swab   Result Value Ref Range    COVID19 Not Detected Not Detected - Ref. Range   Results for orders placed or performed in visit on  05/25/21   COVID-19, [facility] IN-HOUSE, NP SWAB IN TRANSPORT MEDIA 8-10 HR TAT - Swab, Nasopharynx    Specimen: Nasopharynx; Swab   Result Value Ref Range    COVID19 Not Detected Not Detected - Ref. Range   Results for orders placed or performed in visit on 02/07/21   COVID-19,[facility] IN-HOUSE, NP/OP SWAB IN UTM/VTM/SALINE TRANSPORT MEDIA,24 HR TAT - Swab, Nasopharynx    Specimen: Nasopharynx; Swab   Result Value Ref Range    COVID19 Not Detected Not Detected - Ref. Range   Results for orders placed or performed during the hospital encounter of 07/17/19   Tissue Pathology Exam    Specimen: A: Gastric, Antrum; Tissue    B: Esophagus, Distal; Tissue   Result Value Ref Range    Case Report       Surgical Pathology Report                         Case: [id number]                                  Authorizing Provider:  Jeff Crabtree MD        Collected:           07/17/2019 05:30 PM          Ordering Location:     [facility]             Received:            07/18/2019 06:14 AM                                 [facility]                                                     Pathologist:           Domenic Espino MD                                                        Specimens:   1) - Gastric, Antrum, antrum  cold bx                                                               2) - Esophagus, Distal, distal esophagus   cold bx                                         Final Diagnosis       1.  GASTRIC ANTRUM, MUCOSAL BIOPSY:  MILD CHRONIC GASTRITIS.  NO EVIDENCE OF HELICOBACTER PYLORI.    2.  DISTAL ESOPHAGUS, MUCOSAL BIOPSY:  SEGMENTS OF MILDLY INFLAMED STRATIFIED SQUAMOUS EPITHELIUM.      Gross Description       In 2 containers, each of these show mucosal biopsies measuring up to 0.3 cm in greatest dimension.  Embedded accordingly.  1A antrum; 2A distal esophagus.     Results for orders placed or performed in visit on 03/13/18   Tissue Pathology Exam    Specimen: Clavicle, Right; Hardware /  Foreign Body   Result Value Ref Range    Case Report       Surgical Pathology Report                         Case: [id number]                                  Authorizing Provider:  Elroy Lucas MD Collected:           03/13/2018 03:35 PM          Ordering Location:     [facility]     Received:            03/14/2018 07:48 AM                                 [facility]                                                        Pathologist:           Domenic Espino MD                                                         Specimen:    Clavicle, Right, Mediport                                                                  Clinical Information       Removal of Mediport right clavicular area.      Final Diagnosis       OLD MEDIPORT.      Gross Description       The specimen consists of a Mediport with a 25.0 cm rubber lead.      Embedded Images     Results for orders placed or performed in visit on 03/09/18   CBC Auto Differential    Specimen: Blood   Result Value Ref Range    WBC 6.86 3.20 - 9.80 10*3/mm3    RBC 4.28 (L) 4.37 - 5.74 10*6/mm3    Hemoglobin 12.2 (L) 13.7 - 17.3 g/dL    Hematocrit 35.7 (L) 39.0 - 49.0 %    MCV 83.4 80.0 - 98.0 fL    MCH 28.5 26.5 - 34.0 pg    MCHC 34.2 31.5 - 36.3 g/dL    RDW 12.4 11.5 - 14.5 %    RDW-SD 37.6 35.1 - 43.9 fl    MPV 9.8 8.0 - 12.0 fL    Platelets 185 150 - 450 10*3/mm3    Neutrophil % 69.1 37.0 - 80.0 %    Lymphocyte % 10.6 10.0 - 50.0 %    Monocyte % 12.7 (H) 0.0 - 12.0 %    Eosinophil % 6.6 0.0 - 7.0 %    Basophil % 0.6 0.0 - 2.0 %    Immature Grans % 0.4 0.0 - 0.5 %    Neutrophils, Absolute 4.74 2.00 - 8.60 10*3/mm3    Lymphocytes, Absolute 0.73 0.60 - 4.20 10*3/mm3    Monocytes, Absolute 0.87 0.00 - 0.90 10*3/mm3    Eosinophils, Absolute 0.45 0.00 - 0.70 10*3/mm3    Basophils, Absolute 0.04 0.00 - 0.20 10*3/mm3    Immature Grans, Absolute 0.03 (H) 0.00 - 0.02 10*3/mm3   Lactate Dehydrogenase    Specimen: Blood   Result Value Ref Range      313 - 618 U/L   Comprehensive Metabolic Panel    Specimen: Blood   Result Value Ref Range    Glucose 94 60 - 100 mg/dL    BUN 11 7 - 21 mg/dL    Creatinine 0.74 0.70 - 1.30 mg/dL    Sodium 137 137 - 145 mmol/L    Potassium 4.5 3.5 - 5.1 mmol/L    Chloride 97 95 - 110 mmol/L    CO2 27.0 22.0 - 31.0 mmol/L    Calcium 9.0 8.4 - 10.2 mg/dL    Total Protein 6.6 6.3 - 8.6 g/dL    Albumin 3.90 3.40 - 4.80 g/dL    ALT (SGPT) 29 21 - 72 U/L    AST (SGOT) 26 17 - 59 U/L    Alkaline Phosphatase 110 38 - 126 U/L    Total Bilirubin 0.4 0.2 - 1.3 mg/dL    eGFR Non  Amer 102 (H) 42 - 98 mL/min/1.73    Globulin 2.7 2.3 - 3.5 gm/dL    A/G Ratio 1.4 1.1 - 1.8 g/dL    BUN/Creatinine Ratio 14.9 7.0 - 25.0    Anion Gap 13.0 5.0 - 15.0 mmol/L   Results for orders placed or performed in visit on 12/08/17   Iron and TIBC    Specimen: Blood   Result Value Ref Range    Iron 82 49 - 181 mcg/dL    TIBC 378 261 - 462 mcg/dL    Iron Saturation 22 20 - 55 %   Folate    Specimen: Blood   Result Value Ref Range    Folate >20.00 2.76 - 21.00 ng/mL   Ferritin    Specimen: Blood   Result Value Ref Range    Ferritin 55.30 17.90 - 464.00 ng/mL   Vitamin B12    Specimen: Blood   Result Value Ref Range    Vitamin B-12 298 239 - 931 pg/mL   Results for orders placed or performed in visit on 12/08/17   CBC Auto Differential    Specimen: Blood   Result Value Ref Range    WBC 5.61 3.20 - 9.80 10*3/mm3    RBC 4.14 (L) 4.37 - 5.74 10*6/mm3    Hemoglobin 12.1 (L) 13.7 - 17.3 g/dL    Hematocrit 34.5 (L) 39.0 - 49.0 %    MCV 83.3 80.0 - 98.0 fL    MCH 29.2 26.5 - 34.0 pg    MCHC 35.1 31.5 - 36.3 g/dL    RDW 12.7 11.5 - 14.5 %    RDW-SD 38.5 35.1 - 43.9 fl    MPV 9.6 8.0 - 12.0 fL    Platelets 172 150 - 450 10*3/mm3    Neutrophil % 66.4 37.0 - 80.0 %    Lymphocyte % 18.5 10.0 - 50.0 %    Monocyte % 9.6 0.0 - 12.0 %    Eosinophil % 4.8 0.0 - 7.0 %    Basophil % 0.5 0.0 - 2.0 %    Immature Grans % 0.2 0.0 - 0.5 %    Neutrophils, Absolute 3.72 2.00 -  8.60 10*3/mm3    Lymphocytes, Absolute 1.04 0.60 - 4.20 10*3/mm3    Monocytes, Absolute 0.54 0.00 - 0.90 10*3/mm3    Eosinophils, Absolute 0.27 0.00 - 0.70 10*3/mm3    Basophils, Absolute 0.03 0.00 - 0.20 10*3/mm3    Immature Grans, Absolute 0.01 0.00 - 0.02 10*3/mm3   Lactate Dehydrogenase    Specimen: Blood   Result Value Ref Range     313 - 618 U/L   Comprehensive Metabolic Panel    Specimen: Blood   Result Value Ref Range    Glucose 87 60 - 100 mg/dL    BUN 11 7 - 21 mg/dL    Creatinine 0.85 0.70 - 1.30 mg/dL    Sodium 137 137 - 145 mmol/L    Potassium 4.3 3.5 - 5.1 mmol/L    Chloride 101 95 - 110 mmol/L    CO2 28.0 22.0 - 31.0 mmol/L    Calcium 9.4 8.4 - 10.2 mg/dL    Total Protein 6.7 6.3 - 8.6 g/dL    Albumin 4.00 3.40 - 4.80 g/dL    ALT (SGPT) 32 21 - 72 U/L    AST (SGOT) 33 17 - 59 U/L    Alkaline Phosphatase 101 38 - 126 U/L    Total Bilirubin 0.5 0.2 - 1.3 mg/dL    eGFR Non African Amer 87 42 - 98 mL/min/1.73    Globulin 2.7 2.3 - 3.5 gm/dL    A/G Ratio 1.5 1.1 - 1.8 g/dL    BUN/Creatinine Ratio 12.9 7.0 - 25.0    Anion Gap 8.0 5.0 - 15.0 mmol/L     *Note: Due to a large number of results and/or encounters for the requested time period, some results have not been displayed. A complete set of results can be found in Results Review.

## 2022-03-31 ENCOUNTER — OFFICE VISIT (OUTPATIENT)
Dept: GASTROENTEROLOGY | Facility: CLINIC | Age: 82
End: 2022-03-31

## 2022-03-31 VITALS
HEIGHT: 69 IN | DIASTOLIC BLOOD PRESSURE: 62 MMHG | HEART RATE: 76 BPM | WEIGHT: 178.6 LBS | BODY MASS INDEX: 26.45 KG/M2 | SYSTOLIC BLOOD PRESSURE: 102 MMHG

## 2022-03-31 DIAGNOSIS — K21.00 GASTROESOPHAGEAL REFLUX DISEASE WITH ESOPHAGITIS WITHOUT HEMORRHAGE: ICD-10-CM

## 2022-03-31 DIAGNOSIS — Z87.19 HISTORY OF ESOPHAGEAL STRICTURE: Primary | ICD-10-CM

## 2022-03-31 PROCEDURE — 99213 OFFICE O/P EST LOW 20 MIN: CPT | Performed by: NURSE PRACTITIONER

## 2022-03-31 RX ORDER — DEXTROSE AND SODIUM CHLORIDE 5; .45 G/100ML; G/100ML
30 INJECTION, SOLUTION INTRAVENOUS CONTINUOUS PRN
Status: CANCELLED | OUTPATIENT
Start: 2022-04-25

## 2022-04-22 ENCOUNTER — LAB (OUTPATIENT)
Dept: LAB | Facility: HOSPITAL | Age: 82
End: 2022-04-22

## 2022-04-22 DIAGNOSIS — Z87.19 HISTORY OF ESOPHAGEAL STRICTURE: ICD-10-CM

## 2022-04-22 DIAGNOSIS — K21.00 GASTROESOPHAGEAL REFLUX DISEASE WITH ESOPHAGITIS WITHOUT HEMORRHAGE: ICD-10-CM

## 2022-04-22 LAB — SARS-COV-2 N GENE RESP QL NAA+PROBE: NOT DETECTED

## 2022-04-22 PROCEDURE — 87635 SARS-COV-2 COVID-19 AMP PRB: CPT

## 2022-04-22 PROCEDURE — C9803 HOPD COVID-19 SPEC COLLECT: HCPCS

## 2022-04-25 ENCOUNTER — HOSPITAL ENCOUNTER (OUTPATIENT)
Facility: HOSPITAL | Age: 82
Setting detail: HOSPITAL OUTPATIENT SURGERY
Discharge: HOME | End: 2022-04-25
Attending: INTERNAL MEDICINE | Admitting: INTERNAL MEDICINE

## 2022-04-25 ENCOUNTER — ANESTHESIA EVENT (OUTPATIENT)
Dept: GASTROENTEROLOGY | Facility: HOSPITAL | Age: 82
End: 2022-04-25

## 2022-04-25 ENCOUNTER — ANESTHESIA (OUTPATIENT)
Dept: GASTROENTEROLOGY | Facility: HOSPITAL | Age: 82
End: 2022-04-25

## 2022-04-25 VITALS
TEMPERATURE: 97.1 F | OXYGEN SATURATION: 96 % | HEART RATE: 78 BPM | SYSTOLIC BLOOD PRESSURE: 190 MMHG | WEIGHT: 178 LBS | DIASTOLIC BLOOD PRESSURE: 87 MMHG | BODY MASS INDEX: 26.36 KG/M2 | HEIGHT: 69 IN | RESPIRATION RATE: 20 BRPM

## 2022-04-25 DIAGNOSIS — K21.00 GASTROESOPHAGEAL REFLUX DISEASE WITH ESOPHAGITIS WITHOUT HEMORRHAGE: ICD-10-CM

## 2022-04-25 DIAGNOSIS — Z87.19 HISTORY OF ESOPHAGEAL STRICTURE: ICD-10-CM

## 2022-04-25 DIAGNOSIS — R13.19 OTHER DYSPHAGIA: Primary | ICD-10-CM

## 2022-04-25 PROCEDURE — C1769 GUIDE WIRE: HCPCS | Performed by: INTERNAL MEDICINE

## 2022-04-25 PROCEDURE — 25010000002 PROPOFOL 10 MG/ML EMULSION: Performed by: NURSE ANESTHETIST, CERTIFIED REGISTERED

## 2022-04-25 PROCEDURE — 43248 EGD GUIDE WIRE INSERTION: CPT | Performed by: INTERNAL MEDICINE

## 2022-04-25 RX ORDER — PROPOFOL 10 MG/ML
VIAL (ML) INTRAVENOUS AS NEEDED
Status: DISCONTINUED | OUTPATIENT
Start: 2022-04-25 | End: 2022-04-25 | Stop reason: SURG

## 2022-04-25 RX ORDER — DEXTROSE AND SODIUM CHLORIDE 5; .45 G/100ML; G/100ML
30 INJECTION, SOLUTION INTRAVENOUS CONTINUOUS PRN
Status: DISCONTINUED | OUTPATIENT
Start: 2022-04-25 | End: 2022-04-25 | Stop reason: HOSPADM

## 2022-04-25 RX ORDER — DEXTROSE AND SODIUM CHLORIDE 5; .45 G/100ML; G/100ML
30 INJECTION, SOLUTION INTRAVENOUS CONTINUOUS PRN
Status: CANCELLED | OUTPATIENT
Start: 2022-05-03

## 2022-04-25 RX ADMIN — PROPOFOL 20 MG: 10 INJECTION, EMULSION INTRAVENOUS at 13:02

## 2022-04-25 RX ADMIN — PROPOFOL 20 MG: 10 INJECTION, EMULSION INTRAVENOUS at 12:59

## 2022-04-25 RX ADMIN — DEXTROSE AND SODIUM CHLORIDE 30 ML/HR: 5; 450 INJECTION, SOLUTION INTRAVENOUS at 12:36

## 2022-04-25 RX ADMIN — PROPOFOL 100 MG: 10 INJECTION, EMULSION INTRAVENOUS at 12:55

## 2022-04-25 NOTE — ANESTHESIA POSTPROCEDURE EVALUATION
Patient: Sharan Steiner    Procedure Summary     Date: 04/25/22 Room / [facility] END[facility]COPY    Anesthesia Start: 1253 Anesthesia Stop: 1304    Procedure: ESOPHAGOGASTRODUODENOSCOPY possible dilation (N/A ) Diagnosis:       History of esophageal stricture      Gastroesophageal reflux disease with esophagitis without hemorrhage      (History of esophageal stricture [Z87.19])      (Gastroesophageal reflux disease with esophagitis without hemorrhage [K21.00])    Surgeons: Jeff Crabtree MD Provider: Haley Napier CRNA    Anesthesia Type: MAC ASA Status: 3          Anesthesia Type: MAC    Vitals  No vitals data found for the desired time range.          Post Anesthesia Care and Evaluation    Patient location during evaluation: bedside  Patient participation: complete - patient participated  Level of consciousness: awake and awake and alert  Pain score: 0  Pain management: adequate  Airway patency: patent  Anesthetic complications: No anesthetic complications  PONV Status: none  Cardiovascular status: acceptable and stable  Respiratory status: acceptable, room air and spontaneous ventilation  Hydration status: acceptable    Comments: BP: 180/88   HR:  79  SAT:  94  RR:  22  TEMP: 97.0

## 2022-04-25 NOTE — ANESTHESIA PREPROCEDURE EVALUATION
Anesthesia Evaluation     Patient summary reviewed and Nursing notes reviewed   no history of anesthetic complications:  NPO Solid Status: > 8 hours  NPO Liquid Status: > 2 hours           Airway   Mallampati: II  TM distance: >3 FB  Neck ROM: full  No difficulty expected  Dental    (+) lower dentures and upper dentures    Pulmonary - normal exam   (+) COPD moderate, shortness of breath,   Cardiovascular - normal exam    ECG reviewed  Patient on routine beta blocker    (+) hypertension well controlled 2 medications or greater,     ROS comment: 2017  · Left Ventricle: Left ventricular function is normal. Estimated EF appears to be in the range of greater than 70%  · The left ventricular cavity is mildly dilated.  · Left ventricular wall thickness is consistent with severe concentric hypertrophy.  · Left ventricular diastolic dysfunction is noted (grade I) consistent with impaired relaxation. Normal left atrial pressure  · Right Ventricle: Normal cavity size, wall thickness, systolic function and septal motion noted.  · Mild aortic valve regurgitation is present.  · Mild to moderate tricuspid valve regurgitation is present  · Estimated right ventricular systolic pressure from tricuspid regurgitation is moderately elevated (45-55 mmHg).  · Mild pulmonary hypertension is present.  · There is no evidence of pericardial effusion.    Neuro/Psych  (+) seizures well controlled,      ROS Comment: Pt reports no seizures in the past 7 years  GI/Hepatic/Renal/Endo    (+)  GERD well controlled,      Musculoskeletal (-) negative ROS    Abdominal  - normal exam   Substance History - negative use     OB/GYN          Other      history of cancer remission                    Anesthesia Plan    ASA 3     MAC     intravenous induction     Anesthetic plan, all risks, benefits, and alternatives have been provided, discussed and informed consent has been obtained with: patient.

## 2022-05-02 ENCOUNTER — LAB (OUTPATIENT)
Dept: LAB | Facility: HOSPITAL | Age: 82
End: 2022-05-02

## 2022-05-02 DIAGNOSIS — Z01.818 PREOP TESTING: Primary | ICD-10-CM

## 2022-05-02 LAB — SARS-COV-2 N GENE RESP QL NAA+PROBE: NOT DETECTED

## 2022-05-02 PROCEDURE — 87635 SARS-COV-2 COVID-19 AMP PRB: CPT

## 2022-05-03 ENCOUNTER — ANESTHESIA (OUTPATIENT)
Dept: GASTROENTEROLOGY | Facility: HOSPITAL | Age: 82
End: 2022-05-03

## 2022-05-03 ENCOUNTER — HOSPITAL ENCOUNTER (OUTPATIENT)
Facility: HOSPITAL | Age: 82
Setting detail: HOSPITAL OUTPATIENT SURGERY
Discharge: HOME | End: 2022-05-03
Attending: INTERNAL MEDICINE | Admitting: INTERNAL MEDICINE

## 2022-05-03 ENCOUNTER — ANESTHESIA EVENT (OUTPATIENT)
Dept: GASTROENTEROLOGY | Facility: HOSPITAL | Age: 82
End: 2022-05-03

## 2022-05-03 VITALS
HEART RATE: 80 BPM | BODY MASS INDEX: 25.33 KG/M2 | HEIGHT: 69 IN | DIASTOLIC BLOOD PRESSURE: 67 MMHG | RESPIRATION RATE: 18 BRPM | WEIGHT: 171 LBS | SYSTOLIC BLOOD PRESSURE: 148 MMHG | OXYGEN SATURATION: 95 % | TEMPERATURE: 97.4 F

## 2022-05-03 PROCEDURE — 43236 UPPR GI SCOPE W/SUBMUC INJ: CPT | Performed by: INTERNAL MEDICINE

## 2022-05-03 PROCEDURE — C1769 GUIDE WIRE: HCPCS | Performed by: INTERNAL MEDICINE

## 2022-05-03 PROCEDURE — 25010000002 PROPOFOL 10 MG/ML EMULSION

## 2022-05-03 PROCEDURE — 43248 EGD GUIDE WIRE INSERTION: CPT | Performed by: INTERNAL MEDICINE

## 2022-05-03 PROCEDURE — 25010000002 ONABOTULINUMTOXINA 100 UNITS RECONSTITUTED SOLUTION: Performed by: INTERNAL MEDICINE

## 2022-05-03 RX ORDER — DEXTROSE AND SODIUM CHLORIDE 5; .45 G/100ML; G/100ML
30 INJECTION, SOLUTION INTRAVENOUS CONTINUOUS PRN
Status: DISCONTINUED | OUTPATIENT
Start: 2022-05-03 | End: 2022-05-03 | Stop reason: HOSPADM

## 2022-05-03 RX ORDER — LIDOCAINE HYDROCHLORIDE 20 MG/ML
INJECTION, SOLUTION INTRAVENOUS AS NEEDED
Status: DISCONTINUED | OUTPATIENT
Start: 2022-05-03 | End: 2022-05-03 | Stop reason: SURG

## 2022-05-03 RX ORDER — PROPOFOL 10 MG/ML
VIAL (ML) INTRAVENOUS AS NEEDED
Status: DISCONTINUED | OUTPATIENT
Start: 2022-05-03 | End: 2022-05-03 | Stop reason: SURG

## 2022-05-03 RX ADMIN — PROPOFOL 30 MG: 10 INJECTION, EMULSION INTRAVENOUS at 13:52

## 2022-05-03 RX ADMIN — PROPOFOL 100 MG: 10 INJECTION, EMULSION INTRAVENOUS at 13:48

## 2022-05-03 RX ADMIN — LIDOCAINE HYDROCHLORIDE 80 MG: 20 INJECTION, SOLUTION INTRAVENOUS at 13:48

## 2022-05-03 RX ADMIN — PROPOFOL 20 MG: 10 INJECTION, EMULSION INTRAVENOUS at 13:58

## 2022-05-03 RX ADMIN — PROPOFOL 30 MG: 10 INJECTION, EMULSION INTRAVENOUS at 13:50

## 2022-05-03 RX ADMIN — DEXTROSE AND SODIUM CHLORIDE 30 ML/HR: 5; 450 INJECTION, SOLUTION INTRAVENOUS at 12:50

## 2022-05-03 RX ADMIN — PROPOFOL 20 MG: 10 INJECTION, EMULSION INTRAVENOUS at 13:55

## 2022-05-03 NOTE — ANESTHESIA PREPROCEDURE EVALUATION
Anesthesia Evaluation     Patient summary reviewed and Nursing notes reviewed   no history of anesthetic complications:  NPO Solid Status: > 8 hours  NPO Liquid Status: > 2 hours           Airway   Mallampati: II  TM distance: >3 FB  Neck ROM: full  No difficulty expected  Dental    (+) upper dentures    Pulmonary - normal exam   (+) COPD moderate, shortness of breath,   Cardiovascular - normal exam    ECG reviewed  Patient on routine beta blocker    (+) hypertension well controlled 2 medications or greater,     ROS comment: 2017  · Left Ventricle: Left ventricular function is normal. Estimated EF appears to be in the range of greater than 70%  · The left ventricular cavity is mildly dilated.  · Left ventricular wall thickness is consistent with severe concentric hypertrophy.  · Left ventricular diastolic dysfunction is noted (grade I) consistent with impaired relaxation. Normal left atrial pressure  · Right Ventricle: Normal cavity size, wall thickness, systolic function and septal motion noted.  · Mild aortic valve regurgitation is present.  · Mild to moderate tricuspid valve regurgitation is present  · Estimated right ventricular systolic pressure from tricuspid regurgitation is moderately elevated (45-55 mmHg).  · Mild pulmonary hypertension is present.  · There is no evidence of pericardial effusion.    Neuro/Psych  (+) seizures well controlled,      ROS Comment: Pt reports no seizures in the past 7 years  GI/Hepatic/Renal/Endo    (+)  GERD well controlled,      Musculoskeletal (-) negative ROS    Abdominal  - normal exam   Substance History - negative use     OB/GYN          Other      history of cancer remission                      Anesthesia Plan    ASA 3     MAC     intravenous induction     Anesthetic plan, all risks, benefits, and alternatives have been provided, discussed and informed consent has been obtained with: patient.

## 2022-05-03 NOTE — H&P
No chief complaint on file.      Subjective    Sharan Steiner is a 81 y.o. male. he is here today for follow-up.    History of Present Illness  81-year-old male presents with his son-in-law to discuss recurrent dysphagia GERD and coughing up large amount of stomach secretions.  Reports when he undergoes EGD with dilation symptoms are improved for about 6 to 8 weeks and slowly began to occur again.  Most recent EGD was completed September 21 noted benign-appearing stenosis dilated 54 [place] gastritis.  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   • Seizures (HCC)      Past Surgical History:   Procedure Laterality Date   • BACK SURGERY     • COLONOSCOPY  01/13/2017   • ENDOSCOPY N/A 07/17/2019    Procedure: ESOPHAGOGASTRODUODENOSCOPY possible dilation;  Surgeon: Jeff Crabtree MD;  Location: [facility] ENDOSCOPY;  Service: Gastroenterology   • ENDOSCOPY N/A 12/09/2019    Procedure: ESOPHAGOGASTRODUODENOSCOPY possible dilation;  Surgeon: Jeff Crabtree MD;  Location: [facility] ENDOSCOPY;  Service: Gastroenterology   • ENDOSCOPY N/A 02/10/2021    Procedure: ESOPHAGOGASTRODUODENOSCOPY possible dilation Monday appt;  Surgeon: Jeff Crabtree MD;  Location: [facility] ENDOSCOPY;  Service: Gastroenterology;  Laterality: N/A;  savory dilation 48-54 fr   • ENDOSCOPY N/A 05/26/2021    Procedure: ESOPHAGOGASTRODUODENOSCOPY possible dilation;  Surgeon: Jeff Crabtree MD;  Location: [facility] ENDOSCOPY;  Service: Gastroenterology;  Laterality: N/A;   • ENDOSCOPY N/A 09/29/2021    Procedure: ESOPHAGOGASTRODUODENOSCOPY  possible dilation;  Surgeon: Jeff Crabtree MD;  Location: [facility] ENDOSCOPY;  Service: Gastroenterology;  Laterality: N/A;   • ORCHIECTOMY     • UPPER GASTROINTESTINAL ENDOSCOPY  02/10/2021   • UPPER GASTROINTESTINAL ENDOSCOPY  09/29/2021     Family History   Problem Relation Age of Onset   • Diabetes Mother    • Hypertension Sister    • Cancer Sister    • Hypertension Brother    • Cancer Brother        Prior to Admission medications    Medication Sig Start Date End Date Taking? Authorizing Provider   aspirin 325 MG EC tablet Take 325 mg by mouth Daily.   Yes Monique Melendez MD   atenolol (TENORMIN) 50 MG tablet Take 50 mg by mouth Daily. 12/13/16  Yes Monique Melendez MD   Breo Ellipta 100-25 MCG/INH inhaler 1 puff Daily. 9/15/21  Yes Monique Melendez MD   doxazosin (CARDURA) 4 MG tablet Take 4 mg by mouth Daily.   Yes Monique Melendez MD   esomeprazole (nexIUM) 40 MG capsule Take 1 capsule by mouth Daily. 7/15/21  Yes Nury Ortega APRN   fluticasone (FLONASE) 50 MCG/ACT nasal spray 2 sprays into the nostril(s) as directed by provider 2 (Two) Times a Day. Administer 2 sprays in each nostril for each dose. 3/2/16  Yes Monique Melendez MD   losartan (COZAAR) 100 MG tablet Take 100 mg by mouth Daily. 1/5/21  Yes Monique Melendez MD   VENTOLIN  (90 Base) MCG/ACT inhaler Inhale 2 puffs Every 6 (Six) Hours As Needed. 6/26/19  Yes Monique Melendez MD   cetirizine (zyrTEC) 10 MG tablet Take 10 mg by mouth Daily.    ProviderMonique MD   fexofenadine (ALLEGRA) 180 MG tablet Take 180 mg by mouth Daily.    ProviderMonique MD   valproic acid (DEPAKENE) 250 MG capsule Take 250 mg by mouth. 9/9/21 3/9/22  Monique Melendez MD     Allergies   Allergen Reactions   • Levofloxacin Other (See Comments)     Seizures   • Penicillins Anaphylaxis   • Tramadol Other (See Comments)     Seizures   • Crestor [Rosuvastatin Calcium] Other (See Comments)     Rhabdo   • Sulfa  Antibiotics Rash     Social History     Socioeconomic History   • Marital status:    Tobacco Use   • Smoking status: Former Smoker     Types: Cigarettes     Quit date:      Years since quittin.3   • Smokeless tobacco: Never Used   • Tobacco comment: passive   Vaping Use   • Vaping Use: Never used   Substance and Sexual Activity   • Alcohol use: Not Currently     Comment: none in 40 years   • Drug use: Never   • Sexual activity: Defer       Review of Systems  Review of Systems   Constitutional: Negative for activity change, appetite change, chills, diaphoresis, fatigue, fever and unexpected weight change.   HENT: Positive for trouble swallowing. Negative for sore throat.    Respiratory: Negative for shortness of breath.    Gastrointestinal: Negative for abdominal distention, abdominal pain, anal bleeding, blood in stool, constipation, diarrhea, nausea, rectal pain and vomiting.   Musculoskeletal: Negative for arthralgias.   Skin: Negative for pallor.   Neurological: Negative for light-headedness.        There were no vitals taken for this visit.    Objective    Physical Exam  Constitutional:       General: He is not in acute distress.     Appearance: Normal appearance. He is normal weight. He is not ill-appearing.   HENT:      Head: Normocephalic and atraumatic.   Pulmonary:      Effort: Pulmonary effort is normal.   Abdominal:      General: Abdomen is flat. Bowel sounds are normal. There is no distension.      Palpations: Abdomen is soft. There is no mass.      Tenderness: There is no abdominal tenderness.   Neurological:      Mental Status: He is alert.       Lab on 2021   Component Date Value Ref Range Status   • COVID19 2021 Not Detected  Not Detected - Ref. Range Final     Assessment/Plan      No diagnosis found..       Orders placed during this encounter include:  No orders of the defined types were placed in this encounter.      ESOPHAGOGASTRODUODENOSCOPY (N/A)    Review and/or  summary of lab tests, radiology, procedures, medications. Review and summary of old records and obtaining of history. The risks and benefits of my recommendations, as well as other treatment options were discussed with the patient today. Questions were answered.    No orders of the defined types were placed in this encounter.      Follow-up: No follow-ups on file.          This document has been electronically signed by Jeff Crabtree MD on May 3, 2022 12:40 CDT           I spent 16 minutes caring for Sharan on this date of service. This time includes time spent by me in the following activities:preparing for the visit, reviewing tests, obtaining and/or reviewing a separately obtained history, performing a medically appropriate examination and/or evaluation , counseling and educating the patient/family/caregiver, ordering medications, tests, or procedures, referring and communicating with other health care professionals , documenting information in the medical record and care coordination    Results for orders placed or performed in visit on 05/02/22   COVID-19,  [facility] IN-HOUSE, NP SWAB IN TRANSPORT MEDIA 8-10 HR TAT - Swab, Nasopharynx    Specimen: Nasopharynx; Swab   Result Value Ref Range    COVID19 Not Detected Not Detected - Ref. Range   Results for orders placed or performed in visit on 04/22/22   COVID-19,  [facility] IN-HOUSE, NP SWAB IN TRANSPORT MEDIA 8-10 HR TAT - Swab, Nasopharynx    Specimen: Nasopharynx; Swab   Result Value Ref Range    COVID19 Not Detected Not Detected - Ref. Range   Results for orders placed or performed in visit on 09/27/21   COVID-19,  [facility] IN-HOUSE, NP SWAB IN TRANSPORT MEDIA 8-10 HR TAT - Swab, Nasopharynx    Specimen: Nasopharynx; Swab   Result Value Ref Range    COVID19 Not Detected Not Detected - Ref. Range   Results for orders placed or performed in visit on 05/25/21   COVID-19,  [facility] IN-HOUSE, NP SWAB IN TRANSPORT MEDIA 8-10 HR TAT - Swab, Nasopharynx    Specimen:  Nasopharynx; Swab   Result Value Ref Range    COVID19 Not Detected Not Detected - Ref. Range   Results for orders placed or performed in visit on 02/07/21   COVID-19,[facility] IN-HOUSE, NP/OP SWAB IN UTM/VTM/SALINE TRANSPORT MEDIA,24 HR TAT - Swab, Nasopharynx    Specimen: Nasopharynx; Swab   Result Value Ref Range    COVID19 Not Detected Not Detected - Ref. Range   Results for orders placed or performed during the hospital encounter of 07/17/19   Tissue Pathology Exam    Specimen: A: Gastric, Antrum; Tissue    B: Esophagus, Distal; Tissue   Result Value Ref Range    Case Report       Surgical Pathology Report                         Case: [id number]                                  Authorizing Provider:  Jeff Crabtree MD        Collected:           07/17/2019 05:30 PM          Ordering Location:     [facility]             Received:            07/18/2019 06:14 AM                                 [place] [facility]                                                     Pathologist:           Domenic Espino MD                                                        Specimens:   1) - Gastric, Antrum, antrum  cold bx                                                               2) - Esophagus, Distal, distal esophagus   cold bx                                         Final Diagnosis       1.  GASTRIC ANTRUM, MUCOSAL BIOPSY:  MILD CHRONIC GASTRITIS.  NO EVIDENCE OF HELICOBACTER PYLORI.    2.  DISTAL ESOPHAGUS, MUCOSAL BIOPSY:  SEGMENTS OF MILDLY INFLAMED STRATIFIED SQUAMOUS EPITHELIUM.      Gross Description       In 2 containers, each of these show mucosal biopsies measuring up to 0.3 cm in greatest dimension.  Embedded accordingly.  1A antrum; 2A distal esophagus.     Results for orders placed or performed in visit on 03/13/18   Tissue Pathology Exam    Specimen: Clavicle, Right; Hardware / Foreign Body   Result Value Ref Range    Case Report       Surgical Pathology Report                          Case: [id number]                                  Authorizing Provider:  Elroy Lucas MD Collected:           03/13/2018 03:35 PM          Ordering Location:     [facility]     Received:            03/14/2018 07:48 AM                                 [facility]                                                        Pathologist:           Domenic Espino MD                                                         Specimen:    Clavicle, Right, Mediport                                                                  Clinical Information       Removal of Mediport right clavicular area.      Final Diagnosis       OLD MEDIPORT.      Gross Description       The specimen consists of a Mediport with a 25.0 cm rubber lead.      Embedded Images     Results for orders placed or performed in visit on 03/09/18   CBC Auto Differential    Specimen: Blood   Result Value Ref Range    WBC 6.86 3.20 - 9.80 10*3/mm3    RBC 4.28 (L) 4.37 - 5.74 10*6/mm3    Hemoglobin 12.2 (L) 13.7 - 17.3 g/dL    Hematocrit 35.7 (L) 39.0 - 49.0 %    MCV 83.4 80.0 - 98.0 fL    MCH 28.5 26.5 - 34.0 pg    MCHC 34.2 31.5 - 36.3 g/dL    RDW 12.4 11.5 - 14.5 %    RDW-SD 37.6 35.1 - 43.9 fl    MPV 9.8 8.0 - 12.0 fL    Platelets 185 150 - 450 10*3/mm3    Neutrophil % 69.1 37.0 - 80.0 %    Lymphocyte % 10.6 10.0 - 50.0 %    Monocyte % 12.7 (H) 0.0 - 12.0 %    Eosinophil % 6.6 0.0 - 7.0 %    Basophil % 0.6 0.0 - 2.0 %    Immature Grans % 0.4 0.0 - 0.5 %    Neutrophils, Absolute 4.74 2.00 - 8.60 10*3/mm3    Lymphocytes, Absolute 0.73 0.60 - 4.20 10*3/mm3    Monocytes, Absolute 0.87 0.00 - 0.90 10*3/mm3    Eosinophils, Absolute 0.45 0.00 - 0.70 10*3/mm3    Basophils, Absolute 0.04 0.00 - 0.20 10*3/mm3    Immature Grans, Absolute 0.03 (H) 0.00 - 0.02 10*3/mm3   Lactate Dehydrogenase    Specimen: Blood   Result Value Ref Range     313 - 618 U/L   Comprehensive Metabolic Panel    Specimen: Blood   Result Value Ref Range     Glucose 94 60 - 100 mg/dL    BUN 11 7 - 21 mg/dL    Creatinine 0.74 0.70 - 1.30 mg/dL    Sodium 137 137 - 145 mmol/L    Potassium 4.5 3.5 - 5.1 mmol/L    Chloride 97 95 - 110 mmol/L    CO2 27.0 22.0 - 31.0 mmol/L    Calcium 9.0 8.4 - 10.2 mg/dL    Total Protein 6.6 6.3 - 8.6 g/dL    Albumin 3.90 3.40 - 4.80 g/dL    ALT (SGPT) 29 21 - 72 U/L    AST (SGOT) 26 17 - 59 U/L    Alkaline Phosphatase 110 38 - 126 U/L    Total Bilirubin 0.4 0.2 - 1.3 mg/dL    eGFR Non  Amer 102 (H) 42 - 98 mL/min/1.73    Globulin 2.7 2.3 - 3.5 gm/dL    A/G Ratio 1.4 1.1 - 1.8 g/dL    BUN/Creatinine Ratio 14.9 7.0 - 25.0    Anion Gap 13.0 5.0 - 15.0 mmol/L   Results for orders placed or performed in visit on 12/08/17   Iron and TIBC    Specimen: Blood   Result Value Ref Range    Iron 82 49 - 181 mcg/dL    TIBC 378 261 - 462 mcg/dL    Iron Saturation 22 20 - 55 %   Folate    Specimen: Blood   Result Value Ref Range    Folate >20.00 2.76 - 21.00 ng/mL   Ferritin    Specimen: Blood   Result Value Ref Range    Ferritin 55.30 17.90 - 464.00 ng/mL   Vitamin B12    Specimen: Blood   Result Value Ref Range    Vitamin B-12 298 239 - 931 pg/mL   Results for orders placed or performed in visit on 12/08/17   CBC Auto Differential    Specimen: Blood   Result Value Ref Range    WBC 5.61 3.20 - 9.80 10*3/mm3    RBC 4.14 (L) 4.37 - 5.74 10*6/mm3    Hemoglobin 12.1 (L) 13.7 - 17.3 g/dL    Hematocrit 34.5 (L) 39.0 - 49.0 %    MCV 83.3 80.0 - 98.0 fL    MCH 29.2 26.5 - 34.0 pg    MCHC 35.1 31.5 - 36.3 g/dL    RDW 12.7 11.5 - 14.5 %    RDW-SD 38.5 35.1 - 43.9 fl    MPV 9.6 8.0 - 12.0 fL    Platelets 172 150 - 450 10*3/mm3    Neutrophil % 66.4 37.0 - 80.0 %    Lymphocyte % 18.5 10.0 - 50.0 %    Monocyte % 9.6 0.0 - 12.0 %    Eosinophil % 4.8 0.0 - 7.0 %    Basophil % 0.5 0.0 - 2.0 %    Immature Grans % 0.2 0.0 - 0.5 %    Neutrophils, Absolute 3.72 2.00 - 8.60 10*3/mm3    Lymphocytes, Absolute 1.04 0.60 - 4.20 10*3/mm3    Monocytes, Absolute 0.54 0.00 -  0.90 10*3/mm3    Eosinophils, Absolute 0.27 0.00 - 0.70 10*3/mm3    Basophils, Absolute 0.03 0.00 - 0.20 10*3/mm3    Immature Grans, Absolute 0.01 0.00 - 0.02 10*3/mm3   Lactate Dehydrogenase    Specimen: Blood   Result Value Ref Range     313 - 618 U/L   Comprehensive Metabolic Panel    Specimen: Blood   Result Value Ref Range    Glucose 87 60 - 100 mg/dL    BUN 11 7 - 21 mg/dL    Creatinine 0.85 0.70 - 1.30 mg/dL    Sodium 137 137 - 145 mmol/L    Potassium 4.3 3.5 - 5.1 mmol/L    Chloride 101 95 - 110 mmol/L    CO2 28.0 22.0 - 31.0 mmol/L    Calcium 9.4 8.4 - 10.2 mg/dL    Total Protein 6.7 6.3 - 8.6 g/dL    Albumin 4.00 3.40 - 4.80 g/dL    ALT (SGPT) 32 21 - 72 U/L    AST (SGOT) 33 17 - 59 U/L    Alkaline Phosphatase 101 38 - 126 U/L    Total Bilirubin 0.5 0.2 - 1.3 mg/dL    eGFR Non African Amer 87 42 - 98 mL/min/1.73    Globulin 2.7 2.3 - 3.5 gm/dL    A/G Ratio 1.5 1.1 - 1.8 g/dL    BUN/Creatinine Ratio 12.9 7.0 - 25.0    Anion Gap 8.0 5.0 - 15.0 mmol/L     *Note: Due to a large number of results and/or encounters for the requested time period, some results have not been displayed. A complete set of results can be found in Results Review.

## 2022-05-03 NOTE — ANESTHESIA POSTPROCEDURE EVALUATION
Patient: Sharan Steiner    Procedure Summary     Date: 05/03/22 Room / Location: [facility] ENDOSCOPY 3 / [facility] ENDOSCOPY    Anesthesia Start: 1340 Anesthesia Stop: 1408    Procedure: ESOPHAGOGASTRODUODENOSCOPY (N/A ) Diagnosis:       Other dysphagia      (Other dysphagia [R13.19])    Surgeons: Jeff Crabtree MD Provider: Manuel Conde CRNA    Anesthesia Type: MAC ASA Status: 3          Anesthesia Type: MAC    Vitals  No vitals data found for the desired time range.          Post Anesthesia Care and Evaluation    Patient location during evaluation: PHASE II  Patient participation: complete - patient cannot participate  Level of consciousness: sleepy but conscious  Pain score: 0  Pain management: adequate  Airway patency: patent  Anesthetic complications: No anesthetic complications  PONV Status: none  Cardiovascular status: acceptable  Respiratory status: acceptable  Hydration status: acceptable  No anesthesia care post op

## 2022-05-25 ENCOUNTER — OFFICE VISIT (OUTPATIENT)
Dept: GASTROENTEROLOGY | Facility: CLINIC | Age: 82
End: 2022-05-25

## 2022-05-25 VITALS
HEIGHT: 69 IN | DIASTOLIC BLOOD PRESSURE: 82 MMHG | SYSTOLIC BLOOD PRESSURE: 120 MMHG | HEART RATE: 84 BPM | BODY MASS INDEX: 27.34 KG/M2 | WEIGHT: 184.6 LBS

## 2022-05-25 DIAGNOSIS — K21.00 GASTROESOPHAGEAL REFLUX DISEASE WITH ESOPHAGITIS WITHOUT HEMORRHAGE: Primary | ICD-10-CM

## 2022-05-25 DIAGNOSIS — K22.2 STRICTURE AND STENOSIS OF ESOPHAGUS: ICD-10-CM

## 2022-05-25 PROBLEM — R42 VERTIGO: Status: ACTIVE | Noted: 2022-05-25

## 2022-05-25 PROBLEM — N40.0 BENIGN PROSTATIC HYPERPLASIA WITHOUT URINARY OBSTRUCTION: Status: ACTIVE | Noted: 2022-05-25

## 2022-05-25 PROBLEM — M15.9 GENERALIZED OSTEOARTHRITIS: Status: ACTIVE | Noted: 2022-05-25

## 2022-05-25 PROBLEM — J30.9 ALLERGIC RHINITIS: Status: ACTIVE | Noted: 2022-05-25

## 2022-05-25 PROCEDURE — 99213 OFFICE O/P EST LOW 20 MIN: CPT | Performed by: NURSE PRACTITIONER

## 2022-05-25 NOTE — PROGRESS NOTES
Chief Complaint   Patient presents with   • Heartburn   • Difficulty Swallowing       Subjective    Sharan Steiner is a 81 y.o. male. he is here today for follow-up.  81-year-old male presents for follow-up after EGD.  States since dilation and injection of Botox symptoms have been night and -day difference swallowing has greatly improved denies any recent choking episodes or any issues since procedure reflux is well controlled with Nexium.    Heartburn  He complains of heartburn. He reports no abdominal pain, no belching, no chest pain, no choking, no coughing, no dysphagia, no early satiety, no globus sensation, no hoarse voice, no nausea or no sore throat. This is a chronic problem. The problem occurs frequently. The problem has been waxing and waning. The heartburn does not wake him from sleep. The heartburn does not limit his activity. The heartburn doesn't change with position. The symptoms are aggravated by certain foods. Pertinent negatives include no fatigue.   Difficulty Swallowing  Pertinent negatives include no abdominal pain, arthralgias, chest pain, chills, coughing, diaphoresis, fatigue, fever, nausea, sore throat or vomiting.            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   • Seizures (HCC)      Past Surgical History:   Procedure Laterality Date   • BACK SURGERY     • COLONOSCOPY  01/13/2017   • ENDOSCOPY N/A 07/17/2019    Procedure: ESOPHAGOGASTRODUODENOSCOPY possible dilation;  Surgeon: Jeff Crabtree MD;  Location: [facility] ENDOSCOPY;  Service: Gastroenterology   • ENDOSCOPY N/A 12/09/2019    Procedure: ESOPHAGOGASTRODUODENOSCOPY possible dilation;  Surgeon: Jeff Crabtree MD;  Location:  [facility] ENDOSCOPY;  Service: Gastroenterology   • ENDOSCOPY N/A 02/10/2021    Procedure: ESOPHAGOGASTRODUODENOSCOPY possible dilation Monday appt;  Surgeon: Jeff Crabtree MD;  Location: [facility] ENDOSCOPY;  Service: Gastroenterology;  Laterality: N/A;  savory dilation 48-54 fr   • ENDOSCOPY N/A 05/26/2021    Procedure: ESOPHAGOGASTRODUODENOSCOPY possible dilation;  Surgeon: Jeff Crabtree MD;  Location: [facility] ENDOSCOPY;  Service: Gastroenterology;  Laterality: N/A;   • ENDOSCOPY N/A 09/29/2021    Procedure: ESOPHAGOGASTRODUODENOSCOPY possible dilation;  Surgeon: Jeff Crabtree MD;  Location: [facility] ENDOSCOPY;  Service: Gastroenterology;  Laterality: N/A;   • ENDOSCOPY N/A 4/25/2022    Procedure: ESOPHAGOGASTRODUODENOSCOPY possible dilation;  Surgeon: Jeff Crabtree MD;  Location: [facility] ENDOSCOPY;  Service: Gastroenterology;  Laterality: N/A;   • ENDOSCOPY N/A 5/3/2022    Procedure: ESOPHAGOGASTRODUODENOSCOPY;  Surgeon: Jeff Crabtree MD;  Location: [facility] ENDOSCOPY;  Service: Gastroenterology;  Laterality: N/A;   • ORCHIECTOMY     • UPPER GASTROINTESTINAL ENDOSCOPY  02/10/2021   • UPPER GASTROINTESTINAL ENDOSCOPY  09/29/2021     Family History   Problem Relation Age of Onset   • Diabetes Mother    • Hypertension Sister    • Cancer Sister    • Hypertension Brother    • Cancer Brother        Prior to Admission medications    Medication Sig Start Date End Date Taking? Authorizing Provider   aspirin 81 MG chewable tablet Chew 81 mg Daily.    Monique Melendez MD   atenolol (TENORMIN) 50 MG tablet Take 50 mg by mouth Daily. 12/13/16   Monique Melendez MD   Brealona Ellipta 100-25 MCG/INH inhaler 1 puff Daily. 9/15/21   Monique Melendez MD   cetirizine (zyrTEC) 10 MG tablet Take 10 mg by mouth Daily.    Monique Melendez MD   doxazosin (CARDURA) 4 MG tablet Take 4 mg by mouth Daily.    Monique Melendez MD   esomeprazole (nexIUM) 40 MG capsule Take 1 capsule by mouth Daily. 7/15/21   Nury Ortega  A, APRN   fexofenadine (ALLEGRA) 180 MG tablet Take 180 mg by mouth Daily.    ProviderMonique MD   fluticasone (FLONASE) 50 MCG/ACT nasal spray 2 sprays into the nostril(s) as directed by provider 2 (Two) Times a Day. Administer 2 sprays in each nostril for each dose. 3/2/16   Monique Melendez MD   losartan (COZAAR) 100 MG tablet Take 100 mg by mouth Daily. 21   Monique Melendez MD   valproic acid (DEPAKENE) 250 MG capsule Take 250 mg by mouth. 9/9/21 3/9/22  Monique Melendez MD   VENTOLIN  (90 Base) MCG/ACT inhaler Inhale 2 puffs Every 6 (Six) Hours As Needed. 19   Monique Melendez MD     Allergies   Allergen Reactions   • Levofloxacin Other (See Comments)     Seizures   • Penicillins Anaphylaxis   • Tramadol Other (See Comments)     Seizures   • Crestor [Rosuvastatin Calcium] Other (See Comments)     Rhabdo   • Sulfa Antibiotics Rash     Social History     Socioeconomic History   • Marital status:    Tobacco Use   • Smoking status: Former Smoker     Types: Cigarettes     Quit date:      Years since quittin.4   • Smokeless tobacco: Never Used   • Tobacco comment: passive   Vaping Use   • Vaping Use: Never used   Substance and Sexual Activity   • Alcohol use: Not Currently     Comment: none in 40 years   • Drug use: Never   • Sexual activity: Defer       Review of Systems  Review of Systems   Constitutional: Negative for activity change, appetite change, chills, diaphoresis, fatigue, fever and unexpected weight change.   HENT: Negative for hoarse voice, sore throat and trouble swallowing.    Respiratory: Negative for cough, choking and shortness of breath.    Cardiovascular: Negative for chest pain.   Gastrointestinal: Positive for heartburn. Negative for abdominal distention, abdominal pain, anal bleeding, blood in stool, constipation, diarrhea, dysphagia, nausea, rectal pain and vomiting.   Musculoskeletal: Negative for arthralgias.   Skin: Negative for  "pallor.   Neurological: Negative for light-headedness.        /82 (BP Location: Left arm)   Pulse 84   Ht 175.3 cm (69\")   Wt 83.7 kg (184 lb 9.6 oz)   BMI 27.26 kg/m²     Objective    Physical Exam  Constitutional:       General: He is not in acute distress.     Appearance: Normal appearance. He is normal weight. He is not ill-appearing.   HENT:      Head: Normocephalic and atraumatic.   Pulmonary:      Effort: Pulmonary effort is normal.   Abdominal:      General: Abdomen is flat. Bowel sounds are normal. There is no distension.      Palpations: Abdomen is soft. There is no mass.      Tenderness: There is no abdominal tenderness.   Neurological:      Mental Status: He is alert.       Lab on 05/02/2022   Component Date Value Ref Range Status   • COVID19 05/02/2022 Not Detected  Not Detected - Ref. Range Final     Assessment & Plan      1. Gastroesophageal reflux disease with esophagitis without hemorrhage    2. Stricture and stenosis of esophagus    .   Continue Nexium daily follow-up in 3 months for recheck return office sooner if needed    Orders placed during this encounter include:  No orders of the defined types were placed in this encounter.      * Surgery not found *    Review and/or summary of lab tests, radiology, procedures, medications. Review and summary of old records and obtaining of history. The risks and benefits of my recommendations, as well as other treatment options were discussed with the patient today. Questions were answered.    No orders of the defined types were placed in this encounter.      Follow-up: Return in about 3 months (around 8/25/2022) for Recheck.          This document has been electronically signed by OSEI Harper on May 25, 2022 10:09 CDT           I spent 18 minutes caring for Sharan on this date of service. This time includes time spent by me in the following activities:preparing for the visit, reviewing tests, obtaining and/or reviewing a separately " obtained history, performing a medically appropriate examination and/or evaluation , counseling and educating the patient/family/caregiver, ordering medications, tests, or procedures, referring and communicating with other health care professionals , documenting information in the medical record and care coordination    Results for orders placed or performed in visit on 05/02/22   COVID-19,  [facility] IN-HOUSE, NP SWAB IN TRANSPORT MEDIA 8-10 HR TAT - Swab, Nasopharynx    Specimen: Nasopharynx; Swab   Result Value Ref Range    COVID19 Not Detected Not Detected - Ref. Range   Results for orders placed or performed in visit on 04/22/22   COVID-19,  [facility]-HOUSE, NP SWAB IN TRANSPORT MEDIA 8-10 HR TAT - Swab, Nasopharynx    Specimen: Nasopharynx; Swab   Result Value Ref Range    COVID19 Not Detected Not Detected - Ref. Range   Results for orders placed or performed in visit on 09/27/21   COVID-19,  [facility] IN-HOUSE, NP SWAB IN TRANSPORT MEDIA 8-10 HR TAT - Swab, Nasopharynx    Specimen: Nasopharynx; Swab   Result Value Ref Range    COVID19 Not Detected Not Detected - Ref. Range   Results for orders placed or performed in visit on 05/25/21   COVID-19,  [facility], NP SWAB IN TRANSPORT MEDIA 8-10 HR TAT - Swab, Nasopharynx    Specimen: Nasopharynx; Swab   Result Value Ref Range    COVID19 Not Detected Not Detected - Ref. Range   Results for orders placed or performed in visit on 02/07/21   COVID-19,[facility] IN-[facility], NP/OP SWAB IN UTM/VTM/SALINE TRANSPORT MEDIA,24 HR TAT - Swab, Nasopharynx    Specimen: Nasopharynx; Swab   Result Value Ref Range    COVID19 Not Detected Not Detected - Ref. Range   Results for orders placed or performed during the hospital encounter of 07/17/19   Tissue Pathology Exam    Specimen: A: Gastric, Antrum; Tissue    B: Esophagus, Distal; Tissue   Result Value Ref Range    Case Report       Surgical Pathology Report                         Case: [id number]                                   Authorizing Provider:  Jeff Crabtree MD        Collected:           07/17/2019 05:30 PM          Ordering Location:     [facility]             Received:            07/18/2019 06:14 AM                                 [facility]                                                     Pathologist:           Domenic Espino MD                                                        Specimens:   1) - Gastric, Antrum, antrum  cold bx                                                               2) - Esophagus, Distal, distal esophagus   cold bx                                         Final Diagnosis       1.  GASTRIC ANTRUM, MUCOSAL BIOPSY:  MILD CHRONIC GASTRITIS.  NO EVIDENCE OF HELICOBACTER PYLORI.    2.  DISTAL ESOPHAGUS, MUCOSAL BIOPSY:  SEGMENTS OF MILDLY INFLAMED STRATIFIED SQUAMOUS EPITHELIUM.      Gross Description       In 2 containers, each of these show mucosal biopsies measuring up to 0.3 cm in greatest dimension.  Embedded accordingly.  1A antrum; 2A distal esophagus.     Results for orders placed or performed in visit on 03/13/18   Tissue Pathology Exam    Specimen: Clavicle, Right; Hardware / Foreign Body   Result Value Ref Range    Case Report       Surgical Pathology Report                         Case: [id number]                                  Authorizing Provider:  Elroy Lucas MD Collected:           03/13/2018 03:35 PM          Ordering Location:     [facility]     Received:            03/14/2018 07:48 AM                                 [facility]                                                        Pathologist:           Domenic Espino MD                                                         Specimen:    Clavicle, Right, Mediport                                                                  Clinical Information       Removal of Mediport right clavicular area.      Final Diagnosis       OLD MEDIPORT.      Gross Description        The specimen consists of a Mediport with a 25.0 cm rubber lead.      Embedded Images     Results for orders placed or performed in visit on 03/09/18   CBC Auto Differential    Specimen: Blood   Result Value Ref Range    WBC 6.86 3.20 - 9.80 10*3/mm3    RBC 4.28 (L) 4.37 - 5.74 10*6/mm3    Hemoglobin 12.2 (L) 13.7 - 17.3 g/dL    Hematocrit 35.7 (L) 39.0 - 49.0 %    MCV 83.4 80.0 - 98.0 fL    MCH 28.5 26.5 - 34.0 pg    MCHC 34.2 31.5 - 36.3 g/dL    RDW 12.4 11.5 - 14.5 %    RDW-SD 37.6 35.1 - 43.9 fl    MPV 9.8 8.0 - 12.0 fL    Platelets 185 150 - 450 10*3/mm3    Neutrophil % 69.1 37.0 - 80.0 %    Lymphocyte % 10.6 10.0 - 50.0 %    Monocyte % 12.7 (H) 0.0 - 12.0 %    Eosinophil % 6.6 0.0 - 7.0 %    Basophil % 0.6 0.0 - 2.0 %    Immature Grans % 0.4 0.0 - 0.5 %    Neutrophils, Absolute 4.74 2.00 - 8.60 10*3/mm3    Lymphocytes, Absolute 0.73 0.60 - 4.20 10*3/mm3    Monocytes, Absolute 0.87 0.00 - 0.90 10*3/mm3    Eosinophils, Absolute 0.45 0.00 - 0.70 10*3/mm3    Basophils, Absolute 0.04 0.00 - 0.20 10*3/mm3    Immature Grans, Absolute 0.03 (H) 0.00 - 0.02 10*3/mm3   Lactate Dehydrogenase    Specimen: Blood   Result Value Ref Range     313 - 618 U/L   Comprehensive Metabolic Panel    Specimen: Blood   Result Value Ref Range    Glucose 94 60 - 100 mg/dL    BUN 11 7 - 21 mg/dL    Creatinine 0.74 0.70 - 1.30 mg/dL    Sodium 137 137 - 145 mmol/L    Potassium 4.5 3.5 - 5.1 mmol/L    Chloride 97 95 - 110 mmol/L    CO2 27.0 22.0 - 31.0 mmol/L    Calcium 9.0 8.4 - 10.2 mg/dL    Total Protein 6.6 6.3 - 8.6 g/dL    Albumin 3.90 3.40 - 4.80 g/dL    ALT (SGPT) 29 21 - 72 U/L    AST (SGOT) 26 17 - 59 U/L    Alkaline Phosphatase 110 38 - 126 U/L    Total Bilirubin 0.4 0.2 - 1.3 mg/dL    eGFR Non  Amer 102 (H) 42 - 98 mL/min/1.73    Globulin 2.7 2.3 - 3.5 gm/dL    A/G Ratio 1.4 1.1 - 1.8 g/dL    BUN/Creatinine Ratio 14.9 7.0 - 25.0    Anion Gap 13.0 5.0 - 15.0 mmol/L   Results for orders placed or performed in visit on  12/08/17   Iron and TIBC    Specimen: Blood   Result Value Ref Range    Iron 82 49 - 181 mcg/dL    TIBC 378 261 - 462 mcg/dL    Iron Saturation 22 20 - 55 %   Folate    Specimen: Blood   Result Value Ref Range    Folate >20.00 2.76 - 21.00 ng/mL   Ferritin    Specimen: Blood   Result Value Ref Range    Ferritin 55.30 17.90 - 464.00 ng/mL   Vitamin B12    Specimen: Blood   Result Value Ref Range    Vitamin B-12 298 239 - 931 pg/mL   Results for orders placed or performed in visit on 12/08/17   CBC Auto Differential    Specimen: Blood   Result Value Ref Range    WBC 5.61 3.20 - 9.80 10*3/mm3    RBC 4.14 (L) 4.37 - 5.74 10*6/mm3    Hemoglobin 12.1 (L) 13.7 - 17.3 g/dL    Hematocrit 34.5 (L) 39.0 - 49.0 %    MCV 83.3 80.0 - 98.0 fL    MCH 29.2 26.5 - 34.0 pg    MCHC 35.1 31.5 - 36.3 g/dL    RDW 12.7 11.5 - 14.5 %    RDW-SD 38.5 35.1 - 43.9 fl    MPV 9.6 8.0 - 12.0 fL    Platelets 172 150 - 450 10*3/mm3    Neutrophil % 66.4 37.0 - 80.0 %    Lymphocyte % 18.5 10.0 - 50.0 %    Monocyte % 9.6 0.0 - 12.0 %    Eosinophil % 4.8 0.0 - 7.0 %    Basophil % 0.5 0.0 - 2.0 %    Immature Grans % 0.2 0.0 - 0.5 %    Neutrophils, Absolute 3.72 2.00 - 8.60 10*3/mm3    Lymphocytes, Absolute 1.04 0.60 - 4.20 10*3/mm3    Monocytes, Absolute 0.54 0.00 - 0.90 10*3/mm3    Eosinophils, Absolute 0.27 0.00 - 0.70 10*3/mm3    Basophils, Absolute 0.03 0.00 - 0.20 10*3/mm3    Immature Grans, Absolute 0.01 0.00 - 0.02 10*3/mm3   Lactate Dehydrogenase    Specimen: Blood   Result Value Ref Range     313 - 618 U/L   Comprehensive Metabolic Panel    Specimen: Blood   Result Value Ref Range    Glucose 87 60 - 100 mg/dL    BUN 11 7 - 21 mg/dL    Creatinine 0.85 0.70 - 1.30 mg/dL    Sodium 137 137 - 145 mmol/L    Potassium 4.3 3.5 - 5.1 mmol/L    Chloride 101 95 - 110 mmol/L    CO2 28.0 22.0 - 31.0 mmol/L    Calcium 9.4 8.4 - 10.2 mg/dL    Total Protein 6.7 6.3 - 8.6 g/dL    Albumin 4.00 3.40 - 4.80 g/dL    ALT (SGPT) 32 21 - 72 U/L    AST (SGOT) 33  17 - 59 U/L    Alkaline Phosphatase 101 38 - 126 U/L    Total Bilirubin 0.5 0.2 - 1.3 mg/dL    eGFR Non African Amer 87 42 - 98 mL/min/1.73    Globulin 2.7 2.3 - 3.5 gm/dL    A/G Ratio 1.5 1.1 - 1.8 g/dL    BUN/Creatinine Ratio 12.9 7.0 - 25.0    Anion Gap 8.0 5.0 - 15.0 mmol/L     *Note: Due to a large number of results and/or encounters for the requested time period, some results have not been displayed. A complete set of results can be found in Results Review.

## 2022-06-20 RX ORDER — ESOMEPRAZOLE MAGNESIUM 40 MG/1
CAPSULE, DELAYED RELEASE ORAL
Qty: 90 CAPSULE | Refills: 1 | Status: SHIPPED | OUTPATIENT
Start: 2022-06-20

## 2022-09-07 ENCOUNTER — OFFICE VISIT (OUTPATIENT)
Dept: GASTROENTEROLOGY | Facility: CLINIC | Age: 82
End: 2022-09-07

## 2022-09-07 VITALS
HEART RATE: 59 BPM | DIASTOLIC BLOOD PRESSURE: 88 MMHG | HEIGHT: 69 IN | BODY MASS INDEX: 28.85 KG/M2 | SYSTOLIC BLOOD PRESSURE: 170 MMHG | WEIGHT: 194.8 LBS

## 2022-09-07 DIAGNOSIS — K22.4 ESOPHAGEAL MOTILITY DISORDER: ICD-10-CM

## 2022-09-07 DIAGNOSIS — K21.00 GASTROESOPHAGEAL REFLUX DISEASE WITH ESOPHAGITIS WITHOUT HEMORRHAGE: Primary | ICD-10-CM

## 2022-09-07 PROCEDURE — 99213 OFFICE O/P EST LOW 20 MIN: CPT | Performed by: NURSE PRACTITIONER

## 2022-09-07 NOTE — PROGRESS NOTES
Chief Complaint   Patient presents with   • Heartburn   • Difficulty Swallowing       Subjective    Sharan Steiner is a 82 y.o. male. he is here today for follow-up.    History of Present Illness  82-year-old male presents with his daughter to discuss reflux and dysphagia.  They report since he had dilation with injection of Botox(5/3/22)  symptoms have greatly improved denies any further dysphagia at this time denies any cough or excessive mucus with intake which was previously an issue before he has been eating very well gained 10 pounds since May.  Denies any reflux symptoms as long as he takes his Nexium daily.  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   • Seizures (HCC)      Past Surgical History:   Procedure Laterality Date   • BACK SURGERY     • COLONOSCOPY  01/13/2017   • ENDOSCOPY N/A 07/17/2019    Procedure: ESOPHAGOGASTRODUODENOSCOPY possible dilation;  Surgeon: Jeff Crabtree MD;  Location: [facility] ENDOSCOPY;  Service: Gastroenterology   • ENDOSCOPY N/A 12/09/2019    Procedure: ESOPHAGOGASTRODUODENOSCOPY possible dilation;  Surgeon: Jeff Crabtree MD;  Location: [facility] ENDOSCOPY;  Service: Gastroenterology   • ENDOSCOPY N/A 02/10/2021    Procedure: ESOPHAGOGASTRODUODENOSCOPY possible dilation Monday appt;  Surgeon: Jeff Crabtree MD;  Location: [facility] ENDOSCOPY;  Service: Gastroenterology;  Laterality: N/A;  savory dilation 48-54 fr   • ENDOSCOPY N/A 05/26/2021    Procedure: ESOPHAGOGASTRODUODENOSCOPY possible dilation;  Surgeon: Jeff Crabtree MD;  Location: [facility]  ENDOSCOPY;  Service: Gastroenterology;  Laterality: N/A;   • ENDOSCOPY N/A 09/29/2021    Procedure: ESOPHAGOGASTRODUODENOSCOPY possible dilation;  Surgeon: Jeff Crabtree MD;  Location: [facility] ENDOSCOPY;  Service: Gastroenterology;  Laterality: N/A;   • ENDOSCOPY N/A 4/25/2022    Procedure: ESOPHAGOGASTRODUODENOSCOPY possible dilation;  Surgeon: Jeff Crabtree MD;  Location: [facility] ENDOSCOPY;  Service: Gastroenterology;  Laterality: N/A;   • ENDOSCOPY N/A 5/3/2022    Procedure: ESOPHAGOGASTRODUODENOSCOPY;  Surgeon: Jeff Crabtree MD;  Location: [facility] ENDOSCOPY;  Service: Gastroenterology;  Laterality: N/A;   • ORCHIECTOMY     • UPPER GASTROINTESTINAL ENDOSCOPY  02/10/2021   • UPPER GASTROINTESTINAL ENDOSCOPY  09/29/2021     Family History   Problem Relation Age of Onset   • Diabetes Mother    • Hypertension Sister    • Cancer Sister    • Hypertension Brother    • Cancer Brother        Prior to Admission medications    Medication Sig Start Date End Date Taking? Authorizing Provider   aspirin 81 MG chewable tablet Chew 81 mg Daily.   Yes Monique Melendez MD   atenolol (TENORMIN) 50 MG tablet Take 50 mg by mouth Daily. 12/13/16  Yes Monique Melendez MD   Breo Ellipta 100-25 MCG/INH inhaler 1 puff Daily. 9/15/21  Yes Monique Melendez MD   cetirizine (zyrTEC) 10 MG tablet Take 10 mg by mouth Daily.   Yes Monique Melendez MD   doxazosin (CARDURA) 4 MG tablet Take 4 mg by mouth Daily.   Yes Monique Melendez MD   esomeprazole (nexIUM) 40 MG capsule TAKE 1 CAPSULE BY MOUTH EVERY DAY 6/20/22  Yes Nury Ortega APRN   fexofenadine (ALLEGRA) 180 MG tablet Take 180 mg by mouth Daily.   Yes Monique Melendez MD   fluticasone (FLONASE) 50 MCG/ACT nasal spray 2 sprays into the nostril(s) as directed by provider 2 (Two) Times a Day. Administer 2 sprays in each nostril for each dose. 3/2/16  Yes Monique Melendez MD   losartan (COZAAR) 100 MG tablet Take 100 mg by mouth Daily. 1/5/21  Yes  "ProviderMonique MD   VENTOLIN  (90 Base) MCG/ACT inhaler Inhale 2 puffs Every 6 (Six) Hours As Needed. 19  Yes Monique Melendez MD   valproic acid (DEPAKENE) 250 MG capsule Take 250 mg by mouth. 9/9/21 3/9/22  Monique Melendez MD     Allergies   Allergen Reactions   • Levofloxacin Other (See Comments)     Seizures   • Penicillins Anaphylaxis   • Tramadol Other (See Comments)     Seizures   • Crestor [Rosuvastatin Calcium] Other (See Comments)     Rhabdo   • Sulfa Antibiotics Rash     Social History     Socioeconomic History   • Marital status:    Tobacco Use   • Smoking status: Former Smoker     Types: Cigarettes     Quit date:      Years since quittin.7   • Smokeless tobacco: Never Used   • Tobacco comment: passive   Vaping Use   • Vaping Use: Never used   Substance and Sexual Activity   • Alcohol use: Not Currently     Comment: none in 40 years   • Drug use: Never   • Sexual activity: Defer       Review of Systems  Review of Systems   Constitutional: Positive for fatigue. Negative for activity change, appetite change, chills, diaphoresis, fever and unexpected weight change.   HENT: Positive for trouble swallowing (much better since botox ). Negative for sore throat.    Respiratory: Negative for shortness of breath.    Gastrointestinal: Negative for abdominal distention, abdominal pain, anal bleeding, blood in stool, constipation, diarrhea, nausea, rectal pain and vomiting.   Musculoskeletal: Negative for arthralgias.   Skin: Negative for pallor.   Neurological: Negative for light-headedness.        /88 (BP Location: Right arm)   Pulse 59   Ht 175.3 cm (69\")   Wt 88.4 kg (194 lb 12.8 oz)   BMI 28.77 kg/m²     Objective    Physical Exam  Constitutional:       General: He is not in acute distress.     Appearance: Normal appearance. He is normal weight. He is not ill-appearing.   HENT:      Head: Normocephalic and atraumatic.   Pulmonary:      Effort: Pulmonary " effort is normal.   Abdominal:      General: Abdomen is flat. Bowel sounds are normal. There is no distension.      Palpations: Abdomen is soft. There is no mass.      Tenderness: There is no abdominal tenderness.   Neurological:      Mental Status: He is alert.       Lab on 05/02/2022   Component Date Value Ref Range Status   • COVID19 05/02/2022 Not Detected  Not Detected - Ref. Range Final     Assessment & Plan      1. Gastroesophageal reflux disease with esophagitis without hemorrhage    2. Esophageal motility disorder    .       Orders placed during this encounter include:  No orders of the defined types were placed in this encounter.      * Surgery not found *    Review and/or summary of lab tests, radiology, procedures, medications. Review and summary of old records and obtaining of history. The risks and benefits of my recommendations, as well as other treatment options were discussed with the patient today. Questions were answered.    No orders of the defined types were placed in this encounter.      Follow-up: Return in about 3 months (around 12/7/2022) for Recheck.          This document has been electronically signed by OSEI Harper on September 7, 2022 09:50 CDT           I spent 17 minutes caring for Sharan on this date of service. This time includes time spent by me in the following activities:preparing for the visit, reviewing tests, obtaining and/or reviewing a separately obtained history, performing a medically appropriate examination and/or evaluation , counseling and educating the patient/family/caregiver, ordering medications, tests, or procedures, referring and communicating with other health care professionals , documenting information in the medical record and care coordination    Results for orders placed or performed in visit on 05/02/22   COVID-19, [facility] IN-HOUSE, NP SWAB IN TRANSPORT MEDIA 8-10 HR TAT - Swab, Nasopharynx    Specimen: Nasopharynx; Swab   Result Value Ref Range     COVID19 Not Detected Not Detected - Ref. Range   Results for orders placed or performed in visit on 04/22/22   COVID-19,  [facility] IN-HOUSE, NP SWAB IN TRANSPORT MEDIA 8-10 HR TAT - Swab, Nasopharynx    Specimen: Nasopharynx; Swab   Result Value Ref Range    COVID19 Not Detected Not Detected - Ref. Range   Results for orders placed or performed in visit on 09/27/21   COVID-19,  [facility] IN-HOUSE, NP SWAB IN TRANSPORT MEDIA 8-10 HR TAT - Swab, Nasopharynx    Specimen: Nasopharynx; Swab   Result Value Ref Range    COVID19 Not Detected Not Detected - Ref. Range   Results for orders placed or performed in visit on 05/25/21   COVID-19,  [facility] IN-HOUSE, NP SWAB IN TRANSPORT MEDIA 8-10 HR TAT - Swab, Nasopharynx    Specimen: Nasopharynx; Swab   Result Value Ref Range    COVID19 Not Detected Not Detected - Ref. Range   Results for orders placed or performed in visit on 02/07/21   COVID-19,[facility] IN-HOUSE, NP/OP SWAB IN UTM/VTM/SALINE TRANSPORT MEDIA,24 HR TAT - Swab, Nasopharynx    Specimen: Nasopharynx; Swab   Result Value Ref Range    COVID19 Not Detected Not Detected - Ref. Range   Results for orders placed or performed during the hospital encounter of 07/17/19   Tissue Pathology Exam    Specimen: A: Gastric, Antrum; Tissue    B: Esophagus, Distal; Tissue   Result Value Ref Range    Case Report       Surgical Pathology Report                         Case: [id number]                                  Authorizing Provider:  Jeff Crabtree MD        Collected:           07/17/2019 05:30 PM          Ordering Location:     [facility]             Received:            07/18/2019 06:14 AM                                 [facility]                                                     Pathologist:           Domenic Espino MD                                                        Specimens:   1) - Gastric, Antrum, antrum  cold bx                                                               2) -  Esophagus, Distal, distal esophagus   cold bx                                         Final Diagnosis       1.  GASTRIC ANTRUM, MUCOSAL BIOPSY:  MILD CHRONIC GASTRITIS.  NO EVIDENCE OF HELICOBACTER PYLORI.    2.  DISTAL ESOPHAGUS, MUCOSAL BIOPSY:  SEGMENTS OF MILDLY INFLAMED STRATIFIED SQUAMOUS EPITHELIUM.      Gross Description       In 2 containers, each of these show mucosal biopsies measuring up to 0.3 cm in greatest dimension.  Embedded accordingly.  1A antrum; 2A distal esophagus.     Results for orders placed or performed in visit on 03/13/18   Tissue Pathology Exam    Specimen: Clavicle, Right; Hardware / Foreign Body   Result Value Ref Range    Case Report       Surgical Pathology Report                         Case: [id number]                                  Authorizing Provider:  Elroy Lucas MD Collected:           03/13/2018 03:35 PM          Ordering Location:     [facility]     Received:            03/14/2018 07:48 AM                                 [facility]                                                        Pathologist:           Domenic Espino MD                                                         Specimen:    Clavicle, Right, Mediport                                                                  Clinical Information       Removal of Mediport right clavicular area.      Final Diagnosis       OLD MEDIPORT.      Gross Description       The specimen consists of a Mediport with a 25.0 cm rubber lead.      Embedded Images     Results for orders placed or performed in visit on 03/09/18   CBC Auto Differential    Specimen: Blood   Result Value Ref Range    WBC 6.86 3.20 - 9.80 10*3/mm3    RBC 4.28 (L) 4.37 - 5.74 10*6/mm3    Hemoglobin 12.2 (L) 13.7 - 17.3 g/dL    Hematocrit 35.7 (L) 39.0 - 49.0 %    MCV 83.4 80.0 - 98.0 fL    MCH 28.5 26.5 - 34.0 pg    MCHC 34.2 31.5 - 36.3 g/dL    RDW 12.4 11.5 - 14.5 %    RDW-SD 37.6 35.1 - 43.9 fl    MPV 9.8 8.0 - 12.0  fL    Platelets 185 150 - 450 10*3/mm3    Neutrophil % 69.1 37.0 - 80.0 %    Lymphocyte % 10.6 10.0 - 50.0 %    Monocyte % 12.7 (H) 0.0 - 12.0 %    Eosinophil % 6.6 0.0 - 7.0 %    Basophil % 0.6 0.0 - 2.0 %    Immature Grans % 0.4 0.0 - 0.5 %    Neutrophils, Absolute 4.74 2.00 - 8.60 10*3/mm3    Lymphocytes, Absolute 0.73 0.60 - 4.20 10*3/mm3    Monocytes, Absolute 0.87 0.00 - 0.90 10*3/mm3    Eosinophils, Absolute 0.45 0.00 - 0.70 10*3/mm3    Basophils, Absolute 0.04 0.00 - 0.20 10*3/mm3    Immature Grans, Absolute 0.03 (H) 0.00 - 0.02 10*3/mm3   Lactate Dehydrogenase    Specimen: Blood   Result Value Ref Range     313 - 618 U/L   Comprehensive Metabolic Panel    Specimen: Blood   Result Value Ref Range    Glucose 94 60 - 100 mg/dL    BUN 11 7 - 21 mg/dL    Creatinine 0.74 0.70 - 1.30 mg/dL    Sodium 137 137 - 145 mmol/L    Potassium 4.5 3.5 - 5.1 mmol/L    Chloride 97 95 - 110 mmol/L    CO2 27.0 22.0 - 31.0 mmol/L    Calcium 9.0 8.4 - 10.2 mg/dL    Total Protein 6.6 6.3 - 8.6 g/dL    Albumin 3.90 3.40 - 4.80 g/dL    ALT (SGPT) 29 21 - 72 U/L    AST (SGOT) 26 17 - 59 U/L    Alkaline Phosphatase 110 38 - 126 U/L    Total Bilirubin 0.4 0.2 - 1.3 mg/dL    eGFR Non  Amer 102 (H) 42 - 98 mL/min/1.73    Globulin 2.7 2.3 - 3.5 gm/dL    A/G Ratio 1.4 1.1 - 1.8 g/dL    BUN/Creatinine Ratio 14.9 7.0 - 25.0    Anion Gap 13.0 5.0 - 15.0 mmol/L   Results for orders placed or performed in visit on 12/08/17   Iron and TIBC    Specimen: Blood   Result Value Ref Range    Iron 82 49 - 181 mcg/dL    TIBC 378 261 - 462 mcg/dL    Iron Saturation 22 20 - 55 %   Folate    Specimen: Blood   Result Value Ref Range    Folate >20.00 2.76 - 21.00 ng/mL   Ferritin    Specimen: Blood   Result Value Ref Range    Ferritin 55.30 17.90 - 464.00 ng/mL   Vitamin B12    Specimen: Blood   Result Value Ref Range    Vitamin B-12 298 239 - 931 pg/mL   Results for orders placed or performed in visit on 12/08/17   CBC Auto Differential     Specimen: Blood   Result Value Ref Range    WBC 5.61 3.20 - 9.80 10*3/mm3    RBC 4.14 (L) 4.37 - 5.74 10*6/mm3    Hemoglobin 12.1 (L) 13.7 - 17.3 g/dL    Hematocrit 34.5 (L) 39.0 - 49.0 %    MCV 83.3 80.0 - 98.0 fL    MCH 29.2 26.5 - 34.0 pg    MCHC 35.1 31.5 - 36.3 g/dL    RDW 12.7 11.5 - 14.5 %    RDW-SD 38.5 35.1 - 43.9 fl    MPV 9.6 8.0 - 12.0 fL    Platelets 172 150 - 450 10*3/mm3    Neutrophil % 66.4 37.0 - 80.0 %    Lymphocyte % 18.5 10.0 - 50.0 %    Monocyte % 9.6 0.0 - 12.0 %    Eosinophil % 4.8 0.0 - 7.0 %    Basophil % 0.5 0.0 - 2.0 %    Immature Grans % 0.2 0.0 - 0.5 %    Neutrophils, Absolute 3.72 2.00 - 8.60 10*3/mm3    Lymphocytes, Absolute 1.04 0.60 - 4.20 10*3/mm3    Monocytes, Absolute 0.54 0.00 - 0.90 10*3/mm3    Eosinophils, Absolute 0.27 0.00 - 0.70 10*3/mm3    Basophils, Absolute 0.03 0.00 - 0.20 10*3/mm3    Immature Grans, Absolute 0.01 0.00 - 0.02 10*3/mm3   Lactate Dehydrogenase    Specimen: Blood   Result Value Ref Range     313 - 618 U/L   Comprehensive Metabolic Panel    Specimen: Blood   Result Value Ref Range    Glucose 87 60 - 100 mg/dL    BUN 11 7 - 21 mg/dL    Creatinine 0.85 0.70 - 1.30 mg/dL    Sodium 137 137 - 145 mmol/L    Potassium 4.3 3.5 - 5.1 mmol/L    Chloride 101 95 - 110 mmol/L    CO2 28.0 22.0 - 31.0 mmol/L    Calcium 9.4 8.4 - 10.2 mg/dL    Total Protein 6.7 6.3 - 8.6 g/dL    Albumin 4.00 3.40 - 4.80 g/dL    ALT (SGPT) 32 21 - 72 U/L    AST (SGOT) 33 17 - 59 U/L    Alkaline Phosphatase 101 38 - 126 U/L    Total Bilirubin 0.5 0.2 - 1.3 mg/dL    eGFR Non African Amer 87 42 - 98 mL/min/1.73    Globulin 2.7 2.3 - 3.5 gm/dL    A/G Ratio 1.5 1.1 - 1.8 g/dL    BUN/Creatinine Ratio 12.9 7.0 - 25.0    Anion Gap 8.0 5.0 - 15.0 mmol/L     *Note: Due to a large number of results and/or encounters for the requested time period, some results have not been displayed. A complete set of results can be found in Results Review.        Provider Procedure Text (C): After obtaining clear surgical margins the defect was repaired by another provider.

## 2022-11-09 ENCOUNTER — OFFICE VISIT (OUTPATIENT)
Dept: GASTROENTEROLOGY | Facility: CLINIC | Age: 82
End: 2022-11-09

## 2022-11-09 VITALS
DIASTOLIC BLOOD PRESSURE: 77 MMHG | WEIGHT: 192 LBS | HEART RATE: 68 BPM | HEIGHT: 69 IN | SYSTOLIC BLOOD PRESSURE: 151 MMHG | BODY MASS INDEX: 28.44 KG/M2

## 2022-11-09 DIAGNOSIS — R13.19 OTHER DYSPHAGIA: ICD-10-CM

## 2022-11-09 DIAGNOSIS — K22.4 ESOPHAGEAL MOTILITY DISORDER: Primary | ICD-10-CM

## 2022-11-09 DIAGNOSIS — K21.00 GASTROESOPHAGEAL REFLUX DISEASE WITH ESOPHAGITIS WITHOUT HEMORRHAGE: ICD-10-CM

## 2022-11-09 PROCEDURE — 99213 OFFICE O/P EST LOW 20 MIN: CPT | Performed by: NURSE PRACTITIONER

## 2022-11-09 RX ORDER — DEXTROSE AND SODIUM CHLORIDE 5; .45 G/100ML; G/100ML
30 INJECTION, SOLUTION INTRAVENOUS CONTINUOUS PRN
Status: CANCELLED | OUTPATIENT
Start: 2022-12-06

## 2022-11-09 NOTE — PROGRESS NOTES
"                                                                                                                  Chief Complaint   Patient presents with   • Difficulty Swallowing       Subjective    Sharan Steiner is a 82 y.o. male. he is here today for follow-up.                                                                  Assessment & Plan                                     1. Esophageal motility disorder    2. Gastroesophageal reflux disease with esophagitis without hemorrhage    3. Other dysphagia      Schedule patient for EGD with dilation and Botox.  Follow-up after test turn office sooner if needed      Follow-up: Return in about 4 weeks (around 12/7/2022) for Recheck.     HPI    82-year-old male presents with his daughter to discuss worsening dysphagia.  In May he underwent EGD with dilation and injection of Botox and symptoms were greatly improved for several months he has been able to gain 21 pounds due to improved swallowing since procedure over the last 2 weeks he has had worsening dysphagia vomiting of ingested intake and mucousy material coming up as occurred previously to Botox injection.  He denies any abdominal pain reports Nexium helps reflux symptoms denies any change in bowel movements or blood in stool recently.    Review of Systems  Review of Systems   Constitutional: Negative for activity change, appetite change, chills, diaphoresis, fatigue, fever and unexpected weight change.   HENT: Positive for trouble swallowing. Negative for sore throat.    Respiratory: Negative for shortness of breath.    Gastrointestinal: Negative for abdominal distention, abdominal pain, anal bleeding, blood in stool, constipation, diarrhea, nausea, rectal pain and vomiting.   Musculoskeletal: Negative for arthralgias.   Skin: Negative for pallor.   Neurological: Negative for light-headedness.       /77 (BP Location: Left arm)   Pulse 68   Ht 175.3 cm (69\")   Wt 87.1 kg (192 lb)   BMI 28.35 kg/m² "     Objective      Physical Exam  Constitutional:       General: He is not in acute distress.     Appearance: Normal appearance. He is normal weight. He is not ill-appearing.   HENT:      Head: Normocephalic and atraumatic.   Pulmonary:      Effort: Pulmonary effort is normal.   Abdominal:      General: Abdomen is flat. Bowel sounds are normal. There is no distension.      Palpations: Abdomen is soft. There is no mass.      Tenderness: There is no abdominal tenderness.   Neurological:      Mental Status: He is alert.               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   • Seizures (HCC)      Past Surgical History:   Procedure Laterality Date   • BACK SURGERY     • COLONOSCOPY  01/13/2017   • ENDOSCOPY N/A 07/17/2019    Procedure: ESOPHAGOGASTRODUODENOSCOPY possible dilation;  Surgeon: Jeff Crabtree MD;  Location: [facility] ENDOSCOPY;  Service: Gastroenterology   • ENDOSCOPY N/A 12/09/2019    Procedure: ESOPHAGOGASTRODUODENOSCOPY possible dilation;  Surgeon: Jeff Crabtree MD;  Location: [facility] ENDOSCOPY;  Service: Gastroenterology   • ENDOSCOPY N/A 02/10/2021    Procedure: ESOPHAGOGASTRODUODENOSCOPY possible dilation Monday appt;  Surgeon: Jeff Crabtree MD;  Location: [facility] ENDOSCOPY;  Service: Gastroenterology;  Laterality: N/A;  savory dilation 48-54 fr   • ENDOSCOPY N/A 05/26/2021    Procedure: ESOPHAGOGASTRODUODENOSCOPY possible dilation;  Surgeon: Jeff Crabtree MD;  Location: [facility] ENDOSCOPY;  Service: Gastroenterology;  Laterality: N/A;   • ENDOSCOPY N/A 09/29/2021    Procedure: ESOPHAGOGASTRODUODENOSCOPY possible dilation;  Surgeon: Jeff Crabtree MD;  Location: [facility] ENDOSCOPY;  Service:  Gastroenterology;  Laterality: N/A;   • ENDOSCOPY N/A 2022    Procedure: ESOPHAGOGASTRODUODENOSCOPY possible dilation;  Surgeon: Jeff Crabtree MD;  Location: [facility] ENDOSCOPY;  Service: Gastroenterology;  Laterality: N/A;   • ENDOSCOPY N/A 5/3/2022    Procedure: ESOPHAGOGASTRODUODENOSCOPY;  Surgeon: Jeff Crabtree MD;  Location: [facility] ENDOSCOPY;  Service: Gastroenterology;  Laterality: N/A;   • ORCHIECTOMY     • UPPER GASTROINTESTINAL ENDOSCOPY  02/10/2021   • UPPER GASTROINTESTINAL ENDOSCOPY  2021     Family History   Problem Relation Age of Onset   • Diabetes Mother    • Hypertension Sister    • Cancer Sister    • Hypertension Brother    • Cancer Brother        Allergies   Allergen Reactions   • Levofloxacin Other (See Comments)     Seizures   • Penicillins Anaphylaxis   • Tramadol Other (See Comments)     Seizures   • Crestor [Rosuvastatin Calcium] Other (See Comments)     Rhabdo   • Sulfa Antibiotics Rash     Social History     Socioeconomic History   • Marital status:    Tobacco Use   • Smoking status: Former     Types: Cigarettes     Quit date:      Years since quittin.8   • Smokeless tobacco: Never   • Tobacco comments:     passive   Vaping Use   • Vaping Use: Never used   Substance and Sexual Activity   • Alcohol use: Not Currently     Comment: none in 40 years   • Drug use: Never   • Sexual activity: Defer     Current Medications:  Prior to Admission medications    Medication Sig Start Date End Date Taking? Authorizing Provider   aspirin 81 MG chewable tablet Chew 81 mg Daily.   Yes Monique Melendez MD   atenolol (TENORMIN) 50 MG tablet Take 50 mg by mouth Daily. 16  Yes Monique Melendez MD   Brealona Ellipta 100-25 MCG/INH inhaler 1 puff Daily. 9/15/21  Yes Monique Melendez MD   cetirizine (zyrTEC) 10 MG tablet Take 10 mg by mouth Daily.   Yes Monique Melendez MD   doxazosin (CARDURA) 4 MG tablet Take 4 mg by mouth Daily.   Yes Monique Melendez MD    esomeprazole (nexIUM) 40 MG capsule TAKE 1 CAPSULE BY MOUTH EVERY DAY 6/20/22  Yes Nury Ortega APRN   fexofenadine (ALLEGRA) 180 MG tablet Take 180 mg by mouth Daily.   Yes Monique Melendez MD   fluticasone (FLONASE) 50 MCG/ACT nasal spray 2 sprays into the nostril(s) as directed by provider 2 (Two) Times a Day. Administer 2 sprays in each nostril for each dose. 3/2/16  Yes Monique Melendez MD   losartan (COZAAR) 100 MG tablet Take 100 mg by mouth Daily. 1/5/21  Yes Monique Melendez MD   VENTOLIN  (90 Base) MCG/ACT inhaler Inhale 2 puffs Every 6 (Six) Hours As Needed. 6/26/19  Yes Monique Melendez MD   valproic acid (DEPAKENE) 250 MG capsule Take 250 mg by mouth. 9/9/21 3/9/22  Monique Melendez MD     Orders placed during this encounter include:  Orders Placed This Encounter   Procedures   • Obtain Informed Consent     Standing Status:   Future     Order Specific Question:   Informed Consent Given For     Answer:   ESOPHAGOGASTRODUODENOSCOPY with botox     ESOPHAGOGASTRODUODENOSCOPY with botox (N/A)  No orders of the defined types were placed in this encounter.        Review and/or summary of lab tests, radiology, procedures, medications. Review and summary of old records and obtaining of history. The risks and benefits of my recommendations, as well as other treatment options were discussed . Any questions/concerned were answered. Patient voiced understanding and agreement.          This document has been electronically signed by OSEI Harper on November 9, 2022 14:57 CST                                               Results for orders placed or performed in visit on 05/02/22   COVID-19, [facility] IN-HOUSE, NP SWAB IN TRANSPORT MEDIA 8-10 HR TAT - Swab, Nasopharynx    Specimen: Nasopharynx; Swab   Result Value Ref Range    COVID19 Not Detected Not Detected - Ref. Range   Results for orders placed or performed in visit on 04/22/22   [facility] IN-HOUSE, NP SWAB IN  TRANSPORT MEDIA 8-10 HR TAT - Swab, Nasopharynx    Specimen: Nasopharynx; Swab   Result Value Ref Range    COVID19 Not Detected Not Detected - Ref. Range   Results for orders placed or performed in visit on 09/27/21   COVID-19, [facility] IN-HOUSE, NP SWAB IN TRANSPORT MEDIA 8-10 HR TAT - Swab, Nasopharynx    Specimen: Nasopharynx; Swab   Result Value Ref Range    COVID19 Not Detected Not Detected - Ref. Range   Results for orders placed or performed in visit on 05/25/21   COVID-19, [facility] IN-HOUSE, NP SWAB IN TRANSPORT MEDIA 8-10 HR TAT - Swab, Nasopharynx    Specimen: Nasopharynx; Swab   Result Value Ref Range    COVID19 Not Detected Not Detected - Ref. Range   Results for orders placed or performed in visit on 02/07/21   COVID-19,[facility] IN-HOUSE, NP/OP SWAB IN UTM/VTM/SALINE TRANSPORT MEDIA,24 HR TAT - Swab, Nasopharynx    Specimen: Nasopharynx; Swab   Result Value Ref Range    COVID19 Not Detected Not Detected - Ref. Range   Results for orders placed or performed during the hospital encounter of 07/17/19   Tissue Pathology Exam    Specimen: A: Gastric, Antrum; Tissue    B: Esophagus, Distal; Tissue   Result Value Ref Range    Case Report       Surgical Pathology Report                         Case: [id number]                                  Authorizing Provider:  Jeff Crabtree MD        Collected:           07/17/2019 05:30 PM          Ordering Location:     [facility]             Received:            07/18/2019 06:14 AM                                 [facility]                                                     Pathologist:           Domenic Espino MD                                                        Specimens:   1) - Gastric, Antrum, antrum  cold bx                                                               2) - Esophagus, Distal, distal esophagus   cold bx                                         Final Diagnosis       1.  GASTRIC ANTRUM, MUCOSAL BIOPSY:  MILD  CHRONIC GASTRITIS.  NO EVIDENCE OF HELICOBACTER PYLORI.    2.  DISTAL ESOPHAGUS, MUCOSAL BIOPSY:  SEGMENTS OF MILDLY INFLAMED STRATIFIED SQUAMOUS EPITHELIUM.      Gross Description       In 2 containers, each of these show mucosal biopsies measuring up to 0.3 cm in greatest dimension.  Embedded accordingly.  1A antrum; 2A distal esophagus.     Results for orders placed or performed in visit on 03/13/18   Tissue Pathology Exam    Specimen: Clavicle, Right; Hardware / Foreign Body   Result Value Ref Range    Case Report       Surgical Pathology Report                         Case: [id number]                                  Authorizing Provider:  Elroy Lucas MD Collected:           03/13/2018 03:35 PM          Ordering Location:     [facility]     Received:            03/14/2018 07:48 AM                                 [facility]                                                        Pathologist:           Domenic Espino MD                                                         Specimen:    Clavicle, Right, Mediport                                                                  Clinical Information       Removal of Mediport right clavicular area.      Final Diagnosis       OLD MEDIPORT.      Gross Description       The specimen consists of a Mediport with a 25.0 cm rubber lead.      Embedded Images     Results for orders placed or performed in visit on 03/09/18   CBC Auto Differential    Specimen: Blood   Result Value Ref Range    WBC 6.86 3.20 - 9.80 10*3/mm3    RBC 4.28 (L) 4.37 - 5.74 10*6/mm3    Hemoglobin 12.2 (L) 13.7 - 17.3 g/dL    Hematocrit 35.7 (L) 39.0 - 49.0 %    MCV 83.4 80.0 - 98.0 fL    MCH 28.5 26.5 - 34.0 pg    MCHC 34.2 31.5 - 36.3 g/dL    RDW 12.4 11.5 - 14.5 %    RDW-SD 37.6 35.1 - 43.9 fl    MPV 9.8 8.0 - 12.0 fL    Platelets 185 150 - 450 10*3/mm3    Neutrophil % 69.1 37.0 - 80.0 %    Lymphocyte % 10.6 10.0 - 50.0 %    Monocyte % 12.7 (H) 0.0 - 12.0 %     Eosinophil % 6.6 0.0 - 7.0 %    Basophil % 0.6 0.0 - 2.0 %    Immature Grans % 0.4 0.0 - 0.5 %    Neutrophils, Absolute 4.74 2.00 - 8.60 10*3/mm3    Lymphocytes, Absolute 0.73 0.60 - 4.20 10*3/mm3    Monocytes, Absolute 0.87 0.00 - 0.90 10*3/mm3    Eosinophils, Absolute 0.45 0.00 - 0.70 10*3/mm3    Basophils, Absolute 0.04 0.00 - 0.20 10*3/mm3    Immature Grans, Absolute 0.03 (H) 0.00 - 0.02 10*3/mm3   Lactate Dehydrogenase    Specimen: Blood   Result Value Ref Range     313 - 618 U/L   Comprehensive Metabolic Panel    Specimen: Blood   Result Value Ref Range    Glucose 94 60 - 100 mg/dL    BUN 11 7 - 21 mg/dL    Creatinine 0.74 0.70 - 1.30 mg/dL    Sodium 137 137 - 145 mmol/L    Potassium 4.5 3.5 - 5.1 mmol/L    Chloride 97 95 - 110 mmol/L    CO2 27.0 22.0 - 31.0 mmol/L    Calcium 9.0 8.4 - 10.2 mg/dL    Total Protein 6.6 6.3 - 8.6 g/dL    Albumin 3.90 3.40 - 4.80 g/dL    ALT (SGPT) 29 21 - 72 U/L    AST (SGOT) 26 17 - 59 U/L    Alkaline Phosphatase 110 38 - 126 U/L    Total Bilirubin 0.4 0.2 - 1.3 mg/dL    eGFR Non  Amer 102 (H) 42 - 98 mL/min/1.73    Globulin 2.7 2.3 - 3.5 gm/dL    A/G Ratio 1.4 1.1 - 1.8 g/dL    BUN/Creatinine Ratio 14.9 7.0 - 25.0    Anion Gap 13.0 5.0 - 15.0 mmol/L   Results for orders placed or performed in visit on 12/08/17   Iron and TIBC    Specimen: Blood   Result Value Ref Range    Iron 82 49 - 181 mcg/dL    TIBC 378 261 - 462 mcg/dL    Iron Saturation 22 20 - 55 %   Folate    Specimen: Blood   Result Value Ref Range    Folate >20.00 2.76 - 21.00 ng/mL   Ferritin    Specimen: Blood   Result Value Ref Range    Ferritin 55.30 17.90 - 464.00 ng/mL   Vitamin B12    Specimen: Blood   Result Value Ref Range    Vitamin B-12 298 239 - 931 pg/mL   Results for orders placed or performed in visit on 12/08/17   CBC Auto Differential    Specimen: Blood   Result Value Ref Range    WBC 5.61 3.20 - 9.80 10*3/mm3    RBC 4.14 (L) 4.37 - 5.74 10*6/mm3    Hemoglobin 12.1 (L) 13.7 - 17.3 g/dL     Hematocrit 34.5 (L) 39.0 - 49.0 %    MCV 83.3 80.0 - 98.0 fL    MCH 29.2 26.5 - 34.0 pg    MCHC 35.1 31.5 - 36.3 g/dL    RDW 12.7 11.5 - 14.5 %    RDW-SD 38.5 35.1 - 43.9 fl    MPV 9.6 8.0 - 12.0 fL    Platelets 172 150 - 450 10*3/mm3    Neutrophil % 66.4 37.0 - 80.0 %    Lymphocyte % 18.5 10.0 - 50.0 %    Monocyte % 9.6 0.0 - 12.0 %    Eosinophil % 4.8 0.0 - 7.0 %    Basophil % 0.5 0.0 - 2.0 %    Immature Grans % 0.2 0.0 - 0.5 %    Neutrophils, Absolute 3.72 2.00 - 8.60 10*3/mm3    Lymphocytes, Absolute 1.04 0.60 - 4.20 10*3/mm3    Monocytes, Absolute 0.54 0.00 - 0.90 10*3/mm3    Eosinophils, Absolute 0.27 0.00 - 0.70 10*3/mm3    Basophils, Absolute 0.03 0.00 - 0.20 10*3/mm3    Immature Grans, Absolute 0.01 0.00 - 0.02 10*3/mm3   Lactate Dehydrogenase    Specimen: Blood   Result Value Ref Range     313 - 618 U/L   Comprehensive Metabolic Panel    Specimen: Blood   Result Value Ref Range    Glucose 87 60 - 100 mg/dL    BUN 11 7 - 21 mg/dL    Creatinine 0.85 0.70 - 1.30 mg/dL    Sodium 137 137 - 145 mmol/L    Potassium 4.3 3.5 - 5.1 mmol/L    Chloride 101 95 - 110 mmol/L    CO2 28.0 22.0 - 31.0 mmol/L    Calcium 9.4 8.4 - 10.2 mg/dL    Total Protein 6.7 6.3 - 8.6 g/dL    Albumin 4.00 3.40 - 4.80 g/dL    ALT (SGPT) 32 21 - 72 U/L    AST (SGOT) 33 17 - 59 U/L    Alkaline Phosphatase 101 38 - 126 U/L    Total Bilirubin 0.5 0.2 - 1.3 mg/dL    eGFR Non African Amer 87 42 - 98 mL/min/1.73    Globulin 2.7 2.3 - 3.5 gm/dL    A/G Ratio 1.5 1.1 - 1.8 g/dL    BUN/Creatinine Ratio 12.9 7.0 - 25.0    Anion Gap 8.0 5.0 - 15.0 mmol/L     *Note: Due to a large number of results and/or encounters for the requested time period, some results have not been displayed. A complete set of results can be found in Results Review.

## 2022-12-06 ENCOUNTER — HOSPITAL ENCOUNTER (OUTPATIENT)
Facility: HOSPITAL | Age: 82
Setting detail: HOSPITAL OUTPATIENT SURGERY
Discharge: HOME | End: 2022-12-06
Attending: INTERNAL MEDICINE | Admitting: INTERNAL MEDICINE

## 2022-12-06 ENCOUNTER — ANESTHESIA (OUTPATIENT)
Dept: GASTROENTEROLOGY | Facility: HOSPITAL | Age: 82
End: 2022-12-06

## 2022-12-06 ENCOUNTER — ANESTHESIA EVENT (OUTPATIENT)
Dept: GASTROENTEROLOGY | Facility: HOSPITAL | Age: 82
End: 2022-12-06

## 2022-12-06 VITALS
RESPIRATION RATE: 18 BRPM | TEMPERATURE: 97.5 F | DIASTOLIC BLOOD PRESSURE: 83 MMHG | OXYGEN SATURATION: 97 % | WEIGHT: 190.48 LBS | SYSTOLIC BLOOD PRESSURE: 184 MMHG | BODY MASS INDEX: 28.21 KG/M2 | HEART RATE: 80 BPM | HEIGHT: 69 IN

## 2022-12-06 DIAGNOSIS — R13.19 OTHER DYSPHAGIA: ICD-10-CM

## 2022-12-06 DIAGNOSIS — K22.4 ESOPHAGEAL MOTILITY DISORDER: ICD-10-CM

## 2022-12-06 DIAGNOSIS — K21.00 GASTROESOPHAGEAL REFLUX DISEASE WITH ESOPHAGITIS WITHOUT HEMORRHAGE: ICD-10-CM

## 2022-12-06 PROCEDURE — 25010000002 PROPOFOL 10 MG/ML EMULSION: Performed by: NURSE ANESTHETIST, CERTIFIED REGISTERED

## 2022-12-06 PROCEDURE — C1769 GUIDE WIRE: HCPCS | Performed by: INTERNAL MEDICINE

## 2022-12-06 PROCEDURE — 43236 UPPR GI SCOPE W/SUBMUC INJ: CPT | Performed by: INTERNAL MEDICINE

## 2022-12-06 PROCEDURE — 43248 EGD GUIDE WIRE INSERTION: CPT | Performed by: INTERNAL MEDICINE

## 2022-12-06 PROCEDURE — 25010000002 ONABOTULINUMTOXINA 100 UNITS RECONSTITUTED SOLUTION: Performed by: INTERNAL MEDICINE

## 2022-12-06 RX ORDER — LIDOCAINE HYDROCHLORIDE 20 MG/ML
INJECTION, SOLUTION INTRAVENOUS AS NEEDED
Status: DISCONTINUED | OUTPATIENT
Start: 2022-12-06 | End: 2022-12-06 | Stop reason: SURG

## 2022-12-06 RX ORDER — LABETALOL HYDROCHLORIDE 5 MG/ML
INJECTION, SOLUTION INTRAVENOUS AS NEEDED
Status: DISCONTINUED | OUTPATIENT
Start: 2022-12-06 | End: 2022-12-06 | Stop reason: SURG

## 2022-12-06 RX ORDER — DEXTROSE AND SODIUM CHLORIDE 5; .45 G/100ML; G/100ML
30 INJECTION, SOLUTION INTRAVENOUS CONTINUOUS PRN
Status: DISCONTINUED | OUTPATIENT
Start: 2022-12-06 | End: 2022-12-06 | Stop reason: HOSPADM

## 2022-12-06 RX ORDER — PROPOFOL 10 MG/ML
VIAL (ML) INTRAVENOUS AS NEEDED
Status: DISCONTINUED | OUTPATIENT
Start: 2022-12-06 | End: 2022-12-06 | Stop reason: SURG

## 2022-12-06 RX ADMIN — PROPOFOL 30 MG: 10 INJECTION, EMULSION INTRAVENOUS at 13:46

## 2022-12-06 RX ADMIN — PROPOFOL 90 MG: 10 INJECTION, EMULSION INTRAVENOUS at 13:41

## 2022-12-06 RX ADMIN — PROPOFOL 10 MG: 10 INJECTION, EMULSION INTRAVENOUS at 13:42

## 2022-12-06 RX ADMIN — LABETALOL HYDROCHLORIDE 5 MG: 5 INJECTION, SOLUTION INTRAVENOUS at 13:51

## 2022-12-06 RX ADMIN — LIDOCAINE HYDROCHLORIDE 80 MG: 20 INJECTION, SOLUTION INTRAVENOUS at 13:41

## 2022-12-06 RX ADMIN — DEXTROSE AND SODIUM CHLORIDE 30 ML/HR: 5; 450 INJECTION, SOLUTION INTRAVENOUS at 13:04

## 2022-12-06 RX ADMIN — PROPOFOL 20 MG: 10 INJECTION, EMULSION INTRAVENOUS at 13:44

## 2022-12-06 NOTE — ANESTHESIA POSTPROCEDURE EVALUATION
Patient: Sharan Steiner    Procedure Summary     Date: 12/06/22 Room / Location: [facility] ENDOSCOPY 1 / [facility] ENDOSCOPY    Anesthesia Start: 1335 Anesthesia Stop: 1357    Procedure: ESOPHAGOGASTRODUODENOSCOPY with botox Diagnosis:       Esophageal motility disorder      Gastroesophageal reflux disease with esophagitis without hemorrhage      Other dysphagia      (Esophageal motility disorder [K22.4])      (Gastroesophageal reflux disease with esophagitis without hemorrhage [K21.00])      (Other dysphagia [R13.19])    Surgeons: Jeff Crabtree MD Provider: Vira Perdue CRNA    Anesthesia Type: general ASA Status: 3          Anesthesia Type: general    Vitals  No vitals data found for the desired time range.          Post Anesthesia Care and Evaluation    Patient location during evaluation: bedside  Patient participation: complete - patient participated  Level of consciousness: sleepy but conscious  Pain score: 0  Pain management: adequate    Airway patency: patent  Anesthetic complications: No anesthetic complications  PONV Status: none  Cardiovascular status: acceptable and hemodynamically stable  Respiratory status: acceptable, spontaneous ventilation and room air  Hydration status: acceptable    Comments: HR 81  /92  SpO2 90%  RR 12

## 2022-12-06 NOTE — ANESTHESIA PREPROCEDURE EVALUATION
Anesthesia Evaluation     Patient summary reviewed and Nursing notes reviewed   no history of anesthetic complications:  NPO Solid Status: > 8 hours  NPO Liquid Status: > 2 hours           Airway   Mallampati: II  TM distance: >3 FB  Neck ROM: full  No difficulty expected  Dental    (+) upper dentures and poor dentition    Pulmonary - normal exam    breath sounds clear to auscultation  (+) COPD, shortness of breath,   Cardiovascular - normal exam    ECG reviewed  Patient on routine beta blocker  Rhythm: regular  Rate: normal    (+) hypertension,     ROS comment: 2017  · Left Ventricle: Left ventricular function is normal. Estimated EF appears to be in the range of greater than 70%  · The left ventricular cavity is mildly dilated.  · Left ventricular wall thickness is consistent with severe concentric hypertrophy.  · Left ventricular diastolic dysfunction is noted (grade I) consistent with impaired relaxation. Normal left atrial pressure  · Right Ventricle: Normal cavity size, wall thickness, systolic function and septal motion noted.  · Mild aortic valve regurgitation is present.  · Mild to moderate tricuspid valve regurgitation is present  · Estimated right ventricular systolic pressure from tricuspid regurgitation is moderately elevated (45-55 mmHg).  · Mild pulmonary hypertension is present.  · There is no evidence of pericardial effusion.    Neuro/Psych  (+) seizures well controlled, dizziness/light headedness,      ROS Comment: Pt reports no seizures in the past 7 years  GI/Hepatic/Renal/Endo    (+)  GERD well controlled,      Musculoskeletal     Abdominal  - normal exam   Substance History - negative use     OB/GYN          Other   arthritis,    history of cancer remission                      Anesthesia Plan    ASA 3     general   total IV anesthesia  intravenous induction     Anesthetic plan, risks, benefits, and alternatives have been provided, discussed and informed consent has been obtained with:  patient.

## 2022-12-14 ENCOUNTER — OFFICE VISIT (OUTPATIENT)
Dept: GASTROENTEROLOGY | Facility: CLINIC | Age: 82
End: 2022-12-14

## 2022-12-14 VITALS
WEIGHT: 194 LBS | DIASTOLIC BLOOD PRESSURE: 68 MMHG | HEIGHT: 69 IN | BODY MASS INDEX: 28.73 KG/M2 | SYSTOLIC BLOOD PRESSURE: 110 MMHG | HEART RATE: 68 BPM

## 2022-12-14 DIAGNOSIS — K21.00 GASTROESOPHAGEAL REFLUX DISEASE WITH ESOPHAGITIS WITHOUT HEMORRHAGE: Primary | ICD-10-CM

## 2022-12-14 DIAGNOSIS — K22.4 ESOPHAGEAL MOTILITY DISORDER: ICD-10-CM

## 2022-12-14 DIAGNOSIS — K22.2 STRICTURE AND STENOSIS OF ESOPHAGUS: ICD-10-CM

## 2022-12-14 PROCEDURE — 99213 OFFICE O/P EST LOW 20 MIN: CPT | Performed by: NURSE PRACTITIONER

## 2022-12-14 NOTE — PROGRESS NOTES
"                                                                                                                  Chief Complaint   Patient presents with   • Difficulty Swallowing       Subjective    Sharan Steiner is a 82 y.o. male. he is here today for follow-up.                                                                  Assessment & Plan                                     1. Gastroesophageal reflux disease with esophagitis without hemorrhage    2. Stricture and stenosis of esophagus    3. Esophageal motility disorder      Plan; continue PPI daily avoid gastric irritants follow standard antireflux measures.  Follow-up in 6 months for recheck return to office sooner if needed    Follow-up: Return in about 6 months (around 6/14/2023).     HPI    82-year-old male presents with his daughter to discuss EGD results.  States since dilation injection of Botox he has been able to eat much better denies any dysphagia since procedure has not had issues with coughing up phlegm material his weight is up 2 pounds from office visit.  He denies any abdominal pain change in bowel movements or blood within his stool.    EGD was completed 12/6/2022 noted benign-appearing stenosis dilated 54 [place] injected with 100 units of Botox.  Gastritis in the stomach duodenum appeared normal no specimens were collected.    Review of Systems  Review of Systems   Constitutional: Negative for activity change, appetite change, chills, diaphoresis, fatigue, fever and unexpected weight change.   HENT: Negative for trouble swallowing (greatly improved ).    Respiratory: Negative for cough and shortness of breath.    Gastrointestinal: Negative for abdominal distention, abdominal pain, anal bleeding, blood in stool, constipation, diarrhea, nausea, rectal pain and vomiting.       /68 (BP Location: Left arm)   Pulse 68   Ht 175.3 cm (69\")   Wt 88 kg (194 lb)   BMI 28.65 kg/m²     Objective      Physical Exam  Constitutional:       " General: He is not in acute distress.     Appearance: Normal appearance. He is normal weight. He is not ill-appearing.   HENT:      Head: Normocephalic and atraumatic.   Pulmonary:      Effort: Pulmonary effort is normal.   Abdominal:      General: Abdomen is flat. Bowel sounds are normal. There is no distension.      Palpations: Abdomen is soft. There is no mass.      Tenderness: There is no abdominal tenderness.   Neurological:      Mental Status: He is alert.               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   • Seizures (HCC)      Past Surgical History:   Procedure Laterality Date   • BACK SURGERY     • COLONOSCOPY  01/13/2017   • ENDOSCOPY N/A 07/17/2019    Procedure: ESOPHAGOGASTRODUODENOSCOPY possible dilation;  Surgeon: Jeff Crabtree MD;  Location: [facility] ENDOSCOPY;  Service: Gastroenterology   • ENDOSCOPY N/A 12/09/2019    Procedure: ESOPHAGOGASTRODUODENOSCOPY possible dilation;  Surgeon: Jeff Crabtree MD;  Location: [facility] ENDOSCOPY;  Service: Gastroenterology   • ENDOSCOPY N/A 02/10/2021    Procedure: ESOPHAGOGASTRODUODENOSCOPY possible dilation Monday appt;  Surgeon: Jeff Crabtree MD;  Location: [facility] ENDOSCOPY;  Service: Gastroenterology;  Laterality: N/A;  savory dilation 48-54 fr   • ENDOSCOPY N/A 05/26/2021    Procedure: ESOPHAGOGASTRODUODENOSCOPY possible dilation;  Surgeon: Jeff Crabtree MD;  Location: [facility] ENDOSCOPY;  Service: Gastroenterology;  Laterality: N/A;   • ENDOSCOPY N/A 09/29/2021    Procedure: ESOPHAGOGASTRODUODENOSCOPY possible dilation;  Surgeon: Jeff Crabtree MD;  Location: [facility] ENDOSCOPY;  Service: Gastroenterology;  Laterality: N/A;   • ENDOSCOPY N/A  2022    Procedure: ESOPHAGOGASTRODUODENOSCOPY possible dilation;  Surgeon: Jeff Crabtree MD;  Location: [facility] ENDOSCOPY;  Service: Gastroenterology;  Laterality: N/A;   • ENDOSCOPY N/A 5/3/2022    Procedure: ESOPHAGOGASTRODUODENOSCOPY;  Surgeon: Jeff Crabtree MD;  Location: [facility] ENDOSCOPY;  Service: Gastroenterology;  Laterality: N/A;   • ORCHIECTOMY     • UPPER GASTROINTESTINAL ENDOSCOPY  02/10/2021   • UPPER GASTROINTESTINAL ENDOSCOPY  2021     Family History   Problem Relation Age of Onset   • Diabetes Mother    • Hypertension Sister    • Cancer Sister    • Hypertension Brother    • Cancer Brother        Allergies   Allergen Reactions   • Levofloxacin Other (See Comments)     Seizures   • Penicillins Anaphylaxis   • Tramadol Other (See Comments)     Seizures   • Crestor [Rosuvastatin Calcium] Other (See Comments)     Rhabdo   • Sulfa Antibiotics Rash     Social History     Socioeconomic History   • Marital status:    Tobacco Use   • Smoking status: Former     Types: Cigarettes     Quit date:      Years since quittin.9   • Smokeless tobacco: Never   • Tobacco comments:     passive   Vaping Use   • Vaping Use: Never used   Substance and Sexual Activity   • Alcohol use: Not Currently     Comment: none in 40 years   • Drug use: Never   • Sexual activity: Defer     Current Medications:  Prior to Admission medications    Medication Sig Start Date End Date Taking? Authorizing Provider   aspirin 81 MG chewable tablet Chew 81 mg Daily.    Monique Melendez MD   atenolol (TENORMIN) 50 MG tablet Take 50 mg by mouth Daily. 16   Monique Melendez MD   Brealona Ellipta 100-25 MCG/INH inhaler 1 puff Daily. 9/15/21   Monique Melendez MD   cetirizine (zyrTEC) 10 MG tablet Take 10 mg by mouth Daily.    Monique Melendez MD   doxazosin (CARDURA) 4 MG tablet Take 4 mg by mouth Daily.    Monique Melendez MD   esomeprazole (nexIUM) 40 MG capsule TAKE 1 CAPSULE BY MOUTH EVERY  DAY 6/20/22   Nury Ortega APRN   fexofenadine (ALLEGRA) 180 MG tablet Take 180 mg by mouth Daily.    ProviderMonique MD   fluticasone (FLONASE) 50 MCG/ACT nasal spray 2 sprays into the nostril(s) as directed by provider 2 (Two) Times a Day. Administer 2 sprays in each nostril for each dose. 3/2/16   Monique Melendez MD   losartan (COZAAR) 100 MG tablet Take 100 mg by mouth Daily. 1/5/21   Monique Melendez MD   valproic acid (DEPAKENE) 250 MG capsule Take 250 mg by mouth. 9/9/21 3/9/22  Monique Melendez MD   VENTOLIN  (90 Base) MCG/ACT inhaler Inhale 2 puffs Every 6 (Six) Hours As Needed. 6/26/19   Monique Melendez MD     Orders placed during this encounter include:  No orders of the defined types were placed in this encounter.    * Surgery not found *  No orders of the defined types were placed in this encounter.        Review and/or summary of lab tests, radiology, procedures, medications. Review and summary of old records and obtaining of history. The risks and benefits of my recommendations, as well as other treatment options were discussed . Any questions/concerned were answered. Patient voiced understanding and agreement.          This document has been electronically signed by OSEI Harepr on December 14, 2022 09:26 CST                                               Results for orders placed or performed in visit on 05/02/22   COVID-19, [facility] IN-HOUSE, NP SWAB IN TRANSPORT MEDIA 8-10 HR TAT - Swab, Nasopharynx    Specimen: Nasopharynx; Swab   Result Value Ref Range    COVID19 Not Detected Not Detected - Ref. Range   Results for orders placed or performed in visit on 04/22/22   COVID-19, [facility] IN-HOUSE, NP SWAB IN TRANSPORT MEDIA 8-10 HR TAT - Swab, Nasopharynx    Specimen: Nasopharynx; Swab   Result Value Ref Range    COVID19 Not Detected Not Detected - Ref. Range   Results for orders placed or performed in visit on 09/27/21   COVID-19, [facility] IN-HOUSE, NP SWAB IN  TRANSPORT MEDIA 8-10 HR TAT - Swab, Nasopharynx    Specimen: Nasopharynx; Swab   Result Value Ref Range    COVID19 Not Detected Not Detected - Ref. Range   Results for orders placed or performed in visit on 05/25/21   COVID-19, [facility] IN-HOUSE, NP SWAB IN TRANSPORT MEDIA 8-10 HR TAT - Swab, Nasopharynx    Specimen: Nasopharynx; Swab   Result Value Ref Range    COVID19 Not Detected Not Detected - Ref. Range   Results for orders placed or performed in visit on 02/07/21   COVID-19,[facility] IN-HOUSE, NP/OP SWAB IN UTM/VTM/SALINE TRANSPORT MEDIA,24 HR TAT - Swab, Nasopharynx    Specimen: Nasopharynx; Swab   Result Value Ref Range    COVID19 Not Detected Not Detected - Ref. Range   Results for orders placed or performed during the hospital encounter of 07/17/19   Tissue Pathology Exam    Specimen: A: Gastric, Antrum; Tissue    B: Esophagus, Distal; Tissue   Result Value Ref Range    Case Report       Surgical Pathology Report                         Case: [id number]                                  Authorizing Provider:  Jeff Crabtree MD        Collected:           07/17/2019 05:30 PM          Ordering Location:     [facility]             Received:            07/18/2019 06:14 AM                                 [facility]                                                     Pathologist:           Domenic Espino MD                                                        Specimens:   1) - Gastric, Antrum, antrum  cold bx                                                               2) - Esophagus, Distal, distal esophagus   cold bx                                         Final Diagnosis       1.  GASTRIC ANTRUM, MUCOSAL BIOPSY:  MILD CHRONIC GASTRITIS.  NO EVIDENCE OF HELICOBACTER PYLORI.    2.  DISTAL ESOPHAGUS, MUCOSAL BIOPSY:  SEGMENTS OF MILDLY INFLAMED STRATIFIED SQUAMOUS EPITHELIUM.      Gross Description       In 2 containers, each of these show mucosal biopsies measuring up to 0.3 cm  in greatest dimension.  Embedded accordingly.  1A antrum; 2A distal esophagus.     Results for orders placed or performed in visit on 03/13/18   Tissue Pathology Exam    Specimen: Clavicle, Right; Hardware / Foreign Body   Result Value Ref Range    Case Report       Surgical Pathology Report                         Case: [id number]                                  Authorizing Provider:  Elroy Lucas MD Collected:           03/13/2018 03:35 PM          Ordering Location:     [facility]     Received:            03/14/2018 07:48 AM                                 [facility]                                                        Pathologist:           Domenic Espino MD                                                         Specimen:    Clavicle, Right, Mediport                                                                  Clinical Information       Removal of Mediport right clavicular area.      Final Diagnosis       OLD MEDIPORT.      Gross Description       The specimen consists of a Mediport with a 25.0 cm rubber lead.      Embedded Images     Results for orders placed or performed in visit on 03/09/18   CBC Auto Differential    Specimen: Blood   Result Value Ref Range    WBC 6.86 3.20 - 9.80 10*3/mm3    RBC 4.28 (L) 4.37 - 5.74 10*6/mm3    Hemoglobin 12.2 (L) 13.7 - 17.3 g/dL    Hematocrit 35.7 (L) 39.0 - 49.0 %    MCV 83.4 80.0 - 98.0 fL    MCH 28.5 26.5 - 34.0 pg    MCHC 34.2 31.5 - 36.3 g/dL    RDW 12.4 11.5 - 14.5 %    RDW-SD 37.6 35.1 - 43.9 fl    MPV 9.8 8.0 - 12.0 fL    Platelets 185 150 - 450 10*3/mm3    Neutrophil % 69.1 37.0 - 80.0 %    Lymphocyte % 10.6 10.0 - 50.0 %    Monocyte % 12.7 (H) 0.0 - 12.0 %    Eosinophil % 6.6 0.0 - 7.0 %    Basophil % 0.6 0.0 - 2.0 %    Immature Grans % 0.4 0.0 - 0.5 %    Neutrophils, Absolute 4.74 2.00 - 8.60 10*3/mm3    Lymphocytes, Absolute 0.73 0.60 - 4.20 10*3/mm3    Monocytes, Absolute 0.87 0.00 - 0.90 10*3/mm3    Eosinophils,  Absolute 0.45 0.00 - 0.70 10*3/mm3    Basophils, Absolute 0.04 0.00 - 0.20 10*3/mm3    Immature Grans, Absolute 0.03 (H) 0.00 - 0.02 10*3/mm3   Lactate Dehydrogenase    Specimen: Blood   Result Value Ref Range     313 - 618 U/L   Comprehensive Metabolic Panel    Specimen: Blood   Result Value Ref Range    Glucose 94 60 - 100 mg/dL    BUN 11 7 - 21 mg/dL    Creatinine 0.74 0.70 - 1.30 mg/dL    Sodium 137 137 - 145 mmol/L    Potassium 4.5 3.5 - 5.1 mmol/L    Chloride 97 95 - 110 mmol/L    CO2 27.0 22.0 - 31.0 mmol/L    Calcium 9.0 8.4 - 10.2 mg/dL    Total Protein 6.6 6.3 - 8.6 g/dL    Albumin 3.90 3.40 - 4.80 g/dL    ALT (SGPT) 29 21 - 72 U/L    AST (SGOT) 26 17 - 59 U/L    Alkaline Phosphatase 110 38 - 126 U/L    Total Bilirubin 0.4 0.2 - 1.3 mg/dL    eGFR Non  Amer 102 (H) 42 - 98 mL/min/1.73    Globulin 2.7 2.3 - 3.5 gm/dL    A/G Ratio 1.4 1.1 - 1.8 g/dL    BUN/Creatinine Ratio 14.9 7.0 - 25.0    Anion Gap 13.0 5.0 - 15.0 mmol/L   Results for orders placed or performed in visit on 12/08/17   Iron and TIBC    Specimen: Blood   Result Value Ref Range    Iron 82 49 - 181 mcg/dL    TIBC 378 261 - 462 mcg/dL    Iron Saturation 22 20 - 55 %   Folate    Specimen: Blood   Result Value Ref Range    Folate >20.00 2.76 - 21.00 ng/mL   Ferritin    Specimen: Blood   Result Value Ref Range    Ferritin 55.30 17.90 - 464.00 ng/mL   Vitamin B12    Specimen: Blood   Result Value Ref Range    Vitamin B-12 298 239 - 931 pg/mL   Results for orders placed or performed in visit on 12/08/17   CBC Auto Differential    Specimen: Blood   Result Value Ref Range    WBC 5.61 3.20 - 9.80 10*3/mm3    RBC 4.14 (L) 4.37 - 5.74 10*6/mm3    Hemoglobin 12.1 (L) 13.7 - 17.3 g/dL    Hematocrit 34.5 (L) 39.0 - 49.0 %    MCV 83.3 80.0 - 98.0 fL    MCH 29.2 26.5 - 34.0 pg    MCHC 35.1 31.5 - 36.3 g/dL    RDW 12.7 11.5 - 14.5 %    RDW-SD 38.5 35.1 - 43.9 fl    MPV 9.6 8.0 - 12.0 fL    Platelets 172 150 - 450 10*3/mm3    Neutrophil % 66.4 37.0  - 80.0 %    Lymphocyte % 18.5 10.0 - 50.0 %    Monocyte % 9.6 0.0 - 12.0 %    Eosinophil % 4.8 0.0 - 7.0 %    Basophil % 0.5 0.0 - 2.0 %    Immature Grans % 0.2 0.0 - 0.5 %    Neutrophils, Absolute 3.72 2.00 - 8.60 10*3/mm3    Lymphocytes, Absolute 1.04 0.60 - 4.20 10*3/mm3    Monocytes, Absolute 0.54 0.00 - 0.90 10*3/mm3    Eosinophils, Absolute 0.27 0.00 - 0.70 10*3/mm3    Basophils, Absolute 0.03 0.00 - 0.20 10*3/mm3    Immature Grans, Absolute 0.01 0.00 - 0.02 10*3/mm3   Lactate Dehydrogenase    Specimen: Blood   Result Value Ref Range     313 - 618 U/L   Comprehensive Metabolic Panel    Specimen: Blood   Result Value Ref Range    Glucose 87 60 - 100 mg/dL    BUN 11 7 - 21 mg/dL    Creatinine 0.85 0.70 - 1.30 mg/dL    Sodium 137 137 - 145 mmol/L    Potassium 4.3 3.5 - 5.1 mmol/L    Chloride 101 95 - 110 mmol/L    CO2 28.0 22.0 - 31.0 mmol/L    Calcium 9.4 8.4 - 10.2 mg/dL    Total Protein 6.7 6.3 - 8.6 g/dL    Albumin 4.00 3.40 - 4.80 g/dL    ALT (SGPT) 32 21 - 72 U/L    AST (SGOT) 33 17 - 59 U/L    Alkaline Phosphatase 101 38 - 126 U/L    Total Bilirubin 0.5 0.2 - 1.3 mg/dL    eGFR Non African Amer 87 42 - 98 mL/min/1.73    Globulin 2.7 2.3 - 3.5 gm/dL    A/G Ratio 1.5 1.1 - 1.8 g/dL    BUN/Creatinine Ratio 12.9 7.0 - 25.0    Anion Gap 8.0 5.0 - 15.0 mmol/L     *Note: Due to a large number of results and/or encounters for the requested time period, some results have not been displayed. A complete set of results can be found in Results Review.

## 2023-07-19 ENCOUNTER — OFFICE VISIT (OUTPATIENT)
Dept: GASTROENTEROLOGY | Facility: CLINIC | Age: 83
End: 2023-07-19
Payer: MEDICARE

## 2023-07-19 VITALS
SYSTOLIC BLOOD PRESSURE: 142 MMHG | DIASTOLIC BLOOD PRESSURE: 82 MMHG | HEIGHT: 69 IN | HEART RATE: 72 BPM | WEIGHT: 185 LBS | BODY MASS INDEX: 27.4 KG/M2

## 2023-07-19 DIAGNOSIS — R13.19 ESOPHAGEAL DYSPHAGIA: ICD-10-CM

## 2023-07-19 DIAGNOSIS — K22.4 ESOPHAGEAL MOTILITY DISORDER: ICD-10-CM

## 2023-07-19 DIAGNOSIS — K21.00 GASTROESOPHAGEAL REFLUX DISEASE WITH ESOPHAGITIS WITHOUT HEMORRHAGE: Primary | ICD-10-CM

## 2023-07-19 PROCEDURE — 99213 OFFICE O/P EST LOW 20 MIN: CPT | Performed by: NURSE PRACTITIONER

## 2023-07-19 PROCEDURE — 3079F DIAST BP 80-89 MM HG: CPT | Performed by: NURSE PRACTITIONER

## 2023-07-19 PROCEDURE — 3077F SYST BP >= 140 MM HG: CPT | Performed by: NURSE PRACTITIONER

## 2023-07-19 RX ORDER — SODIUM CHLORIDE 9 MG/ML
40 INJECTION, SOLUTION INTRAVENOUS AS NEEDED
OUTPATIENT
Start: 2023-07-19

## 2023-07-19 RX ORDER — DEXTROSE AND SODIUM CHLORIDE 5; .45 G/100ML; G/100ML
30 INJECTION, SOLUTION INTRAVENOUS CONTINUOUS PRN
OUTPATIENT
Start: 2023-07-19

## 2023-07-19 NOTE — PROGRESS NOTES
Chief Complaint   Patient presents with    Heartburn    Difficulty Swallowing       Subjective    Sharan Steiner is a 82 y.o. male. he is here today for follow-up.                                                                  Assessment & Plan                                     1. Gastroesophageal reflux disease with esophagitis without hemorrhage    2. Esophageal motility disorder    3. Esophageal dysphagia      Plan; schedule patient for EGD with dilation and Botox.  Continue PPI daily avoid gastric irritants follow standard antireflux measures.    Follow-up: Return in about 6 months (around 1/19/2024) for Recheck.     HPI  82-year-old male presents with his daughter regarding esophageal dysphagia esophageal motility disorder and chronic GERD.  Most recent EGD completed 12/6/2020 2 injection of Botox has greatly improved his ability to eat maintain his weight but over the last month he has had increased dysphagia with intake mucus production and increased coughing up phlegm material throughout the day.  His weight is down 9 pounds since December.  He has been taking PPI daily.  Denies any change in bowel movement or blood within the stool.    Review of Systems  Review of Systems   Constitutional:  Negative for activity change, appetite change, chills, diaphoresis, fatigue, fever and unexpected weight change.   HENT:  Positive for hearing loss ([place]) and trouble swallowing. Negative for sore throat.    Respiratory:  Negative for shortness of breath.    Gastrointestinal:  Positive for abdominal pain (more severe reflux at night over last few weeks). Negative for abdominal distention, anal bleeding, blood in stool, constipation, diarrhea, nausea, rectal pain and vomiting.   Musculoskeletal:  Negative for arthralgias.   Skin:  Negative for pallor.   Neurological:  Negative for light-headedness.  "    /82 (BP Location: Left arm)   Pulse 72   Ht 175.3 cm (69\")   Wt 83.9 kg (185 lb)   BMI 27.32 kg/m²     Objective      Physical Exam  Constitutional:       General: He is not in acute distress.     Appearance: Normal appearance. He is normal weight. He is not ill-appearing or toxic-appearing.   HENT:      Head: Normocephalic and atraumatic.   Pulmonary:      Effort: Pulmonary effort is normal.   Abdominal:      General: Abdomen is flat. Bowel sounds are normal. There is no distension.      Palpations: Abdomen is soft. There is no mass.      Tenderness: There is no abdominal tenderness.   Neurological:      Mental Status: He is alert.             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    Cortical senile cataract     Eyelid cancer     Left  eyelid Sebacous Cancer     History of transfusion     Hypertension     Lymphoma of testis 11/2016    Seizures      Past Surgical History:   Procedure Laterality Date    BACK SURGERY      COLONOSCOPY  01/13/2017    ENDOSCOPY N/A 07/17/2019    Procedure: ESOPHAGOGASTRODUODENOSCOPY possible dilation;  Surgeon: Jeff Crabtree MD;  Location: [facility] ENDOSCOPY;  Service: Gastroenterology    ENDOSCOPY N/A 12/09/2019    Procedure: ESOPHAGOGASTRODUODENOSCOPY possible dilation;  Surgeon: Jeff Crabtree MD;  Location: [facility] ENDOSCOPY;  Service: Gastroenterology    ENDOSCOPY N/A 02/10/2021    Procedure: ESOPHAGOGASTRODUODENOSCOPY possible dilation Monday appt;  Surgeon: Jeff Crabtree MD;  Location: [facility] ENDOSCOPY;  Service: Gastroenterology;  Laterality: N/A;  savory dilation 48-54 fr    ENDOSCOPY N/A 05/26/2021    Procedure: ESOPHAGOGASTRODUODENOSCOPY possible dilation;  Surgeon: Jeff Crabtree MD;  Location: [facility] ENDOSCOPY;  Service: Gastroenterology;  Laterality: N/A;    ENDOSCOPY N/A 09/29/2021    Procedure: " ESOPHAGOGASTRODUODENOSCOPY possible dilation;  Surgeon: Jeff Crabtree MD;  Location: [facility] ENDOSCOPY;  Service: Gastroenterology;  Laterality: N/A;    ENDOSCOPY N/A 2022    Procedure: ESOPHAGOGASTRODUODENOSCOPY possible dilation;  Surgeon: Jeff Crabtree MD;  Location: [facility] ENDOSCOPY;  Service: Gastroenterology;  Laterality: N/A;    ENDOSCOPY N/A 5/3/2022    Procedure: ESOPHAGOGASTRODUODENOSCOPY;  Surgeon: Jeff Crabtree MD;  Location: [facility] ENDOSCOPY;  Service: Gastroenterology;  Laterality: N/A;    ENDOSCOPY N/A 2022    Procedure: ESOPHAGOGASTRODUODENOSCOPY with botox;  Surgeon: Jeff Crabtree MD;  Location: [facility] ENDOSCOPY;  Service: Gastroenterology;  Laterality: N/A;    ORCHIECTOMY      UPPER GASTROINTESTINAL ENDOSCOPY  02/10/2021    UPPER GASTROINTESTINAL ENDOSCOPY  2021     Family History   Problem Relation Age of Onset    Diabetes Mother     Hypertension Sister     Cancer Sister     Hypertension Brother     Cancer Brother        Allergies   Allergen Reactions    Levofloxacin Other (See Comments)     Seizures    Penicillins Anaphylaxis    Tramadol Other (See Comments)     Seizures    Crestor [Rosuvastatin Calcium] Other (See Comments)     Rhabdo    Sulfa Antibiotics Rash     Social History     Socioeconomic History    Marital status:    Tobacco Use    Smoking status: Former     Types: Cigarettes     Quit date:      Years since quittin.5    Smokeless tobacco: Never    Tobacco comments:     passive   Vaping Use    Vaping Use: Never used   Substance and Sexual Activity    Alcohol use: Not Currently     Comment: none in 40 years    Drug use: Never    Sexual activity: Defer     Current Medications:  Prior to Admission medications    Medication Sig Start Date End Date Taking? Authorizing Provider   aspirin 81 MG chewable tablet Chew 81 mg Daily.    Monique Melendez MD   atenolol (TENORMIN) 50 MG tablet Take 50 mg by mouth Daily. 16   Monique Melendez MD    Breo Ellipta 100-25 MCG/INH inhaler 1 puff Daily. 9/15/21   Monique Melendez MD   cetirizine (zyrTEC) 10 MG tablet Take 10 mg by mouth Daily.    Monique Melendez MD   doxazosin (CARDURA) 4 MG tablet Take 4 mg by mouth Daily.    Monique Melendez MD   esomeprazole (nexIUM) 40 MG capsule TAKE ONE (1) CAPSULE BY MOUTH DAILY 7/12/23   Nury Ortega APRN   fexofenadine (ALLEGRA) 180 MG tablet Take 180 mg by mouth Daily.    Monique Melendez MD   fluticasone (FLONASE) 50 MCG/ACT nasal spray 2 sprays into the nostril(s) as directed by provider 2 (Two) Times a Day. Administer 2 sprays in each nostril for each dose. 3/2/16   Monique Melendez MD   losartan (COZAAR) 100 MG tablet Take 100 mg by mouth Daily. 1/5/21   Monique Melendez MD   valproic acid (DEPAKENE) 250 MG capsule Take 250 mg by mouth. 9/9/21 3/9/22  Monique Melendez MD   VENTOLIN  (90 Base) MCG/ACT inhaler Inhale 2 puffs Every 6 (Six) Hours As Needed. 6/26/19   Monique Melendez MD     Orders placed during this encounter include:  Orders Placed This Encounter   Procedures    Obtain Informed Consent     Standing Status:   Future     Order Specific Question:   Informed Consent Given For     Answer:   ESOPHAGOGASTRODUODENOSCOPY with botox     ESOPHAGOGASTRODUODENOSCOPY with botox (N/A)  No orders of the defined types were placed in this encounter.        Review and/or summary of lab tests, radiology, procedures, medications. Review and summary of old records and obtaining of history. The risks and benefits of my recommendations, as well as other treatment options were discussed . Any questions/concerned were answered. Patient voiced understanding and agreement.          This document has been electronically signed by OSEI Harper on July 19, 2023 10:44 CDT                                               Results for orders placed or performed in visit on 05/02/22   COVID-19, [facility] IN-HOUSE, NP SWAB IN TRANSPORT MEDIA  8-10 HR TAT - Swab, Nasopharynx    Specimen: Nasopharynx; Swab   Result Value Ref Range    COVID19 Not Detected Not Detected - Ref. Range   Results for orders placed or performed in visit on 04/22/22   COVID-19,  [facility] IN-HOUSE, NP SWAB IN TRANSPORT MEDIA 8-10 HR TAT - Swab, Nasopharynx    Specimen: Nasopharynx; Swab   Result Value Ref Range    COVID19 Not Detected Not Detected - Ref. Range   Results for orders placed or performed in visit on 09/27/21   COVID-19,  [facility] IN-HOUSE, NP SWAB IN TRANSPORT MEDIA 8-10 HR TAT - Swab, Nasopharynx    Specimen: Nasopharynx; Swab   Result Value Ref Range    COVID19 Not Detected Not Detected - Ref. Range   Results for orders placed or performed in visit on 05/25/21   COVID-19,  [facility] IN-HOUSE, NP SWAB IN TRANSPORT MEDIA 8-10 HR TAT - Swab, Nasopharynx    Specimen: Nasopharynx; Swab   Result Value Ref Range    COVID19 Not Detected Not Detected - Ref. Range   Results for orders placed or performed in visit on 02/07/21   COVID-19,[facility] IN-HOUSE, NP/OP SWAB IN UTM/VTM/SALINE TRANSPORT MEDIA,24 HR TAT - Swab, Nasopharynx    Specimen: Nasopharynx; Swab   Result Value Ref Range    COVID19 Not Detected Not Detected - Ref. Range   Results for orders placed or performed during the hospital encounter of 07/17/19   Tissue Pathology Exam    Specimen: A: Gastric, Antrum; Tissue    B: Esophagus, Distal; Tissue   Result Value Ref Range    Case Report       Surgical Pathology Report                         Case: [id number]                                  Authorizing Provider:  Jeff Crabtree MD        Collected:           07/17/2019 05:30 PM          Ordering Location:     [facility]             Received:            07/18/2019 06:14 AM                                 [place] [facility]                                                     Pathologist:           Domenic Espino MD                                                        Specimens:   1) - Gastric,  Antrum, antrum  cold bx                                                               2) - Esophagus, Distal, distal esophagus   cold bx                                         Final Diagnosis       1.  GASTRIC ANTRUM, MUCOSAL BIOPSY:  MILD CHRONIC GASTRITIS.  NO EVIDENCE OF HELICOBACTER PYLORI.    2.  DISTAL ESOPHAGUS, MUCOSAL BIOPSY:  SEGMENTS OF MILDLY INFLAMED STRATIFIED SQUAMOUS EPITHELIUM.      Gross Description       In 2 containers, each of these show mucosal biopsies measuring up to 0.3 cm in greatest dimension.  Embedded accordingly.  1A antrum; 2A distal esophagus.     Results for orders placed or performed in visit on 03/13/18   Tissue Pathology Exam    Specimen: Clavicle, Right; Hardware / Foreign Body   Result Value Ref Range    Case Report       Surgical Pathology Report                         Case: [id number]                                  Authorizing Provider:  Elroy Lucas MD Collected:           03/13/2018 03:35 PM          Ordering Location:     [facility]     Received:            03/14/2018 07:48 AM                                 [facility]                                                        Pathologist:           Domenic Espino MD                                                         Specimen:    Clavicle, Right, Mediport                                                                  Clinical Information       Removal of Mediport right clavicular area.      Final Diagnosis       OLD MEDIPORT.      Gross Description       The specimen consists of a Mediport with a 25.0 cm rubber lead.      Embedded Images     Results for orders placed or performed in visit on 03/09/18   CBC Auto Differential    Specimen: Blood   Result Value Ref Range    WBC 6.86 3.20 - 9.80 10*3/mm3    RBC 4.28 (L) 4.37 - 5.74 10*6/mm3    Hemoglobin 12.2 (L) 13.7 - 17.3 g/dL    Hematocrit 35.7 (L) 39.0 - 49.0 %    MCV 83.4 80.0 - 98.0 fL    MCH 28.5 26.5 - 34.0 pg    MCHC 34.2  31.5 - 36.3 g/dL    RDW 12.4 11.5 - 14.5 %    RDW-SD 37.6 35.1 - 43.9 fl    MPV 9.8 8.0 - 12.0 fL    Platelets 185 150 - 450 10*3/mm3    Neutrophil % 69.1 37.0 - 80.0 %    Lymphocyte % 10.6 10.0 - 50.0 %    Monocyte % 12.7 (H) 0.0 - 12.0 %    Eosinophil % 6.6 0.0 - 7.0 %    Basophil % 0.6 0.0 - 2.0 %    Immature Grans % 0.4 0.0 - 0.5 %    Neutrophils, Absolute 4.74 2.00 - 8.60 10*3/mm3    Lymphocytes, Absolute 0.73 0.60 - 4.20 10*3/mm3    Monocytes, Absolute 0.87 0.00 - 0.90 10*3/mm3    Eosinophils, Absolute 0.45 0.00 - 0.70 10*3/mm3    Basophils, Absolute 0.04 0.00 - 0.20 10*3/mm3    Immature Grans, Absolute 0.03 (H) 0.00 - 0.02 10*3/mm3   Lactate Dehydrogenase    Specimen: Blood   Result Value Ref Range     313 - 618 U/L   Comprehensive Metabolic Panel    Specimen: Blood   Result Value Ref Range    Glucose 94 60 - 100 mg/dL    BUN 11 7 - 21 mg/dL    Creatinine 0.74 0.70 - 1.30 mg/dL    Sodium 137 137 - 145 mmol/L    Potassium 4.5 3.5 - 5.1 mmol/L    Chloride 97 95 - 110 mmol/L    CO2 27.0 22.0 - 31.0 mmol/L    Calcium 9.0 8.4 - 10.2 mg/dL    Total Protein 6.6 6.3 - 8.6 g/dL    Albumin 3.90 3.40 - 4.80 g/dL    ALT (SGPT) 29 21 - 72 U/L    AST (SGOT) 26 17 - 59 U/L    Alkaline Phosphatase 110 38 - 126 U/L    Total Bilirubin 0.4 0.2 - 1.3 mg/dL    eGFR Non  Amer 102 (H) 42 - 98 mL/min/1.73    Globulin 2.7 2.3 - 3.5 gm/dL    A/G Ratio 1.4 1.1 - 1.8 g/dL    BUN/Creatinine Ratio 14.9 7.0 - 25.0    Anion Gap 13.0 5.0 - 15.0 mmol/L   Results for orders placed or performed in visit on 12/08/17   Iron and TIBC    Specimen: Blood   Result Value Ref Range    Iron 82 49 - 181 mcg/dL    TIBC 378 261 - 462 mcg/dL    Iron Saturation (TSAT) 22 20 - 55 %   Folate    Specimen: Blood   Result Value Ref Range    Folate >20.00 2.76 - 21.00 ng/mL   Ferritin    Specimen: Blood   Result Value Ref Range    Ferritin 55.30 17.90 - 464.00 ng/mL   Vitamin B12    Specimen: Blood   Result Value Ref Range    Vitamin B-12 298 239 -  931 pg/mL   Results for orders placed or performed in visit on 12/08/17   CBC Auto Differential    Specimen: Blood   Result Value Ref Range    WBC 5.61 3.20 - 9.80 10*3/mm3    RBC 4.14 (L) 4.37 - 5.74 10*6/mm3    Hemoglobin 12.1 (L) 13.7 - 17.3 g/dL    Hematocrit 34.5 (L) 39.0 - 49.0 %    MCV 83.3 80.0 - 98.0 fL    MCH 29.2 26.5 - 34.0 pg    MCHC 35.1 31.5 - 36.3 g/dL    RDW 12.7 11.5 - 14.5 %    RDW-SD 38.5 35.1 - 43.9 fl    MPV 9.6 8.0 - 12.0 fL    Platelets 172 150 - 450 10*3/mm3    Neutrophil % 66.4 37.0 - 80.0 %    Lymphocyte % 18.5 10.0 - 50.0 %    Monocyte % 9.6 0.0 - 12.0 %    Eosinophil % 4.8 0.0 - 7.0 %    Basophil % 0.5 0.0 - 2.0 %    Immature Grans % 0.2 0.0 - 0.5 %    Neutrophils, Absolute 3.72 2.00 - 8.60 10*3/mm3    Lymphocytes, Absolute 1.04 0.60 - 4.20 10*3/mm3    Monocytes, Absolute 0.54 0.00 - 0.90 10*3/mm3    Eosinophils, Absolute 0.27 0.00 - 0.70 10*3/mm3    Basophils, Absolute 0.03 0.00 - 0.20 10*3/mm3    Immature Grans, Absolute 0.01 0.00 - 0.02 10*3/mm3   Lactate Dehydrogenase    Specimen: Blood   Result Value Ref Range     313 - 618 U/L   Comprehensive Metabolic Panel    Specimen: Blood   Result Value Ref Range    Glucose 87 60 - 100 mg/dL    BUN 11 7 - 21 mg/dL    Creatinine 0.85 0.70 - 1.30 mg/dL    Sodium 137 137 - 145 mmol/L    Potassium 4.3 3.5 - 5.1 mmol/L    Chloride 101 95 - 110 mmol/L    CO2 28.0 22.0 - 31.0 mmol/L    Calcium 9.4 8.4 - 10.2 mg/dL    Total Protein 6.7 6.3 - 8.6 g/dL    Albumin 4.00 3.40 - 4.80 g/dL    ALT (SGPT) 32 21 - 72 U/L    AST (SGOT) 33 17 - 59 U/L    Alkaline Phosphatase 101 38 - 126 U/L    Total Bilirubin 0.5 0.2 - 1.3 mg/dL    eGFR Non African Amer 87 42 - 98 mL/min/1.73    Globulin 2.7 2.3 - 3.5 gm/dL    A/G Ratio 1.5 1.1 - 1.8 g/dL    BUN/Creatinine Ratio 12.9 7.0 - 25.0    Anion Gap 8.0 5.0 - 15.0 mmol/L     *Note: Due to a large number of results and/or encounters for the requested time period, some results have not been displayed. A complete set  of results can be found in Results Review.

## 2023-08-03 ENCOUNTER — ANESTHESIA (OUTPATIENT)
Dept: GASTROENTEROLOGY | Facility: HOSPITAL | Age: 83
End: 2023-08-03
Payer: MEDICARE

## 2023-08-03 ENCOUNTER — HOSPITAL ENCOUNTER (OUTPATIENT)
Facility: HOSPITAL | Age: 83
Setting detail: HOSPITAL OUTPATIENT SURGERY
Discharge: HOME | End: 2023-08-03
Attending: INTERNAL MEDICINE | Admitting: INTERNAL MEDICINE
Payer: MEDICARE

## 2023-08-03 ENCOUNTER — ANESTHESIA EVENT (OUTPATIENT)
Dept: GASTROENTEROLOGY | Facility: HOSPITAL | Age: 83
End: 2023-08-03
Payer: MEDICARE

## 2023-08-03 VITALS
DIASTOLIC BLOOD PRESSURE: 93 MMHG | HEIGHT: 69 IN | OXYGEN SATURATION: 95 % | WEIGHT: 185 LBS | SYSTOLIC BLOOD PRESSURE: 190 MMHG | RESPIRATION RATE: 18 BRPM | HEART RATE: 68 BPM | BODY MASS INDEX: 27.4 KG/M2 | TEMPERATURE: 96.9 F

## 2023-08-03 DIAGNOSIS — R13.19 ESOPHAGEAL DYSPHAGIA: ICD-10-CM

## 2023-08-03 DIAGNOSIS — K22.4 ESOPHAGEAL MOTILITY DISORDER: ICD-10-CM

## 2023-08-03 DIAGNOSIS — K21.00 GASTROESOPHAGEAL REFLUX DISEASE WITH ESOPHAGITIS WITHOUT HEMORRHAGE: ICD-10-CM

## 2023-08-03 PROCEDURE — 25010000002 PROPOFOL 10 MG/ML EMULSION

## 2023-08-03 PROCEDURE — 25010000002 ONABOTULINUMTOXINA 100 UNITS RECONSTITUTED SOLUTION: Performed by: INTERNAL MEDICINE

## 2023-08-03 PROCEDURE — C1769 GUIDE WIRE: HCPCS | Performed by: INTERNAL MEDICINE

## 2023-08-03 PROCEDURE — 43236 UPPR GI SCOPE W/SUBMUC INJ: CPT | Performed by: INTERNAL MEDICINE

## 2023-08-03 PROCEDURE — 43248 EGD GUIDE WIRE INSERTION: CPT | Performed by: INTERNAL MEDICINE

## 2023-08-03 RX ORDER — PROPOFOL 10 MG/ML
VIAL (ML) INTRAVENOUS AS NEEDED
Status: DISCONTINUED | OUTPATIENT
Start: 2023-08-03 | End: 2023-08-03 | Stop reason: SURG

## 2023-08-03 RX ORDER — DEXTROSE AND SODIUM CHLORIDE 5; .45 G/100ML; G/100ML
30 INJECTION, SOLUTION INTRAVENOUS CONTINUOUS PRN
Status: DISCONTINUED | OUTPATIENT
Start: 2023-08-03 | End: 2023-08-03 | Stop reason: HOSPADM

## 2023-08-03 RX ORDER — SODIUM CHLORIDE 9 MG/ML
40 INJECTION, SOLUTION INTRAVENOUS AS NEEDED
Status: DISCONTINUED | OUTPATIENT
Start: 2023-08-03 | End: 2023-08-03 | Stop reason: HOSPADM

## 2023-08-03 RX ORDER — LIDOCAINE HYDROCHLORIDE 20 MG/ML
INJECTION, SOLUTION INTRAVENOUS AS NEEDED
Status: DISCONTINUED | OUTPATIENT
Start: 2023-08-03 | End: 2023-08-03 | Stop reason: SURG

## 2023-08-03 RX ADMIN — ONABOTULINUMTOXINA 100 UNITS: 100 INJECTION, POWDER, LYOPHILIZED, FOR SOLUTION INTRADERMAL; INTRAMUSCULAR at 15:43

## 2023-08-03 RX ADMIN — PROPOFOL 70 MG: 10 INJECTION, EMULSION INTRAVENOUS at 15:35

## 2023-08-03 RX ADMIN — DEXTROSE AND SODIUM CHLORIDE 30 ML/HR: 5; 450 INJECTION, SOLUTION INTRAVENOUS at 14:55

## 2023-08-03 RX ADMIN — PROPOFOL 70 MG: 10 INJECTION, EMULSION INTRAVENOUS at 15:34

## 2023-08-03 RX ADMIN — LIDOCAINE HYDROCHLORIDE 80 MG: 20 INJECTION, SOLUTION INTRAVENOUS at 15:34

## 2023-08-03 NOTE — INTERVAL H&P NOTE
H&P reviewed. The patient was examined and there are no changes to the H&P.    Risks and benefits discussed with patient. Patient understands these and would like to proceed with procedure.      
0 = independent

## 2023-08-17 ENCOUNTER — OFFICE VISIT (OUTPATIENT)
Dept: GASTROENTEROLOGY | Facility: CLINIC | Age: 83
End: 2023-08-17
Payer: MEDICARE

## 2023-08-17 VITALS
SYSTOLIC BLOOD PRESSURE: 140 MMHG | HEART RATE: 74 BPM | BODY MASS INDEX: 27.49 KG/M2 | HEIGHT: 69 IN | DIASTOLIC BLOOD PRESSURE: 80 MMHG | WEIGHT: 185.6 LBS

## 2023-08-17 DIAGNOSIS — R13.19 ESOPHAGEAL DYSPHAGIA: ICD-10-CM

## 2023-08-17 DIAGNOSIS — K22.4 ESOPHAGEAL MOTILITY DISORDER: ICD-10-CM

## 2023-08-17 DIAGNOSIS — K22.2 STRICTURE AND STENOSIS OF ESOPHAGUS: Primary | ICD-10-CM

## 2023-08-17 PROCEDURE — 3079F DIAST BP 80-89 MM HG: CPT | Performed by: NURSE PRACTITIONER

## 2023-08-17 PROCEDURE — 99213 OFFICE O/P EST LOW 20 MIN: CPT | Performed by: NURSE PRACTITIONER

## 2023-08-17 PROCEDURE — 3077F SYST BP >= 140 MM HG: CPT | Performed by: NURSE PRACTITIONER

## 2023-08-17 NOTE — PROGRESS NOTES
Chief Complaint   Patient presents with    Heartburn    Difficulty Swallowing    esophageal motility disorder       Subjective    Sharan Steiner is a 83 y.o. male. he is here today for follow-up.                                                                  Assessment & Plan                                     1. Stricture and stenosis of esophagus    2. Esophageal dysphagia    3. Esophageal motility disorder    4. Abnormal esophagram      Late entry 8/28/2023 patient and daughter contacted via telephone with abnormal esophagram results will add EGD with dilation due to abnormal esophagram and worsening dysphagia    Plan; discussed concern for recurrent esophageal stricture will obtain esophagram to further evaluate symptoms have improved some from this weekend so may have been related to viral illness.  We will contact patient with esophagram results when available seek emergency medical attention if symptoms worsen or become severe recheck after test sooner if needed    Follow-up: Return in about 4 weeks (around 9/14/2023) for Recheck, After test.     HPI  83-year-old male presents with his daughter for follow-up after EGD with dilation and Botox.  States last time he got Botox symptoms were greatly improved for 6 months reports this time about 1 week after procedure he began having vomiting and not able to tolerate intake.  States over the last 3 to 4 days he has been able to keep food down last night had sloppy Noé and green beans but did stop all green beans because he felt like he was going to vomit.  Liquids are going down fine denies any nausea or abdominal pain.  EGD was completed 8/3/2023 noted benign-appearing stenosis dilated 54 American area was injected with 10 mL of botulinum toxin.  No specimens were collected.  Esophagram completed 8/25/2023 results listed  "below  IMPRESSION:  Impression:  1.  There is a tight stricture at the gastroesophageal junction with associated  significant dilatation/patulous esophagus. There is pooling of contrast to the  level of the upper esophagus.     2. These findings are nonspecific. This may relate to achalasia. A  postinflammatory stricture is also a consideration. A neoplasm in this location  is also not excluded.     3. Further evaluation with endoscopy is recommended. A CT may also be of  benefit.     4. This examination was placed in the unexpected findings folder.  Review of Systems  Review of Systems   Constitutional:  Negative for activity change, appetite change, chills, diaphoresis, fatigue, fever and unexpected weight change.   HENT:  Positive for hearing loss ([place]) and trouble swallowing. Negative for sore throat.    Respiratory:  Negative for shortness of breath.    Gastrointestinal:  Positive for vomiting (monday). Negative for abdominal distention, abdominal pain, anal bleeding, blood in stool, constipation, diarrhea, nausea and rectal pain.   Musculoskeletal:  Negative for arthralgias.   Skin:  Negative for pallor.   Neurological:  Negative for light-headedness.     /80 (BP Location: Left arm)   Pulse 74   Ht 175.3 cm (69\")   Wt 84.2 kg (185 lb 9.6 oz)   BMI 27.41 kg/mý     Objective      Physical Exam  Constitutional:       General: He is not in acute distress.     Appearance: Normal appearance. He is normal weight. He is not ill-appearing or toxic-appearing.   HENT:      Head: Normocephalic and atraumatic.   Pulmonary:      Effort: Pulmonary effort is normal.   Abdominal:      General: Abdomen is flat. Bowel sounds are normal. There is no distension.      Palpations: Abdomen is soft. There is no mass.      Tenderness: There is no abdominal tenderness.   Neurological:      Mental Status: He is alert.             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    Cortical senile cataract     Eyelid cancer     Left  eyelid Sebacous Cancer     History of transfusion     Hypertension     Lymphoma of testis 11/2016    Seizures      Past Surgical History:   Procedure Laterality Date    BACK SURGERY      COLONOSCOPY  01/13/2017    ENDOSCOPY N/A 07/17/2019    Procedure: ESOPHAGOGASTRODUODENOSCOPY possible dilation;  Surgeon: Jeff Crabtree MD;  Location: [facility] ENDOSCOPY;  Service: Gastroenterology    ENDOSCOPY N/A 12/09/2019    Procedure: ESOPHAGOGASTRODUODENOSCOPY possible dilation;  Surgeon: Jeff Crabtree MD;  Location: [facility] ENDOSCOPY;  Service: Gastroenterology    ENDOSCOPY N/A 02/10/2021    Procedure: ESOPHAGOGASTRODUODENOSCOPY possible dilation Monday appt;  Surgeon: Jeff Crabtree MD;  Location: [facility] ENDOSCOPY;  Service: Gastroenterology;  Laterality: N/A;  savory dilation 48-54 fr    ENDOSCOPY N/A 05/26/2021    Procedure: ESOPHAGOGASTRODUODENOSCOPY possible dilation;  Surgeon: Jeff Crabtree MD;  Location: [facility] ENDOSCOPY;  Service: Gastroenterology;  Laterality: N/A;    ENDOSCOPY N/A 09/29/2021    Procedure: ESOPHAGOGASTRODUODENOSCOPY possible dilation;  Surgeon: Jeff Crabtree MD;  Location: [facility] ENDOSCOPY;  Service: Gastroenterology;  Laterality: N/A;    ENDOSCOPY N/A 4/25/2022    Procedure: ESOPHAGOGASTRODUODENOSCOPY possible dilation;  Surgeon: Jeff Crabtree MD;  Location: [facility] ENDOSCOPY;  Service: Gastroenterology;  Laterality: N/A;    ENDOSCOPY N/A 5/3/2022    Procedure: ESOPHAGOGASTRODUODENOSCOPY;  Surgeon: Jeff Crabtree MD;  Location: [facility] ENDOSCOPY;  Service: Gastroenterology;  Laterality: N/A;    ENDOSCOPY N/A 12/6/2022    Procedure: ESOPHAGOGASTRODUODENOSCOPY with botox;  Surgeon: Jeff Crabtree MD;  Location: [facility] ENDOSCOPY;  Service: Gastroenterology;  Laterality: N/A;    ENDOSCOPY N/A 8/3/2023    Procedure: ESOPHAGOGASTRODUODENOSCOPY with botox;   Surgeon: Jeff Crabtree MD;  Location: [facility] ENDOSCOPY;  Service: Gastroenterology;  Laterality: N/A;    ORCHIECTOMY      UPPER GASTROINTESTINAL ENDOSCOPY  02/10/2021    UPPER GASTROINTESTINAL ENDOSCOPY  2021     Family History   Problem Relation Age of Onset    Diabetes Mother     Hypertension Sister     Cancer Sister     Hypertension Brother     Cancer Brother        Allergies   Allergen Reactions    Levofloxacin Other (See Comments)     Seizures    Penicillins Anaphylaxis    Tramadol Other (See Comments)     Seizures    Crestor [Rosuvastatin Calcium] Other (See Comments)     Rhabdo    Sulfa Antibiotics Rash     Social History     Socioeconomic History    Marital status:    Tobacco Use    Smoking status: Former     Types: Cigarettes     Quit date:      Years since quittin.6    Smokeless tobacco: Never    Tobacco comments:     passive   Vaping Use    Vaping Use: Never used   Substance and Sexual Activity    Alcohol use: Not Currently     Comment: none in 40 years    Drug use: Never    Sexual activity: Defer     Current Medications:  Prior to Admission medications    Medication Sig Start Date End Date Taking? Authorizing Provider   aspirin 81 MG chewable tablet Chew 1 tablet Daily.    Monique Melendez MD   atenolol (TENORMIN) 50 MG tablet Take 1 tablet by mouth Daily. 16   Monique Melendez MD   Brealona Ellipta 100-25 MCG/INH inhaler 1 puff Daily. 9/15/21   Monique Melendez MD   cetirizine (zyrTEC) 10 MG tablet Take 1 tablet by mouth Daily.    Monique Melendez MD   doxazosin (CARDURA) 4 MG tablet Take 1 tablet by mouth Daily.    Monique Melendez MD   esomeprazole (nexIUM) 40 MG capsule TAKE ONE (1) CAPSULE BY MOUTH DAILY 23   Nury Ortega APRN   fexofenadine (ALLEGRA) 180 MG tablet Take 1 tablet by mouth Daily.    Monique Melendez MD   fluticasone (FLONASE) 50 MCG/ACT nasal spray 2 sprays into the nostril(s) as directed by provider 2 (Two) Times a Day.  Administer 2 sprays in each nostril for each dose. 3/2/16   Monique Melendez MD   losartan (COZAAR) 100 MG tablet Take 1 tablet by mouth Daily. 1/5/21   Monique Melendez MD   valproic acid (DEPAKENE) 250 MG capsule Take 1 capsule by mouth 2 (Two) Times a Day. 9/9/21 3/9/22  Monique Melendez MD   VENTOLIN  (90 Base) MCG/ACT inhaler Inhale 2 puffs Every 6 (Six) Hours As Needed. 6/26/19   Monique Melendez MD     Orders placed during this encounter include:  Orders Placed This Encounter   Procedures    FL Esophagram Complete     Standing Status:   Future     Number of Occurrences:   1     Standing Expiration Date:   8/17/2024     Order Specific Question:   Reason for Exam:     Answer:   dysphagia after dilation     Order Specific Question:   Does this patient have a diabetic monitoring/medication delivering device on?     Answer:   No     Order Specific Question:   Release to patient     Answer:   Routine Release [[id number]]    Obtain Informed Consent     Standing Status:   Future     Order Specific Question:   Informed Consent Given For     Answer:   ESOPHAGOGASTRODUODENOSCOPY possible dilation     ESOPHAGOGASTRODUODENOSCOPY possible dilation (N/A)  No orders of the defined types were placed in this encounter.        Review and/or summary of lab tests, radiology, procedures, medications. Review and summary of old records and obtaining of history. The risks and benefits of my recommendations, as well as other treatment options were discussed . Any questions/concerned were answered. Patient voiced understanding and agreement.          This document has been electronically signed by OSEI Harper on August 28, 2023 12:51 CDT                                               Results for orders placed or performed in visit on 05/02/22   COVID-19,  [facility] IN-HOUSE, NP SWAB IN TRANSPORT MEDIA 8-10 HR TAT - Swab, Nasopharynx    Specimen: Nasopharynx; Swab   Result Value Ref Range    COVID19 Not  Detected Not Detected - Ref. Range   Results for orders placed or performed in visit on 04/22/22   COVID-19,  [facility] IN-HOUSE, NP SWAB IN TRANSPORT MEDIA 8-10 HR TAT - Swab, Nasopharynx    Specimen: Nasopharynx; Swab   Result Value Ref Range    COVID19 Not Detected Not Detected - Ref. Range   Results for orders placed or performed in visit on 09/27/21   COVID-19,  [facility] IN-HOUSE, NP SWAB IN TRANSPORT MEDIA 8-10 HR TAT - Swab, Nasopharynx    Specimen: Nasopharynx; Swab   Result Value Ref Range    COVID19 Not Detected Not Detected - Ref. Range   Results for orders placed or performed in visit on 05/25/21   COVID-19,  [facility] IN-HOUSE, NP SWAB IN TRANSPORT MEDIA 8-10 HR TAT - Swab, Nasopharynx    Specimen: Nasopharynx; Swab   Result Value Ref Range    COVID19 Not Detected Not Detected - Ref. Range   Results for orders placed or performed in visit on 02/07/21   COVID-19,[facility] IN-HOUSE, NP/OP SWAB IN UTM/VTM/SALINE TRANSPORT MEDIA,24 HR TAT - Swab, Nasopharynx    Specimen: Nasopharynx; Swab   Result Value Ref Range    COVID19 Not Detected Not Detected - Ref. Range   Results for orders placed or performed during the hospital encounter of 07/17/19   Tissue Pathology Exam    Specimen: A: Gastric, Antrum; Tissue    B: Esophagus, Distal; Tissue   Result Value Ref Range    Case Report       Surgical Pathology Report                         Case: [id number]                                  Authorizing Provider:  Jeff Crabtree MD        Collected:           07/17/2019 05:30 PM          Ordering Location:     [facility]             Received:            07/18/2019 06:14 AM                                 [facility]                                                     Pathologist:           Domenic Espino MD                                                        Specimens:   1) - Gastric, Antrum, antrum  cold bx                                                               2) - Esophagus,  Distal, distal esophagus   cold bx                                         Final Diagnosis       1.  GASTRIC ANTRUM, MUCOSAL BIOPSY:  MILD CHRONIC GASTRITIS.  NO EVIDENCE OF HELICOBACTER PYLORI.    2.  DISTAL ESOPHAGUS, MUCOSAL BIOPSY:  SEGMENTS OF MILDLY INFLAMED STRATIFIED SQUAMOUS EPITHELIUM.      Gross Description       In 2 containers, each of these show mucosal biopsies measuring up to 0.3 cm in greatest dimension.  Embedded accordingly.  1A antrum; 2A distal esophagus.     Results for orders placed or performed in visit on 03/13/18   Tissue Pathology Exam    Specimen: Clavicle, Right; Hardware / Foreign Body   Result Value Ref Range    Case Report       Surgical Pathology Report                         Case: [id number]                                  Authorizing Provider:  Elroy Lucas MD Collected:           03/13/2018 03:35 PM          Ordering Location:     [facility]     Received:            03/14/2018 07:48 AM                                 [facility]                                                        Pathologist:           Domenic Espino MD                                                         Specimen:    Clavicle, Right, Mediport                                                                  Clinical Information       Removal of Mediport right clavicular area.      Final Diagnosis       OLD MEDIPORT.      Gross Description       The specimen consists of a Mediport with a 25.0 cm rubber lead.      Embedded Images     Results for orders placed or performed in visit on 03/09/18   CBC Auto Differential    Specimen: Blood   Result Value Ref Range    WBC 6.86 3.20 - 9.80 10*3/mm3    RBC 4.28 (L) 4.37 - 5.74 10*6/mm3    Hemoglobin 12.2 (L) 13.7 - 17.3 g/dL    Hematocrit 35.7 (L) 39.0 - 49.0 %    MCV 83.4 80.0 - 98.0 fL    MCH 28.5 26.5 - 34.0 pg    MCHC 34.2 31.5 - 36.3 g/dL    RDW 12.4 11.5 - 14.5 %    RDW-SD 37.6 35.1 - 43.9 fl    MPV 9.8 8.0 - 12.0 fL     Platelets 185 150 - 450 10*3/mm3    Neutrophil % 69.1 37.0 - 80.0 %    Lymphocyte % 10.6 10.0 - 50.0 %    Monocyte % 12.7 (H) 0.0 - 12.0 %    Eosinophil % 6.6 0.0 - 7.0 %    Basophil % 0.6 0.0 - 2.0 %    Immature Grans % 0.4 0.0 - 0.5 %    Neutrophils, Absolute 4.74 2.00 - 8.60 10*3/mm3    Lymphocytes, Absolute 0.73 0.60 - 4.20 10*3/mm3    Monocytes, Absolute 0.87 0.00 - 0.90 10*3/mm3    Eosinophils, Absolute 0.45 0.00 - 0.70 10*3/mm3    Basophils, Absolute 0.04 0.00 - 0.20 10*3/mm3    Immature Grans, Absolute 0.03 (H) 0.00 - 0.02 10*3/mm3   Lactate Dehydrogenase    Specimen: Blood   Result Value Ref Range     313 - 618 U/L   Comprehensive Metabolic Panel    Specimen: Blood   Result Value Ref Range    Glucose 94 60 - 100 mg/dL    BUN 11 7 - 21 mg/dL    Creatinine 0.74 0.70 - 1.30 mg/dL    Sodium 137 137 - 145 mmol/L    Potassium 4.5 3.5 - 5.1 mmol/L    Chloride 97 95 - 110 mmol/L    CO2 27.0 22.0 - 31.0 mmol/L    Calcium 9.0 8.4 - 10.2 mg/dL    Total Protein 6.6 6.3 - 8.6 g/dL    Albumin 3.90 3.40 - 4.80 g/dL    ALT (SGPT) 29 21 - 72 U/L    AST (SGOT) 26 17 - 59 U/L    Alkaline Phosphatase 110 38 - 126 U/L    Total Bilirubin 0.4 0.2 - 1.3 mg/dL    eGFR Non  Amer 102 (H) 42 - 98 mL/min/1.73    Globulin 2.7 2.3 - 3.5 gm/dL    A/G Ratio 1.4 1.1 - 1.8 g/dL    BUN/Creatinine Ratio 14.9 7.0 - 25.0    Anion Gap 13.0 5.0 - 15.0 mmol/L   Results for orders placed or performed in visit on 12/08/17   Iron and TIBC    Specimen: Blood   Result Value Ref Range    Iron 82 49 - 181 mcg/dL    TIBC 378 261 - 462 mcg/dL    Iron Saturation (TSAT) 22 20 - 55 %   Folate    Specimen: Blood   Result Value Ref Range    Folate >20.00 2.76 - 21.00 ng/mL   Ferritin    Specimen: Blood   Result Value Ref Range    Ferritin 55.30 17.90 - 464.00 ng/mL   Vitamin B12    Specimen: Blood   Result Value Ref Range    Vitamin B-12 298 239 - 931 pg/mL   Results for orders placed or performed in visit on 12/08/17   CBC Auto Differential     Specimen: Blood   Result Value Ref Range    WBC 5.61 3.20 - 9.80 10*3/mm3    RBC 4.14 (L) 4.37 - 5.74 10*6/mm3    Hemoglobin 12.1 (L) 13.7 - 17.3 g/dL    Hematocrit 34.5 (L) 39.0 - 49.0 %    MCV 83.3 80.0 - 98.0 fL    MCH 29.2 26.5 - 34.0 pg    MCHC 35.1 31.5 - 36.3 g/dL    RDW 12.7 11.5 - 14.5 %    RDW-SD 38.5 35.1 - 43.9 fl    MPV 9.6 8.0 - 12.0 fL    Platelets 172 150 - 450 10*3/mm3    Neutrophil % 66.4 37.0 - 80.0 %    Lymphocyte % 18.5 10.0 - 50.0 %    Monocyte % 9.6 0.0 - 12.0 %    Eosinophil % 4.8 0.0 - 7.0 %    Basophil % 0.5 0.0 - 2.0 %    Immature Grans % 0.2 0.0 - 0.5 %    Neutrophils, Absolute 3.72 2.00 - 8.60 10*3/mm3    Lymphocytes, Absolute 1.04 0.60 - 4.20 10*3/mm3    Monocytes, Absolute 0.54 0.00 - 0.90 10*3/mm3    Eosinophils, Absolute 0.27 0.00 - 0.70 10*3/mm3    Basophils, Absolute 0.03 0.00 - 0.20 10*3/mm3    Immature Grans, Absolute 0.01 0.00 - 0.02 10*3/mm3   Lactate Dehydrogenase    Specimen: Blood   Result Value Ref Range     313 - 618 U/L   Comprehensive Metabolic Panel    Specimen: Blood   Result Value Ref Range    Glucose 87 60 - 100 mg/dL    BUN 11 7 - 21 mg/dL    Creatinine 0.85 0.70 - 1.30 mg/dL    Sodium 137 137 - 145 mmol/L    Potassium 4.3 3.5 - 5.1 mmol/L    Chloride 101 95 - 110 mmol/L    CO2 28.0 22.0 - 31.0 mmol/L    Calcium 9.4 8.4 - 10.2 mg/dL    Total Protein 6.7 6.3 - 8.6 g/dL    Albumin 4.00 3.40 - 4.80 g/dL    ALT (SGPT) 32 21 - 72 U/L    AST (SGOT) 33 17 - 59 U/L    Alkaline Phosphatase 101 38 - 126 U/L    Total Bilirubin 0.5 0.2 - 1.3 mg/dL    eGFR Non African Amer 87 42 - 98 mL/min/1.73    Globulin 2.7 2.3 - 3.5 gm/dL    A/G Ratio 1.5 1.1 - 1.8 g/dL    BUN/Creatinine Ratio 12.9 7.0 - 25.0    Anion Gap 8.0 5.0 - 15.0 mmol/L     *Note: Due to a large number of results and/or encounters for the requested time period, some results have not been displayed. A complete set of results can be found in Results Review.

## 2023-08-17 NOTE — H&P (VIEW-ONLY)
Chief Complaint   Patient presents with    Heartburn    Difficulty Swallowing    esophageal motility disorder       Subjective    Sharan Steiner is a 83 y.o. male. he is here today for follow-up.                                                                  Assessment & Plan                                     1. Stricture and stenosis of esophagus    2. Esophageal dysphagia    3. Esophageal motility disorder    4. Abnormal esophagram      Late entry 8/28/2023 patient and daughter contacted via telephone with abnormal esophagram results will add EGD with dilation due to abnormal esophagram and worsening dysphagia    Plan; discussed concern for recurrent esophageal stricture will obtain esophagram to further evaluate symptoms have improved some from this weekend so may have been related to viral illness.  We will contact patient with esophagram results when available seek emergency medical attention if symptoms worsen or become severe recheck after test sooner if needed    Follow-up: Return in about 4 weeks (around 9/14/2023) for Recheck, After test.     HPI  83-year-old male presents with his daughter for follow-up after EGD with dilation and Botox.  States last time he got Botox symptoms were greatly improved for 6 months reports this time about 1 week after procedure he began having vomiting and not able to tolerate intake.  States over the last 3 to 4 days he has been able to keep food down last night had sloppy Noé and green beans but did stop all green beans because he felt like he was going to vomit.  Liquids are going down fine denies any nausea or abdominal pain.  EGD was completed 8/3/2023 noted benign-appearing stenosis dilated 54 [place] area was injected with 10 mL of botulinum toxin.  No specimens were collected.  Esophagram completed 8/25/2023 results listed  "below  IMPRESSION:  Impression:  1.  There is a tight stricture at the gastroesophageal junction with associated  significant dilatation/patulous esophagus. There is pooling of contrast to the  level of the upper esophagus.     2. These findings are nonspecific. This may relate to achalasia. A  postinflammatory stricture is also a consideration. A neoplasm in this location  is also not excluded.     3. Further evaluation with endoscopy is recommended. A CT may also be of  benefit.     4. This examination was placed in the unexpected findings folder.  Review of Systems  Review of Systems   Constitutional:  Negative for activity change, appetite change, chills, diaphoresis, fatigue, fever and unexpected weight change.   HENT:  Positive for hearing loss ([place]) and trouble swallowing. Negative for sore throat.    Respiratory:  Negative for shortness of breath.    Gastrointestinal:  Positive for vomiting (monday). Negative for abdominal distention, abdominal pain, anal bleeding, blood in stool, constipation, diarrhea, nausea and rectal pain.   Musculoskeletal:  Negative for arthralgias.   Skin:  Negative for pallor.   Neurological:  Negative for light-headedness.     /80 (BP Location: Left arm)   Pulse 74   Ht 175.3 cm (69\")   Wt 84.2 kg (185 lb 9.6 oz)   BMI 27.41 kg/m²     Objective      Physical Exam  Constitutional:       General: He is not in acute distress.     Appearance: Normal appearance. He is normal weight. He is not ill-appearing or toxic-appearing.   HENT:      Head: Normocephalic and atraumatic.   Pulmonary:      Effort: Pulmonary effort is normal.   Abdominal:      General: Abdomen is flat. Bowel sounds are normal. There is no distension.      Palpations: Abdomen is soft. There is no mass.      Tenderness: There is no abdominal tenderness.   Neurological:      Mental Status: He is alert.             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    Cortical senile cataract     Eyelid cancer     Left  eyelid Sebacous Cancer     History of transfusion     Hypertension     Lymphoma of testis 11/2016    Seizures      Past Surgical History:   Procedure Laterality Date    BACK SURGERY      COLONOSCOPY  01/13/2017    ENDOSCOPY N/A 07/17/2019    Procedure: ESOPHAGOGASTRODUODENOSCOPY possible dilation;  Surgeon: Jeff Crabtree MD;  Location: [facility] ENDOSCOPY;  Service: Gastroenterology    ENDOSCOPY N/A 12/09/2019    Procedure: ESOPHAGOGASTRODUODENOSCOPY possible dilation;  Surgeon: Jeff Crabtree MD;  Location: [facility] ENDOSCOPY;  Service: Gastroenterology    ENDOSCOPY N/A 02/10/2021    Procedure: ESOPHAGOGASTRODUODENOSCOPY possible dilation Monday appt;  Surgeon: Jeff Crabtree MD;  Location: [facility] ENDOSCOPY;  Service: Gastroenterology;  Laterality: N/A;  savory dilation 48-54 fr    ENDOSCOPY N/A 05/26/2021    Procedure: ESOPHAGOGASTRODUODENOSCOPY possible dilation;  Surgeon: Jeff Crabtree MD;  Location: [facility] ENDOSCOPY;  Service: Gastroenterology;  Laterality: N/A;    ENDOSCOPY N/A 09/29/2021    Procedure: ESOPHAGOGASTRODUODENOSCOPY possible dilation;  Surgeon: Jeff Crabtree MD;  Location: [facility] ENDOSCOPY;  Service: Gastroenterology;  Laterality: N/A;    ENDOSCOPY N/A 4/25/2022    Procedure: ESOPHAGOGASTRODUODENOSCOPY possible dilation;  Surgeon: Jeff Crabtree MD;  Location: [facility] ENDOSCOPY;  Service: Gastroenterology;  Laterality: N/A;    ENDOSCOPY N/A 5/3/2022    Procedure: ESOPHAGOGASTRODUODENOSCOPY;  Surgeon: Jeff Crabtree MD;  Location: [facility] ENDOSCOPY;  Service: Gastroenterology;  Laterality: N/A;    ENDOSCOPY N/A 12/6/2022    Procedure: ESOPHAGOGASTRODUODENOSCOPY with botox;  Surgeon: Jeff Crabtree MD;  Location: [facility] ENDOSCOPY;  Service: Gastroenterology;  Laterality: N/A;    ENDOSCOPY N/A 8/3/2023    Procedure: ESOPHAGOGASTRODUODENOSCOPY with botox;   Surgeon: Jeff Crabtree MD;  Location: [facility] ENDOSCOPY;  Service: Gastroenterology;  Laterality: N/A;    ORCHIECTOMY      UPPER GASTROINTESTINAL ENDOSCOPY  02/10/2021    UPPER GASTROINTESTINAL ENDOSCOPY  2021     Family History   Problem Relation Age of Onset    Diabetes Mother     Hypertension Sister     Cancer Sister     Hypertension Brother     Cancer Brother        Allergies   Allergen Reactions    Levofloxacin Other (See Comments)     Seizures    Penicillins Anaphylaxis    Tramadol Other (See Comments)     Seizures    Crestor [Rosuvastatin Calcium] Other (See Comments)     Rhabdo    Sulfa Antibiotics Rash     Social History     Socioeconomic History    Marital status:    Tobacco Use    Smoking status: Former     Types: Cigarettes     Quit date:      Years since quittin.6    Smokeless tobacco: Never    Tobacco comments:     passive   Vaping Use    Vaping Use: Never used   Substance and Sexual Activity    Alcohol use: Not Currently     Comment: none in 40 years    Drug use: Never    Sexual activity: Defer     Current Medications:  Prior to Admission medications    Medication Sig Start Date End Date Taking? Authorizing Provider   aspirin 81 MG chewable tablet Chew 1 tablet Daily.    Monique Melendez MD   atenolol (TENORMIN) 50 MG tablet Take 1 tablet by mouth Daily. 16   Monique Melendez MD   Brealona Ellipta 100-25 MCG/INH inhaler 1 puff Daily. 9/15/21   Monique Melendez MD   cetirizine (zyrTEC) 10 MG tablet Take 1 tablet by mouth Daily.    Monique Melendez MD   doxazosin (CARDURA) 4 MG tablet Take 1 tablet by mouth Daily.    Monique Melendez MD   esomeprazole (nexIUM) 40 MG capsule TAKE ONE (1) CAPSULE BY MOUTH DAILY 23   Nury Ortega APRN   fexofenadine (ALLEGRA) 180 MG tablet Take 1 tablet by mouth Daily.    Monique Melendez MD   fluticasone (FLONASE) 50 MCG/ACT nasal spray 2 sprays into the nostril(s) as directed by provider 2 (Two) Times a Day.  Administer 2 sprays in each nostril for each dose. 3/2/16   Monique Melendez MD   losartan (COZAAR) 100 MG tablet Take 1 tablet by mouth Daily. 1/5/21   Monique Melendez MD   valproic acid (DEPAKENE) 250 MG capsule Take 1 capsule by mouth 2 (Two) Times a Day. 9/9/21 3/9/22  Monique Melendez MD   VENTOLIN  (90 Base) MCG/ACT inhaler Inhale 2 puffs Every 6 (Six) Hours As Needed. 6/26/19   Monique Melendez MD     Orders placed during this encounter include:  Orders Placed This Encounter   Procedures    FL Esophagram Complete     Standing Status:   Future     Number of Occurrences:   1     Standing Expiration Date:   8/17/2024     Order Specific Question:   Reason for Exam:     Answer:   dysphagia after dilation     Order Specific Question:   Does this patient have a diabetic monitoring/medication delivering device on?     Answer:   No     Order Specific Question:   Release to patient     Answer:   Routine Release [[id number]]    Obtain Informed Consent     Standing Status:   Future     Order Specific Question:   Informed Consent Given For     Answer:   ESOPHAGOGASTRODUODENOSCOPY possible dilation     ESOPHAGOGASTRODUODENOSCOPY possible dilation (N/A)  No orders of the defined types were placed in this encounter.        Review and/or summary of lab tests, radiology, procedures, medications. Review and summary of old records and obtaining of history. The risks and benefits of my recommendations, as well as other treatment options were discussed . Any questions/concerned were answered. Patient voiced understanding and agreement.          This document has been electronically signed by OSEI Harper on August 28, 2023 12:51 CDT                                               Results for orders placed or performed in visit on 05/02/22   COVID-19,  [facility] IN-HOUSE, NP SWAB IN TRANSPORT MEDIA 8-10 HR TAT - Swab, Nasopharynx    Specimen: Nasopharynx; Swab   Result Value Ref Range    COVID19 Not  Detected Not Detected - Ref. Range   Results for orders placed or performed in visit on 04/22/22   COVID-19,  [facility] IN-HOUSE, NP SWAB IN TRANSPORT MEDIA 8-10 HR TAT - Swab, Nasopharynx    Specimen: Nasopharynx; Swab   Result Value Ref Range    COVID19 Not Detected Not Detected - Ref. Range   Results for orders placed or performed in visit on 09/27/21   COVID-19,  [facility] IN-HOUSE, NP SWAB IN TRANSPORT MEDIA 8-10 HR TAT - Swab, Nasopharynx    Specimen: Nasopharynx; Swab   Result Value Ref Range    COVID19 Not Detected Not Detected - Ref. Range   Results for orders placed or performed in visit on 05/25/21   COVID-19,  [facility] IN-HOUSE, NP SWAB IN TRANSPORT MEDIA 8-10 HR TAT - Swab, Nasopharynx    Specimen: Nasopharynx; Swab   Result Value Ref Range    COVID19 Not Detected Not Detected - Ref. Range   Results for orders placed or performed in visit on 02/07/21   COVID-19,[facility] IN-HOUSE, NP/OP SWAB IN UTM/VTM/SALINE TRANSPORT MEDIA,24 HR TAT - Swab, Nasopharynx    Specimen: Nasopharynx; Swab   Result Value Ref Range    COVID19 Not Detected Not Detected - Ref. Range   Results for orders placed or performed during the hospital encounter of 07/17/19   Tissue Pathology Exam    Specimen: A: Gastric, Antrum; Tissue    B: Esophagus, Distal; Tissue   Result Value Ref Range    Case Report       Surgical Pathology Report                         Case: [id number]                                  Authorizing Provider:  Jeff Crabtree MD        Collected:           07/17/2019 05:30 PM          Ordering Location:     [facility]             Received:            07/18/2019 06:14 AM                                 [facility]                                                     Pathologist:           Domenic Espino MD                                                        Specimens:   1) - Gastric, Antrum, antrum  cold bx                                                               2) - Esophagus,  Distal, distal esophagus   cold bx                                         Final Diagnosis       1.  GASTRIC ANTRUM, MUCOSAL BIOPSY:  MILD CHRONIC GASTRITIS.  NO EVIDENCE OF HELICOBACTER PYLORI.    2.  DISTAL ESOPHAGUS, MUCOSAL BIOPSY:  SEGMENTS OF MILDLY INFLAMED STRATIFIED SQUAMOUS EPITHELIUM.      Gross Description       In 2 containers, each of these show mucosal biopsies measuring up to 0.3 cm in greatest dimension.  Embedded accordingly.  1A antrum; 2A distal esophagus.     Results for orders placed or performed in visit on 03/13/18   Tissue Pathology Exam    Specimen: Clavicle, Right; Hardware / Foreign Body   Result Value Ref Range    Case Report       Surgical Pathology Report                         Case: [id number]                                  Authorizing Provider:  Elroy Lucas MD Collected:           03/13/2018 03:35 PM          Ordering Location:     [facility]     Received:            03/14/2018 07:48 AM                                 [facility]                                                        Pathologist:           Domenic Espino MD                                                         Specimen:    Clavicle, Right, Mediport                                                                  Clinical Information       Removal of Mediport right clavicular area.      Final Diagnosis       OLD MEDIPORT.      Gross Description       The specimen consists of a Mediport with a 25.0 cm rubber lead.      Embedded Images     Results for orders placed or performed in visit on 03/09/18   CBC Auto Differential    Specimen: Blood   Result Value Ref Range    WBC 6.86 3.20 - 9.80 10*3/mm3    RBC 4.28 (L) 4.37 - 5.74 10*6/mm3    Hemoglobin 12.2 (L) 13.7 - 17.3 g/dL    Hematocrit 35.7 (L) 39.0 - 49.0 %    MCV 83.4 80.0 - 98.0 fL    MCH 28.5 26.5 - 34.0 pg    MCHC 34.2 31.5 - 36.3 g/dL    RDW 12.4 11.5 - 14.5 %    RDW-SD 37.6 35.1 - 43.9 fl    MPV 9.8 8.0 - 12.0 fL     Platelets 185 150 - 450 10*3/mm3    Neutrophil % 69.1 37.0 - 80.0 %    Lymphocyte % 10.6 10.0 - 50.0 %    Monocyte % 12.7 (H) 0.0 - 12.0 %    Eosinophil % 6.6 0.0 - 7.0 %    Basophil % 0.6 0.0 - 2.0 %    Immature Grans % 0.4 0.0 - 0.5 %    Neutrophils, Absolute 4.74 2.00 - 8.60 10*3/mm3    Lymphocytes, Absolute 0.73 0.60 - 4.20 10*3/mm3    Monocytes, Absolute 0.87 0.00 - 0.90 10*3/mm3    Eosinophils, Absolute 0.45 0.00 - 0.70 10*3/mm3    Basophils, Absolute 0.04 0.00 - 0.20 10*3/mm3    Immature Grans, Absolute 0.03 (H) 0.00 - 0.02 10*3/mm3   Lactate Dehydrogenase    Specimen: Blood   Result Value Ref Range     313 - 618 U/L   Comprehensive Metabolic Panel    Specimen: Blood   Result Value Ref Range    Glucose 94 60 - 100 mg/dL    BUN 11 7 - 21 mg/dL    Creatinine 0.74 0.70 - 1.30 mg/dL    Sodium 137 137 - 145 mmol/L    Potassium 4.5 3.5 - 5.1 mmol/L    Chloride 97 95 - 110 mmol/L    CO2 27.0 22.0 - 31.0 mmol/L    Calcium 9.0 8.4 - 10.2 mg/dL    Total Protein 6.6 6.3 - 8.6 g/dL    Albumin 3.90 3.40 - 4.80 g/dL    ALT (SGPT) 29 21 - 72 U/L    AST (SGOT) 26 17 - 59 U/L    Alkaline Phosphatase 110 38 - 126 U/L    Total Bilirubin 0.4 0.2 - 1.3 mg/dL    eGFR Non  Amer 102 (H) 42 - 98 mL/min/1.73    Globulin 2.7 2.3 - 3.5 gm/dL    A/G Ratio 1.4 1.1 - 1.8 g/dL    BUN/Creatinine Ratio 14.9 7.0 - 25.0    Anion Gap 13.0 5.0 - 15.0 mmol/L   Results for orders placed or performed in visit on 12/08/17   Iron and TIBC    Specimen: Blood   Result Value Ref Range    Iron 82 49 - 181 mcg/dL    TIBC 378 261 - 462 mcg/dL    Iron Saturation (TSAT) 22 20 - 55 %   Folate    Specimen: Blood   Result Value Ref Range    Folate >20.00 2.76 - 21.00 ng/mL   Ferritin    Specimen: Blood   Result Value Ref Range    Ferritin 55.30 17.90 - 464.00 ng/mL   Vitamin B12    Specimen: Blood   Result Value Ref Range    Vitamin B-12 298 239 - 931 pg/mL   Results for orders placed or performed in visit on 12/08/17   CBC Auto Differential     Specimen: Blood   Result Value Ref Range    WBC 5.61 3.20 - 9.80 10*3/mm3    RBC 4.14 (L) 4.37 - 5.74 10*6/mm3    Hemoglobin 12.1 (L) 13.7 - 17.3 g/dL    Hematocrit 34.5 (L) 39.0 - 49.0 %    MCV 83.3 80.0 - 98.0 fL    MCH 29.2 26.5 - 34.0 pg    MCHC 35.1 31.5 - 36.3 g/dL    RDW 12.7 11.5 - 14.5 %    RDW-SD 38.5 35.1 - 43.9 fl    MPV 9.6 8.0 - 12.0 fL    Platelets 172 150 - 450 10*3/mm3    Neutrophil % 66.4 37.0 - 80.0 %    Lymphocyte % 18.5 10.0 - 50.0 %    Monocyte % 9.6 0.0 - 12.0 %    Eosinophil % 4.8 0.0 - 7.0 %    Basophil % 0.5 0.0 - 2.0 %    Immature Grans % 0.2 0.0 - 0.5 %    Neutrophils, Absolute 3.72 2.00 - 8.60 10*3/mm3    Lymphocytes, Absolute 1.04 0.60 - 4.20 10*3/mm3    Monocytes, Absolute 0.54 0.00 - 0.90 10*3/mm3    Eosinophils, Absolute 0.27 0.00 - 0.70 10*3/mm3    Basophils, Absolute 0.03 0.00 - 0.20 10*3/mm3    Immature Grans, Absolute 0.01 0.00 - 0.02 10*3/mm3   Lactate Dehydrogenase    Specimen: Blood   Result Value Ref Range     313 - 618 U/L   Comprehensive Metabolic Panel    Specimen: Blood   Result Value Ref Range    Glucose 87 60 - 100 mg/dL    BUN 11 7 - 21 mg/dL    Creatinine 0.85 0.70 - 1.30 mg/dL    Sodium 137 137 - 145 mmol/L    Potassium 4.3 3.5 - 5.1 mmol/L    Chloride 101 95 - 110 mmol/L    CO2 28.0 22.0 - 31.0 mmol/L    Calcium 9.4 8.4 - 10.2 mg/dL    Total Protein 6.7 6.3 - 8.6 g/dL    Albumin 4.00 3.40 - 4.80 g/dL    ALT (SGPT) 32 21 - 72 U/L    AST (SGOT) 33 17 - 59 U/L    Alkaline Phosphatase 101 38 - 126 U/L    Total Bilirubin 0.5 0.2 - 1.3 mg/dL    eGFR Non African Amer 87 42 - 98 mL/min/1.73    Globulin 2.7 2.3 - 3.5 gm/dL    A/G Ratio 1.5 1.1 - 1.8 g/dL    BUN/Creatinine Ratio 12.9 7.0 - 25.0    Anion Gap 8.0 5.0 - 15.0 mmol/L     *Note: Due to a large number of results and/or encounters for the requested time period, some results have not been displayed. A complete set of results can be found in Results Review.

## 2023-08-25 ENCOUNTER — HOSPITAL ENCOUNTER (OUTPATIENT)
Dept: GENERAL RADIOLOGY | Facility: HOSPITAL | Age: 83
Discharge: HOME | End: 2023-08-25
Admitting: NURSE PRACTITIONER
Payer: MEDICARE

## 2023-08-25 DIAGNOSIS — K22.4 ESOPHAGEAL MOTILITY DISORDER: ICD-10-CM

## 2023-08-25 DIAGNOSIS — R13.19 ESOPHAGEAL DYSPHAGIA: ICD-10-CM

## 2023-08-25 DIAGNOSIS — K22.2 STRICTURE AND STENOSIS OF ESOPHAGUS: ICD-10-CM

## 2023-08-25 PROCEDURE — 63710000001 BARIUM SULFATE 96 % RECONSTITUTED SUSPENSION: Performed by: NURSE PRACTITIONER

## 2023-08-25 PROCEDURE — 74220 X-RAY XM ESOPHAGUS 1CNTRST: CPT

## 2023-08-25 PROCEDURE — A9270 NON-COVERED ITEM OR SERVICE: HCPCS | Performed by: NURSE PRACTITIONER

## 2023-08-25 RX ADMIN — BARIUM SULFATE 40 ML: 960 POWDER, FOR SUSPENSION ORAL at 08:58

## 2023-08-28 PROBLEM — R93.3 ABNORMAL ESOPHAGRAM: Status: ACTIVE | Noted: 2023-08-28

## 2023-08-28 PROBLEM — K22.2 STRICTURE AND STENOSIS OF ESOPHAGUS: Status: ACTIVE | Noted: 2023-01-01

## 2023-08-30 ENCOUNTER — ANESTHESIA EVENT (OUTPATIENT)
Dept: GASTROENTEROLOGY | Facility: HOSPITAL | Age: 83
End: 2023-08-30
Payer: MEDICARE

## 2023-08-30 ENCOUNTER — ANESTHESIA (OUTPATIENT)
Dept: GASTROENTEROLOGY | Facility: HOSPITAL | Age: 83
End: 2023-08-30
Payer: MEDICARE

## 2023-08-30 ENCOUNTER — HOSPITAL ENCOUNTER (OUTPATIENT)
Facility: HOSPITAL | Age: 83
Setting detail: HOSPITAL OUTPATIENT SURGERY
Discharge: HOME | End: 2023-08-30
Attending: INTERNAL MEDICINE | Admitting: INTERNAL MEDICINE
Payer: MEDICARE

## 2023-08-30 VITALS
SYSTOLIC BLOOD PRESSURE: 107 MMHG | OXYGEN SATURATION: 93 % | HEIGHT: 69 IN | BODY MASS INDEX: 27.25 KG/M2 | DIASTOLIC BLOOD PRESSURE: 95 MMHG | RESPIRATION RATE: 16 BRPM | WEIGHT: 184 LBS | TEMPERATURE: 97.5 F | HEART RATE: 90 BPM

## 2023-08-30 DIAGNOSIS — R13.19 ESOPHAGEAL DYSPHAGIA: ICD-10-CM

## 2023-08-30 DIAGNOSIS — K22.4 ESOPHAGEAL MOTILITY DISORDER: ICD-10-CM

## 2023-08-30 DIAGNOSIS — R93.3 ABNORMAL ESOPHAGRAM: ICD-10-CM

## 2023-08-30 DIAGNOSIS — K22.2 STRICTURE AND STENOSIS OF ESOPHAGUS: ICD-10-CM

## 2023-08-30 PROCEDURE — 43236 UPPR GI SCOPE W/SUBMUC INJ: CPT | Performed by: INTERNAL MEDICINE

## 2023-08-30 PROCEDURE — 43248 EGD GUIDE WIRE INSERTION: CPT | Performed by: INTERNAL MEDICINE

## 2023-08-30 PROCEDURE — 25010000002 ONABOTULINUMTOXINA 100 UNITS RECONSTITUTED SOLUTION: Performed by: INTERNAL MEDICINE

## 2023-08-30 PROCEDURE — 25010000002 PROPOFOL 10 MG/ML EMULSION: Performed by: NURSE ANESTHETIST, CERTIFIED REGISTERED

## 2023-08-30 PROCEDURE — C1769 GUIDE WIRE: HCPCS | Performed by: INTERNAL MEDICINE

## 2023-08-30 RX ORDER — LIDOCAINE HYDROCHLORIDE 20 MG/ML
INJECTION, SOLUTION EPIDURAL; INFILTRATION; INTRACAUDAL; PERINEURAL AS NEEDED
Status: DISCONTINUED | OUTPATIENT
Start: 2023-08-30 | End: 2023-08-30 | Stop reason: SURG

## 2023-08-30 RX ORDER — DEXTROSE AND SODIUM CHLORIDE 5; .45 G/100ML; G/100ML
30 INJECTION, SOLUTION INTRAVENOUS CONTINUOUS PRN
Status: DISCONTINUED | OUTPATIENT
Start: 2023-08-30 | End: 2023-08-30 | Stop reason: HOSPADM

## 2023-08-30 RX ORDER — PROPOFOL 10 MG/ML
VIAL (ML) INTRAVENOUS AS NEEDED
Status: DISCONTINUED | OUTPATIENT
Start: 2023-08-30 | End: 2023-08-30 | Stop reason: SURG

## 2023-08-30 RX ADMIN — PROPOFOL 30 MG: 10 INJECTION, EMULSION INTRAVENOUS at 13:24

## 2023-08-30 RX ADMIN — LIDOCAINE HYDROCHLORIDE 100 MG: 20 INJECTION, SOLUTION EPIDURAL; INFILTRATION; INTRACAUDAL; PERINEURAL at 13:22

## 2023-08-30 RX ADMIN — DEXTROSE AND SODIUM CHLORIDE 30 ML/HR: 5; 450 INJECTION, SOLUTION INTRAVENOUS at 12:51

## 2023-08-30 RX ADMIN — PROPOFOL 80 MG: 10 INJECTION, EMULSION INTRAVENOUS at 13:22

## 2023-08-30 RX ADMIN — ONABOTULINUMTOXINA 100 UNITS: 100 INJECTION, POWDER, LYOPHILIZED, FOR SOLUTION INTRADERMAL; INTRAMUSCULAR at 13:30

## 2023-08-30 NOTE — ANESTHESIA PREPROCEDURE EVALUATION
Anesthesia Evaluation                  Airway   Mallampati: III  TM distance: <3 FB  Neck ROM: limited  No difficulty expected and Possible difficult intubation  Dental    (+) upper dentures and poor dentition    Pulmonary - normal exam   (+) COPD,shortness of breath  Cardiovascular - normal exam    (+) hypertension 2 medications or greater      Neuro/Psych  (+) seizures, dizziness/light headedness  GI/Hepatic/Renal/Endo    (+) GERD    Musculoskeletal (-) negative ROS    Abdominal  - normal exam   Substance History - negative use     OB/GYN negative ob/gyn ROS         Other   arthritis,   history of cancer                  Anesthesia Plan    ASA 3     general   total IV anesthesia  intravenous induction     Anesthetic plan, risks, benefits, and alternatives have been provided, discussed and informed consent has been obtained with: patient.    Plan discussed with CRNA.    CODE STATUS:

## 2023-08-30 NOTE — ANESTHESIA POSTPROCEDURE EVALUATION
Patient: Sharan Steiner    Procedure Summary       Date: 08/30/23 Room / Location: [facility] ENDOSCOPY 1 / [facility] ENDOSCOPY    Anesthesia Start: 1321 Anesthesia Stop: 1332    Procedure: ESOPHAGOGASTRODUODENOSCOPY possible dilation Diagnosis:       Stricture and stenosis of esophagus      Esophageal dysphagia      Esophageal motility disorder      Abnormal esophagram      (Stricture and stenosis of esophagus [K22.2])      (Esophageal dysphagia [R13.19])      (Esophageal motility disorder [K22.4])      (Abnormal esophagram [R93.3])    Surgeons: Jeff Crabtree MD Provider: Haley Napier CRNA    Anesthesia Type: general ASA Status: 3            Anesthesia Type: general    Vitals  No vitals data found for the desired time range.          Post Anesthesia Care and Evaluation    Patient participation: waiting for patient participation  Level of consciousness: responsive to painful stimuli  Pain score: 0  Pain management: adequate    Airway patency: patent  Anesthetic complications: No anesthetic complications  PONV Status: none  Cardiovascular status: acceptable  Respiratory status: acceptable and oral airway  Hydration status: acceptable  Post Neuraxial Block status: Motor and sensory function returned to baseline  Comments:  123/76 94% RR14  No anesthesia care post op

## (undated) DEVICE — BITEBLOCK ENDO W/STRAP 60F A/ LF DISP

## (undated) DEVICE — CANN SMPL SOFTECH BIFLO ETCO2 A/M 7FT

## (undated) DEVICE — CLEANGUIDE DISPOSABLE MARKED SPRING TIP GUIDEWIRE, 1.86 MM X 210 CM: Brand: CLEANGUIDE

## (undated) DEVICE — SINGLE USE INJECTOR: Brand: SINGLE USE INJECTOR

## (undated) DEVICE — SINGLE-USE BIOPSY FORCEPS: Brand: RADIAL JAW 4

## (undated) DEVICE — FRCP BIOP RADLJAW4 HOT 2.2X240 BX40